# Patient Record
Sex: MALE | Race: AMERICAN INDIAN OR ALASKA NATIVE | NOT HISPANIC OR LATINO | URBAN - METROPOLITAN AREA
[De-identification: names, ages, dates, MRNs, and addresses within clinical notes are randomized per-mention and may not be internally consistent; named-entity substitution may affect disease eponyms.]

---

## 2017-02-11 ENCOUNTER — OUTPATIENT (OUTPATIENT)
Dept: OUTPATIENT SERVICES | Facility: HOSPITAL | Age: 47
LOS: 1 days | Discharge: HOME | End: 2017-02-11

## 2017-06-27 DIAGNOSIS — M25.519 PAIN IN UNSPECIFIED SHOULDER: ICD-10-CM

## 2018-03-10 ENCOUNTER — EMERGENCY (EMERGENCY)
Facility: HOSPITAL | Age: 48
LOS: 0 days | Discharge: HOME | End: 2018-03-10
Admitting: INTERNAL MEDICINE

## 2018-03-10 VITALS
HEART RATE: 99 BPM | DIASTOLIC BLOOD PRESSURE: 84 MMHG | OXYGEN SATURATION: 96 % | RESPIRATION RATE: 18 BRPM | SYSTOLIC BLOOD PRESSURE: 156 MMHG | TEMPERATURE: 97 F

## 2018-03-10 DIAGNOSIS — Z79.83 LONG TERM (CURRENT) USE OF BISPHOSPHONATES: ICD-10-CM

## 2018-03-10 DIAGNOSIS — M54.6 PAIN IN THORACIC SPINE: ICD-10-CM

## 2018-03-10 DIAGNOSIS — R05 COUGH: ICD-10-CM

## 2018-03-10 DIAGNOSIS — Z79.2 LONG TERM (CURRENT) USE OF ANTIBIOTICS: ICD-10-CM

## 2018-03-10 DIAGNOSIS — E11.9 TYPE 2 DIABETES MELLITUS WITHOUT COMPLICATIONS: ICD-10-CM

## 2018-03-10 DIAGNOSIS — J06.9 ACUTE UPPER RESPIRATORY INFECTION, UNSPECIFIED: ICD-10-CM

## 2018-03-10 RX ORDER — AZITHROMYCIN 500 MG/1
1 TABLET, FILM COATED ORAL
Qty: 6 | Refills: 0 | OUTPATIENT
Start: 2018-03-10 | End: 2018-03-14

## 2018-03-10 RX ORDER — IBUPROFEN 200 MG
600 TABLET ORAL ONCE
Qty: 0 | Refills: 0 | Status: COMPLETED | OUTPATIENT
Start: 2018-03-10 | End: 2018-03-10

## 2018-03-10 RX ORDER — DEXAMETHASONE 0.5 MG/5ML
10 ELIXIR ORAL ONCE
Qty: 0 | Refills: 0 | Status: COMPLETED | OUTPATIENT
Start: 2018-03-10 | End: 2018-03-10

## 2018-03-10 RX ADMIN — Medication 600 MILLIGRAM(S): at 23:15

## 2018-03-10 RX ADMIN — Medication 10 MILLIGRAM(S): at 23:15

## 2018-03-10 NOTE — ED ADULT NURSE NOTE - CHPI ED SYMPTOMS NEG
no change in level of consciousness/no chills/no weakness/no fever/no numbness/no blurred vision/no vomiting/no loss of consciousness/no nausea/no syncope

## 2018-03-10 NOTE — ED PROVIDER NOTE - OBJECTIVE STATEMENT
46 yo male hx of DM present c/o coughing x 1 month and started having right sided ear and throat pain since yesterday. pain to throat relieved with advil. coughing not improved with cough medicine. also reports right side red eye and discharge 2 weeks ago and sxs resolved a day later. coughing also causes left sided upper back pain.   denies fever/chill/HA/dizziness/congestion/chest pain/sob/abd pain/n/v/d/urinary sxs.

## 2018-03-12 ENCOUNTER — EMERGENCY (EMERGENCY)
Facility: HOSPITAL | Age: 48
LOS: 0 days | Discharge: HOME | End: 2018-03-13
Attending: EMERGENCY MEDICINE

## 2018-03-12 VITALS
OXYGEN SATURATION: 98 % | SYSTOLIC BLOOD PRESSURE: 160 MMHG | DIASTOLIC BLOOD PRESSURE: 85 MMHG | TEMPERATURE: 97 F | HEART RATE: 73 BPM | RESPIRATION RATE: 19 BRPM

## 2018-03-12 VITALS
RESPIRATION RATE: 18 BRPM | DIASTOLIC BLOOD PRESSURE: 77 MMHG | TEMPERATURE: 99 F | HEART RATE: 71 BPM | OXYGEN SATURATION: 100 % | SYSTOLIC BLOOD PRESSURE: 147 MMHG

## 2018-03-12 DIAGNOSIS — Z79.899 OTHER LONG TERM (CURRENT) DRUG THERAPY: ICD-10-CM

## 2018-03-12 DIAGNOSIS — E11.9 TYPE 2 DIABETES MELLITUS WITHOUT COMPLICATIONS: ICD-10-CM

## 2018-03-12 DIAGNOSIS — R10.9 UNSPECIFIED ABDOMINAL PAIN: ICD-10-CM

## 2018-03-12 DIAGNOSIS — R11.2 NAUSEA WITH VOMITING, UNSPECIFIED: ICD-10-CM

## 2018-03-12 DIAGNOSIS — Z79.2 LONG TERM (CURRENT) USE OF ANTIBIOTICS: ICD-10-CM

## 2018-03-13 LAB
ALBUMIN SERPL ELPH-MCNC: 3.8 G/DL — SIGNIFICANT CHANGE UP (ref 3–5.5)
ALP SERPL-CCNC: 63 U/L — SIGNIFICANT CHANGE UP (ref 30–115)
ALT FLD-CCNC: 18 U/L — SIGNIFICANT CHANGE UP (ref 0–41)
ANION GAP SERPL CALC-SCNC: 6 MMOL/L — LOW (ref 7–14)
APPEARANCE UR: CLEAR — SIGNIFICANT CHANGE UP
AST SERPL-CCNC: 15 U/L — SIGNIFICANT CHANGE UP (ref 0–41)
BASOPHILS # BLD AUTO: 0.06 K/UL — SIGNIFICANT CHANGE UP (ref 0–0.2)
BASOPHILS NFR BLD AUTO: 0.4 % — SIGNIFICANT CHANGE UP (ref 0–1)
BILIRUB SERPL-MCNC: 0.9 MG/DL — SIGNIFICANT CHANGE UP (ref 0.2–1.2)
BILIRUB UR-MCNC: NEGATIVE — SIGNIFICANT CHANGE UP
BUN SERPL-MCNC: 12 MG/DL — SIGNIFICANT CHANGE UP (ref 10–20)
CALCIUM SERPL-MCNC: 9 MG/DL — SIGNIFICANT CHANGE UP (ref 8.5–10.1)
CHLORIDE SERPL-SCNC: 99 MMOL/L — SIGNIFICANT CHANGE UP (ref 98–110)
CO2 SERPL-SCNC: 30 MMOL/L — SIGNIFICANT CHANGE UP (ref 17–32)
COLOR SPEC: YELLOW — SIGNIFICANT CHANGE UP
CREAT SERPL-MCNC: 1 MG/DL — SIGNIFICANT CHANGE UP (ref 0.7–1.5)
DIFF PNL FLD: NEGATIVE — SIGNIFICANT CHANGE UP
EOSINOPHIL # BLD AUTO: 0.04 K/UL — SIGNIFICANT CHANGE UP (ref 0–0.7)
EOSINOPHIL NFR BLD AUTO: 0.3 % — SIGNIFICANT CHANGE UP (ref 0–8)
EPI CELLS # UR: (no result) /HPF
GLUCOSE SERPL-MCNC: 257 MG/DL — HIGH (ref 70–110)
GLUCOSE UR QL: >=1000
HCT VFR BLD CALC: 45.1 % — SIGNIFICANT CHANGE UP (ref 42–52)
HGB BLD-MCNC: 16.8 G/DL — SIGNIFICANT CHANGE UP (ref 14–18)
IMM GRANULOCYTES NFR BLD AUTO: 0.8 % — HIGH (ref 0.1–0.3)
KETONES UR-MCNC: NEGATIVE — SIGNIFICANT CHANGE UP
LEUKOCYTE ESTERASE UR-ACNC: NEGATIVE — SIGNIFICANT CHANGE UP
LYMPHOCYTES # BLD AUTO: 23.1 % — SIGNIFICANT CHANGE UP (ref 20.5–51.1)
LYMPHOCYTES # BLD AUTO: 3.36 K/UL — SIGNIFICANT CHANGE UP (ref 1.2–3.4)
MCHC RBC-ENTMCNC: 29.5 PG — SIGNIFICANT CHANGE UP (ref 27–31)
MCHC RBC-ENTMCNC: 36.6 G/DL — SIGNIFICANT CHANGE UP (ref 32–37)
MCV RBC AUTO: 80.5 FL — SIGNIFICANT CHANGE UP (ref 80–94)
MONOCYTES # BLD AUTO: 0.79 K/UL — HIGH (ref 0.1–0.6)
MONOCYTES NFR BLD AUTO: 5.4 % — SIGNIFICANT CHANGE UP (ref 1.7–9.3)
NEUTROPHILS # BLD AUTO: 10.2 K/UL — HIGH (ref 1.4–6.5)
NEUTROPHILS NFR BLD AUTO: 70 % — SIGNIFICANT CHANGE UP (ref 42.2–75.2)
NITRITE UR-MCNC: NEGATIVE — SIGNIFICANT CHANGE UP
NRBC # BLD: 0 /100 WBCS — SIGNIFICANT CHANGE UP (ref 0–0)
PH UR: 6.5 — SIGNIFICANT CHANGE UP (ref 5–8)
PLAT MORPH BLD: NORMAL — SIGNIFICANT CHANGE UP
PLATELET # BLD AUTO: 273 K/UL — SIGNIFICANT CHANGE UP (ref 130–400)
POTASSIUM SERPL-MCNC: 4.4 MMOL/L — SIGNIFICANT CHANGE UP (ref 3.5–5)
POTASSIUM SERPL-SCNC: 4.4 MMOL/L — SIGNIFICANT CHANGE UP (ref 3.5–5)
PROT SERPL-MCNC: 6 G/DL — SIGNIFICANT CHANGE UP (ref 6–8)
PROT UR-MCNC: 100
RBC # BLD: 5.6 M/UL — SIGNIFICANT CHANGE UP (ref 4.7–6.1)
RBC # FLD: 13.2 % — SIGNIFICANT CHANGE UP (ref 11.5–14.5)
RBC BLD AUTO: NORMAL — SIGNIFICANT CHANGE UP
RBC CASTS # UR COMP ASSIST: SIGNIFICANT CHANGE UP /HPF
SODIUM SERPL-SCNC: 135 MMOL/L — SIGNIFICANT CHANGE UP (ref 135–146)
SP GR SPEC: >=1.03 — SIGNIFICANT CHANGE UP (ref 1.01–1.03)
UROBILINOGEN FLD QL: 0.2 — SIGNIFICANT CHANGE UP (ref 0.2–0.2)
WBC # BLD: 14.5 K/UL — HIGH (ref 4.8–10.8)
WBC # FLD AUTO: 14.5 K/UL — HIGH (ref 4.8–10.8)
WBC UR QL: SIGNIFICANT CHANGE UP /HPF

## 2018-03-13 RX ORDER — ONDANSETRON 8 MG/1
8 TABLET, FILM COATED ORAL ONCE
Qty: 0 | Refills: 0 | Status: COMPLETED | OUTPATIENT
Start: 2018-03-13 | End: 2018-03-13

## 2018-03-13 RX ORDER — SODIUM CHLORIDE 9 MG/ML
1000 INJECTION INTRAMUSCULAR; INTRAVENOUS; SUBCUTANEOUS ONCE
Qty: 0 | Refills: 0 | Status: COMPLETED | OUTPATIENT
Start: 2018-03-13 | End: 2018-03-13

## 2018-03-13 RX ORDER — KETOROLAC TROMETHAMINE 30 MG/ML
30 SYRINGE (ML) INJECTION ONCE
Qty: 0 | Refills: 0 | Status: DISCONTINUED | OUTPATIENT
Start: 2018-03-13 | End: 2018-03-13

## 2018-03-13 RX ADMIN — Medication 30 MILLIGRAM(S): at 02:40

## 2018-03-13 RX ADMIN — SODIUM CHLORIDE 2000 MILLILITER(S): 9 INJECTION INTRAMUSCULAR; INTRAVENOUS; SUBCUTANEOUS at 02:15

## 2018-03-13 RX ADMIN — ONDANSETRON 8 MILLIGRAM(S): 8 TABLET, FILM COATED ORAL at 02:15

## 2018-03-13 RX ADMIN — Medication 30 MILLIGRAM(S): at 02:15

## 2018-03-13 NOTE — ED PROVIDER NOTE - OBJECTIVE STATEMENT
48 yo M with history of DM II, renal colic, recent URI, her for assessment of L sided back/flank pain associated with single episode of vomiting. No fever, chills, hematuria or dysuria.

## 2018-03-13 NOTE — ED PROVIDER NOTE - PHYSICAL EXAMINATION
VITAL SIGNS: I have reviewed nursing notes and confirm.  CONSTITUTIONAL: Well-developed; well-nourished; in no acute distress.  SKIN: Skin exam is warm and dry, no acute rash.  HEAD: Normocephalic; atraumatic.  EYES: PERRL, EOM intact; conjunctiva and sclera clear.  ENT: No nasal discharge; airway clear.  NECK: Supple; non tender.  CARD: Regular rate and rhythm.  RESP: No wheezes, rales or rhonchi.  ABD: Normal bowel sounds; soft; non-distended; non-tender  EXT: Normal ROM. No clubbing, cyanosis or edema.  BACK: no CVA tenderness  NEURO: Alert, oriented. Grossly unremarkable. No focal deficits.  PSYCH: Cooperative, appropriate.

## 2018-03-13 NOTE — ED ADULT NURSE NOTE - OBJECTIVE STATEMENT
patient admitted to Ed with complaints of left flank pain. Patient reports no n/v.  Patient reports no difficulty urination.  Patient denies chest pain and no SOB noted.

## 2018-03-13 NOTE — ED PROVIDER NOTE - NS ED ROS FT
Constitutional: no fever, chills, no recent weight loss, change in appetite or malaise  Cardiac: No chest pain, SOB or edema.  Respiratory: see HPI, recemt cough, congestion  GI: see HPI  : see HPI  MS: see HPI +flank/back pain, no loss of ROM, no weakness  Neuro: No headache or weakness. No LOC.  Except as documented in the HPI, all other systems are negative.

## 2018-03-13 NOTE — ED PROVIDER NOTE - PROGRESS NOTE DETAILS
CT scan, UA and labs unremarkable -- given recent viral URI, pain may be related to viral illness OR could be 2/2 recent coughing. No signs of PNA, -- will dc with close follow up, continued hydration, tylenol/motrin and return precuations

## 2018-03-13 NOTE — ED PROVIDER NOTE - MEDICAL DECISION MAKING DETAILS
Patient with history of renal colic, recent viral URI, here for assessment of L flank pain, nausea -- no dysuria or hematuria. Will check labs, UA, CT for renal colic, give analgesia, fluids and reassess.     Currently looks well, non toxic, afebrile, no distress. Not suggestive of pyelo or pneumonia.

## 2018-09-14 ENCOUNTER — INPATIENT (INPATIENT)
Facility: HOSPITAL | Age: 48
LOS: 1 days | Discharge: HOME | End: 2018-09-16
Attending: INTERNAL MEDICINE | Admitting: INTERNAL MEDICINE

## 2018-09-14 VITALS
HEART RATE: 80 BPM | DIASTOLIC BLOOD PRESSURE: 91 MMHG | RESPIRATION RATE: 18 BRPM | SYSTOLIC BLOOD PRESSURE: 167 MMHG | OXYGEN SATURATION: 100 %

## 2018-09-14 PROBLEM — E11.9 TYPE 2 DIABETES MELLITUS WITHOUT COMPLICATIONS: Chronic | Status: ACTIVE | Noted: 2018-03-10

## 2018-09-14 LAB
ALBUMIN SERPL ELPH-MCNC: 3.9 G/DL — SIGNIFICANT CHANGE UP (ref 3.5–5.2)
ALBUMIN SERPL ELPH-MCNC: 4.5 G/DL — SIGNIFICANT CHANGE UP (ref 3.5–5.2)
ALP SERPL-CCNC: 70 U/L — SIGNIFICANT CHANGE UP (ref 30–115)
ALP SERPL-CCNC: 86 U/L — SIGNIFICANT CHANGE UP (ref 30–115)
ALT FLD-CCNC: 20 U/L — SIGNIFICANT CHANGE UP (ref 0–41)
ALT FLD-CCNC: 24 U/L — SIGNIFICANT CHANGE UP (ref 0–41)
ANION GAP SERPL CALC-SCNC: 16 MMOL/L — HIGH (ref 7–14)
ANION GAP SERPL CALC-SCNC: 23 MMOL/L — HIGH (ref 7–14)
APTT BLD: 24.4 SEC — LOW (ref 27–39.2)
AST SERPL-CCNC: 18 U/L — SIGNIFICANT CHANGE UP (ref 0–41)
AST SERPL-CCNC: 35 U/L — SIGNIFICANT CHANGE UP (ref 0–41)
BILIRUB SERPL-MCNC: 0.6 MG/DL — SIGNIFICANT CHANGE UP (ref 0.2–1.2)
BILIRUB SERPL-MCNC: 0.7 MG/DL — SIGNIFICANT CHANGE UP (ref 0.2–1.2)
BUN SERPL-MCNC: 10 MG/DL — SIGNIFICANT CHANGE UP (ref 10–20)
BUN SERPL-MCNC: 11 MG/DL — SIGNIFICANT CHANGE UP (ref 10–20)
CALCIUM SERPL-MCNC: 8.9 MG/DL — SIGNIFICANT CHANGE UP (ref 8.5–10.1)
CALCIUM SERPL-MCNC: 9.8 MG/DL — SIGNIFICANT CHANGE UP (ref 8.5–10.1)
CHLORIDE SERPL-SCNC: 96 MMOL/L — LOW (ref 98–110)
CHLORIDE SERPL-SCNC: 98 MMOL/L — SIGNIFICANT CHANGE UP (ref 98–110)
CO2 SERPL-SCNC: 19 MMOL/L — SIGNIFICANT CHANGE UP (ref 17–32)
CO2 SERPL-SCNC: 24 MMOL/L — SIGNIFICANT CHANGE UP (ref 17–32)
CREAT SERPL-MCNC: 0.7 MG/DL — SIGNIFICANT CHANGE UP (ref 0.7–1.5)
CREAT SERPL-MCNC: 1 MG/DL — SIGNIFICANT CHANGE UP (ref 0.7–1.5)
GLUCOSE BLDC GLUCOMTR-MCNC: 187 MG/DL — HIGH (ref 70–99)
GLUCOSE BLDC GLUCOMTR-MCNC: 251 MG/DL — HIGH (ref 70–99)
GLUCOSE BLDC GLUCOMTR-MCNC: 281 MG/DL — HIGH (ref 70–99)
GLUCOSE SERPL-MCNC: 168 MG/DL — HIGH (ref 70–99)
GLUCOSE SERPL-MCNC: 226 MG/DL — HIGH (ref 70–99)
HCT VFR BLD CALC: 42.5 % — SIGNIFICANT CHANGE UP (ref 42–52)
HGB BLD-MCNC: 15.4 G/DL — SIGNIFICANT CHANGE UP (ref 14–18)
INR BLD: 0.99 RATIO — SIGNIFICANT CHANGE UP (ref 0.65–1.3)
MCHC RBC-ENTMCNC: 28.9 PG — SIGNIFICANT CHANGE UP (ref 27–31)
MCHC RBC-ENTMCNC: 36.2 G/DL — SIGNIFICANT CHANGE UP (ref 32–37)
MCV RBC AUTO: 79.9 FL — LOW (ref 80–94)
NRBC # BLD: 0 /100 WBCS — SIGNIFICANT CHANGE UP (ref 0–0)
PLATELET # BLD AUTO: 255 K/UL — SIGNIFICANT CHANGE UP (ref 130–400)
POTASSIUM SERPL-MCNC: 4.3 MMOL/L — SIGNIFICANT CHANGE UP (ref 3.5–5)
POTASSIUM SERPL-MCNC: 4.3 MMOL/L — SIGNIFICANT CHANGE UP (ref 3.5–5)
POTASSIUM SERPL-SCNC: 4.3 MMOL/L — SIGNIFICANT CHANGE UP (ref 3.5–5)
POTASSIUM SERPL-SCNC: 4.3 MMOL/L — SIGNIFICANT CHANGE UP (ref 3.5–5)
PROT SERPL-MCNC: 6.2 G/DL — SIGNIFICANT CHANGE UP (ref 6–8)
PROT SERPL-MCNC: 7.6 G/DL — SIGNIFICANT CHANGE UP (ref 6–8)
PROTHROM AB SERPL-ACNC: 10.7 SEC — SIGNIFICANT CHANGE UP (ref 9.95–12.87)
RBC # BLD: 5.32 M/UL — SIGNIFICANT CHANGE UP (ref 4.7–6.1)
RBC # FLD: 13.2 % — SIGNIFICANT CHANGE UP (ref 11.5–14.5)
SODIUM SERPL-SCNC: 138 MMOL/L — SIGNIFICANT CHANGE UP (ref 135–146)
SODIUM SERPL-SCNC: 138 MMOL/L — SIGNIFICANT CHANGE UP (ref 135–146)
TROPONIN T SERPL-MCNC: 0.13 NG/ML — CRITICAL HIGH
TROPONIN T SERPL-MCNC: <0.01 NG/ML — SIGNIFICANT CHANGE UP
WBC # BLD: 12.65 K/UL — HIGH (ref 4.8–10.8)
WBC # FLD AUTO: 12.65 K/UL — HIGH (ref 4.8–10.8)

## 2018-09-14 RX ORDER — LISINOPRIL 2.5 MG/1
5 TABLET ORAL DAILY
Qty: 0 | Refills: 0 | Status: DISCONTINUED | OUTPATIENT
Start: 2018-09-14 | End: 2018-09-16

## 2018-09-14 RX ORDER — INSULIN GLARGINE 100 [IU]/ML
24 INJECTION, SOLUTION SUBCUTANEOUS AT BEDTIME
Qty: 0 | Refills: 0 | Status: DISCONTINUED | OUTPATIENT
Start: 2018-09-14 | End: 2018-09-16

## 2018-09-14 RX ORDER — TICAGRELOR 90 MG/1
1 TABLET ORAL
Qty: 60 | Refills: 0 | OUTPATIENT
Start: 2018-09-14 | End: 2018-10-13

## 2018-09-14 RX ORDER — INSULIN LISPRO 100/ML
VIAL (ML) SUBCUTANEOUS
Qty: 0 | Refills: 0 | Status: DISCONTINUED | OUTPATIENT
Start: 2018-09-14 | End: 2018-09-16

## 2018-09-14 RX ORDER — DEXTROSE 50 % IN WATER 50 %
25 SYRINGE (ML) INTRAVENOUS ONCE
Qty: 0 | Refills: 0 | Status: DISCONTINUED | OUTPATIENT
Start: 2018-09-14 | End: 2018-09-16

## 2018-09-14 RX ORDER — INSULIN LISPRO 100/ML
7 VIAL (ML) SUBCUTANEOUS
Qty: 0 | Refills: 0 | Status: DISCONTINUED | OUTPATIENT
Start: 2018-09-14 | End: 2018-09-16

## 2018-09-14 RX ORDER — ATORVASTATIN CALCIUM 80 MG/1
40 TABLET, FILM COATED ORAL AT BEDTIME
Qty: 0 | Refills: 0 | Status: DISCONTINUED | OUTPATIENT
Start: 2018-09-14 | End: 2018-09-14

## 2018-09-14 RX ORDER — TICAGRELOR 90 MG/1
90 TABLET ORAL
Qty: 0 | Refills: 0 | Status: DISCONTINUED | OUTPATIENT
Start: 2018-09-14 | End: 2018-09-14

## 2018-09-14 RX ORDER — NITROGLYCERIN 6.5 MG
0.4 CAPSULE, EXTENDED RELEASE ORAL ONCE
Qty: 0 | Refills: 0 | Status: COMPLETED | OUTPATIENT
Start: 2018-09-14 | End: 2018-09-14

## 2018-09-14 RX ORDER — PRASUGREL 5 MG/1
10 TABLET, FILM COATED ORAL DAILY
Qty: 0 | Refills: 0 | Status: DISCONTINUED | OUTPATIENT
Start: 2018-09-15 | End: 2018-09-16

## 2018-09-14 RX ORDER — GLUCAGON INJECTION, SOLUTION 0.5 MG/.1ML
1 INJECTION, SOLUTION SUBCUTANEOUS ONCE
Qty: 0 | Refills: 0 | Status: DISCONTINUED | OUTPATIENT
Start: 2018-09-14 | End: 2018-09-16

## 2018-09-14 RX ORDER — METFORMIN HYDROCHLORIDE 850 MG/1
1 TABLET ORAL
Qty: 0 | Refills: 0 | COMMUNITY

## 2018-09-14 RX ORDER — CHLORHEXIDINE GLUCONATE 213 G/1000ML
1 SOLUTION TOPICAL
Qty: 0 | Refills: 0 | Status: DISCONTINUED | OUTPATIENT
Start: 2018-09-14 | End: 2018-09-16

## 2018-09-14 RX ORDER — DEXTROSE 50 % IN WATER 50 %
12.5 SYRINGE (ML) INTRAVENOUS ONCE
Qty: 0 | Refills: 0 | Status: DISCONTINUED | OUTPATIENT
Start: 2018-09-14 | End: 2018-09-16

## 2018-09-14 RX ORDER — ATORVASTATIN CALCIUM 80 MG/1
80 TABLET, FILM COATED ORAL AT BEDTIME
Qty: 0 | Refills: 0 | Status: DISCONTINUED | OUTPATIENT
Start: 2018-09-14 | End: 2018-09-16

## 2018-09-14 RX ORDER — ASPIRIN/CALCIUM CARB/MAGNESIUM 324 MG
325 TABLET ORAL ONCE
Qty: 0 | Refills: 0 | Status: COMPLETED | OUTPATIENT
Start: 2018-09-14 | End: 2018-09-14

## 2018-09-14 RX ORDER — ASPIRIN/CALCIUM CARB/MAGNESIUM 324 MG
81 TABLET ORAL DAILY
Qty: 0 | Refills: 0 | Status: DISCONTINUED | OUTPATIENT
Start: 2018-09-14 | End: 2018-09-16

## 2018-09-14 RX ORDER — DEXTROSE 50 % IN WATER 50 %
15 SYRINGE (ML) INTRAVENOUS ONCE
Qty: 0 | Refills: 0 | Status: DISCONTINUED | OUTPATIENT
Start: 2018-09-14 | End: 2018-09-16

## 2018-09-14 RX ORDER — PRASUGREL 5 MG/1
30 TABLET, FILM COATED ORAL ONCE
Qty: 0 | Refills: 0 | Status: COMPLETED | OUTPATIENT
Start: 2018-09-14 | End: 2018-09-14

## 2018-09-14 RX ORDER — SODIUM CHLORIDE 9 MG/ML
1000 INJECTION, SOLUTION INTRAVENOUS
Qty: 0 | Refills: 0 | Status: DISCONTINUED | OUTPATIENT
Start: 2018-09-14 | End: 2018-09-16

## 2018-09-14 RX ORDER — PRASUGREL 5 MG/1
30 TABLET, FILM COATED ORAL ONCE
Qty: 0 | Refills: 0 | Status: DISCONTINUED | OUTPATIENT
Start: 2018-09-14 | End: 2018-09-14

## 2018-09-14 RX ORDER — ENOXAPARIN SODIUM 100 MG/ML
40 INJECTION SUBCUTANEOUS DAILY
Qty: 0 | Refills: 0 | Status: DISCONTINUED | OUTPATIENT
Start: 2018-09-14 | End: 2018-09-16

## 2018-09-14 RX ORDER — METOPROLOL TARTRATE 50 MG
25 TABLET ORAL
Qty: 0 | Refills: 0 | Status: DISCONTINUED | OUTPATIENT
Start: 2018-09-14 | End: 2018-09-16

## 2018-09-14 RX ORDER — SODIUM CHLORIDE 9 MG/ML
450 INJECTION INTRAMUSCULAR; INTRAVENOUS; SUBCUTANEOUS
Qty: 0 | Refills: 0 | Status: DISCONTINUED | OUTPATIENT
Start: 2018-09-14 | End: 2018-09-16

## 2018-09-14 RX ADMIN — Medication 325 MILLIGRAM(S): at 07:41

## 2018-09-14 RX ADMIN — Medication 0.4 MILLIGRAM(S): at 07:41

## 2018-09-14 RX ADMIN — Medication 3: at 12:41

## 2018-09-14 RX ADMIN — Medication 25 MILLIGRAM(S): at 17:40

## 2018-09-14 RX ADMIN — INSULIN GLARGINE 24 UNIT(S): 100 INJECTION, SOLUTION SUBCUTANEOUS at 21:43

## 2018-09-14 RX ADMIN — Medication 7 UNIT(S): at 17:15

## 2018-09-14 RX ADMIN — Medication 3: at 17:15

## 2018-09-14 RX ADMIN — Medication 7 UNIT(S): at 12:41

## 2018-09-14 RX ADMIN — PRASUGREL 30 MILLIGRAM(S): 5 TABLET, FILM COATED ORAL at 18:42

## 2018-09-14 RX ADMIN — ENOXAPARIN SODIUM 40 MILLIGRAM(S): 100 INJECTION SUBCUTANEOUS at 18:42

## 2018-09-14 RX ADMIN — ATORVASTATIN CALCIUM 80 MILLIGRAM(S): 80 TABLET, FILM COATED ORAL at 21:43

## 2018-09-14 RX ADMIN — Medication 81 MILLIGRAM(S): at 12:33

## 2018-09-14 RX ADMIN — LISINOPRIL 5 MILLIGRAM(S): 2.5 TABLET ORAL at 12:33

## 2018-09-14 NOTE — ED PEDIATRIC NURSE NOTE - NSIMPLEMENTINTERV_GEN_ALL_ED
Implemented All Universal Safety Interventions:  [place] to call system. Call bell, personal items and telephone within reach. Instruct patient to call for assistance. Room bathroom lighting operational. Non-slip footwear when patient is off stretcher. Physically safe environment: no spills, clutter or unnecessary equipment. Stretcher in lowest position, wheels locked, appropriate side rails in place.

## 2018-09-14 NOTE — ED PROVIDER NOTE - NS ED ROS FT
cardiac +chest pain, no edema  respiratory: +sob  unable to obtain full ROS 2/2 acuity and taken to cath lab

## 2018-09-14 NOTE — H&P ADULT - NSHPPHYSICALEXAM_GEN_ALL_CORE
GENERAL: NAD  HEENT: NC/AT; PERRL, EOMI.  NECK: Supple.  CARD: RRR; S1; S2; no MRG.  RESP: CTA BL with no rales or rhonchi.  ABD: Soft, NT/ND; BS+ in all 4 quadrants.  EXT: UE and LE muscle tone intact with 5/5 strength and no edema.  NEURO: Ox3.  SKIN: Intact.

## 2018-09-14 NOTE — ED PROVIDER NOTE - OBJECTIVE STATEMENT
49yo m pmhx diabetes presents CC crushing chest pain since this morning, about 10 minute after arrival pain resolved. pt states he had similar episode yesterday. associated w sob. no cardiologist or prior cardiac events. ST elevations anterior leads, STEMI code called, cardiology at bedside, taken to cath lab

## 2018-09-14 NOTE — H&P ADULT - NSHPLABSRESULTS_GEN_ALL_CORE
09-14    138  |  96<L>  |  10  ----------------------------<  168<H>  4.3   |  19  |  1.0    Ca    9.8      14 Sep 2018 07:29    TPro  7.6  /  Alb  4.5  /  TBili  0.7  /  DBili  x   /  AST  35  /  ALT  24  /  AlkPhos  86  09-14    PT/INR - ( 14 Sep 2018 07:29 )   PT: 10.70 sec;   INR: 0.99 ratio    PTT - ( 14 Sep 2018 07:29 )  PTT:24.4 sec    Troponin <0.01

## 2018-09-14 NOTE — H&P ADULT - ATTENDING COMMENTS
Notified by Hospitalist office of pts admission last night  Pt belongs to our practice  Chart reviewed  Will transfer to our service  will see pt later today  Thank you

## 2018-09-14 NOTE — ED PROVIDER NOTE - CRITICAL CARE PROVIDED
documentation/consult w/ pt's family directly relating to pts condition/interpretation of diagnostic studies/consultation with other physicians/additional history taking/direct patient care (not related to procedure)

## 2018-09-14 NOTE — H&P ADULT - HISTORY OF PRESENT ILLNESS
The patient is a 48 year old male who presented to [facility] ED with cc/o chest pain x2 days. He has a PMHx significant for T2DM (Metformin XR, Pioglitazone, and Insulin). The patient believed the initial chest pain was due to heart burn, which lasted a few hours and spontaneously resolved. However, the pain returned around 1:00 AM on 09/14/2018, and significantly worsened in intensity at around 6:00 AM, prompting the patient to come to the hospital. EKG showed ST elevations in V2-V4. He was subsequently taken to cath; s/p PCI to LAD and brought to CCU for observation and continued care.

## 2018-09-14 NOTE — CONSULT NOTE ADULT - SUBJECTIVE AND OBJECTIVE BOX
Chief complaint:  Chest Pain    HPI:  49 yo M h/o DM p/w substernal chest pain that started last night decided to come to ER this AM was found to have AWMI on EKG pt was taken to cath lab for emergent PCI    ROS:  Constitutional: No fever, chill, sweats  Eye: No recent visual problem  ENMT: No ear pain, nasal congestion, throat pain  Respiratoty: No SOB, cough  Cardiovascular: + chest pain, palpitaion, syncope  Gastrointestinal: No nausea, vomitting, diarhea  Genitourinary: No dysuria, hematuria  Heam/Lymp: No brusing tendency, no swollen glands  Endocrine: Negative for excessive hunger, thirst  Musculoskeletal: No neck pain, back pain, joint pain  Intergumentory: No rash, skin lesions  Neurologic: alert and oriented    PAST MEDICAL & SURGICAL HISTORY  Diabetes mellitus  No significant past surgical history    FAMILY HISTORY:  FAMILY HISTORY:  No pertinent family history in first degree relatives    SOCIAL HISTORY:  []smoker  []Alcohol  []Drug    ALLERGIES:  Allergy Status Unknown    MEDICATIONS:  MEDICATIONS  (STANDING):  aspirin  chewable 81 milliGRAM(s) Oral daily  atorvastatin 40 milliGRAM(s) Oral at bedtime  lisinopril 5 milliGRAM(s) Oral daily  metoprolol tartrate 25 milliGRAM(s) Oral two times a day  sodium chloride 0.9%. 450 milliLiter(s) (75 mL/Hr) IV Continuous <Continuous>  ticagrelor 90 milliGRAM(s) Oral two times a day    MEDICATIONS  (PRN):      HOME MEDICATIONS:  Home Medications:      VITALS:   T(F): 98 (09-14 @ 07:37), Max: 98 (09-14 @ 07:37)  HR: 138 (09-14 @ 07:37) (80 - 138)  BP: 138/78 (09-14 @ 07:37) (138/78 - 167/91)  BP(mean): --  RR: 20 (09-14 @ 07:37) (16 - 20)  SpO2: 95% (09-14 @ 07:37) (95% - 100%)    I&O's Summary      PHYSICAL EXAM:  GEN: Alert and oriented X 3, Well nourished, No acute distress  NECK: Supple, non tender, NO JVD, No carotid bruit,   LUNGS: Clear to auscultation bilaterally, non labored respiration  CARDIOVASCULAR: S1/S2 present, RRR , no murmus or rubs, + PP bilaterally  ABD: Soft, non-tender, non-distended,   EXT: No Lower extreimity edema, no tenderness  NEURO: Non focal  SKIN: Intact    LABS:    09-14    138  |  96<L>  |  10  ----------------------------<  168<H>  4.3   |  19  |  1.0    Ca    9.8      14 Sep 2018 07:29    TPro  7.6  /  Alb  4.5  /  TBili  0.7  /  DBili  x   /  AST  35  /  ALT  24  /  AlkPhos  86  09-14    PT/INR - ( 14 Sep 2018 07:29 )   PT: 10.70 sec;   INR: 0.99 ratio         PTT - ( 14 Sep 2018 07:29 )  PTT:24.4 sec  Troponin T, Serum: <0.01 ng/mL (09-14-18 @ 07:29)    CARDIAC MARKERS ( 14 Sep 2018 07:29 )  x     / <0.01 ng/mL / x     / x     / x          RADIOLOGY:  -CXR:  -TTE:  -CCTA:  -STRESS TEST:  -CATHETERIZATION:    ECG: ST lety in anterolateral leads Chief complaint:  Chest Pain    HPI:  47 yo M h/o DM p/w substernal chest pain that started last night , pressure like, no radiation, associated with sob and palpitations  decided to come to ER this AM was found to have AWMI on EKG pt was taken to cath lab for emergent PCI  Sanjana, good et, non smoker, no fhx of cad      ROS:  Constitutional: No fever, chill, sweats  Eye: No recent visual problem  ENMT: No ear pain, nasal congestion, throat pain  Respiratory No SOB, cough  Cardiovascular: + chest pain, palpitaion, syncope  Gastrointestinal: No nausea, vomitting, diarhea  Genitourinary: No dysuria, hematuria  Heam/Lymp: No brusing tendency, no swollen glands  Endocrine: Negative for excessive hunger, thirst  Musculoskeletal: No neck pain, back pain, joint pain  Intergumentory: No rash, skin lesions  Neurologic: alert and oriented    PAST MEDICAL & SURGICAL HISTORY  Diabetes mellitus  No significant past surgical history    FAMILY HISTORY:  FAMILY HISTORY:  No pertinent family history of cad in first degree relatives    SOCIAL HISTORY:  [x]non smoker  [x] no Alcohol  []Drug    ALLERGIES:  Allergy Status Unknown    MEDICATIONS:  MEDICATIONS  (STANDING):  aspirin  chewable 81 milliGRAM(s) Oral daily  atorvastatin 40 milliGRAM(s) Oral at bedtime  lisinopril 5 milliGRAM(s) Oral daily  metoprolol tartrate 25 milliGRAM(s) Oral two times a day  sodium chloride 0.9%. 450 milliLiter(s) (75 mL/Hr) IV Continuous <Continuous>  ticagrelor 90 milliGRAM(s) Oral two times a day    MEDICATIONS  (PRN):      HOME MEDICATIONS:  Home Medications:      VITALS:   T(F): 98 (09-14 @ 07:37), Max: 98 (09-14 @ 07:37)  HR: 138 (09-14 @ 07:37) (80 - 138)  BP: 138/78 (09-14 @ 07:37) (138/78 - 167/91)  BP(mean): --  RR: 20 (09-14 @ 07:37) (16 - 20)  SpO2: 95% (09-14 @ 07:37) (95% - 100%)    I&O's Summary      PHYSICAL EXAM:  GEN: Alert and oriented X 3, Well nourished, No acute distress  NECK: Supple, non tender, NO JVD, No carotid bruit,   LUNGS: Clear to auscultation bilaterally, non labored respiration  CARDIOVASCULAR: S1/S2 present, RRR , no murmus or rubs, + PP bilaterally  ABD: Soft, non-tender, non-distended,   MS: No Lower extreimity edema, no tenderness  NEURO: Non focal  SKIN: Intact    LABS:    09-14    138  |  96<L>  |  10  ----------------------------<  168<H>  4.3   |  19  |  1.0    Ca    9.8      14 Sep 2018 07:29    TPro  7.6  /  Alb  4.5  /  TBili  0.7  /  DBili  x   /  AST  35  /  ALT  24  /  AlkPhos  86  09-14    PT/INR - ( 14 Sep 2018 07:29 )   PT: 10.70 sec;   INR: 0.99 ratio         PTT - ( 14 Sep 2018 07:29 )  PTT:24.4 sec  Troponin T, Serum: <0.01 ng/mL (09-14-18 @ 07:29)    CARDIAC MARKERS ( 14 Sep 2018 07:29 )  x     / <0.01 ng/mL / x     / x     / x          RADIOLOGY:  -CXR:  -TTE:  -CCTA:  -STRESS TEST:  -CATHETERIZATION:    ECG: ST lety in anterolateral leads

## 2018-09-14 NOTE — ED PROVIDER NOTE - MEDICAL DECISION MAKING DETAILS
47 y/o M pmh DM, p/w intermittent cp since last night, severe this AM + SOB, EKG w/ precordial TYLER.  Pt looks uncomfortable, CTAB; RRR; pulses 2+ x 4 extrem; no leg swelling.  No prior Similar pain, no prior cath.  STEMI code called.  Pt seen by cardiology, admitted for emergent CATH for STEMI.  Pain improved in ED.  Pt remained hemodynamically stable.

## 2018-09-14 NOTE — CONSULT NOTE ADULT - ASSESSMENT
AWMI s/p mLAD PCI with DAWSON  - DAPT  - admit to CCU  - BB/ACEI  - 2d echo  - Hba1c, lipid profile  - lipitor 80 mg QHS AWMI s/p mLAD PCI with DAWSON  - DAPT  - admit to CCU  - BB/ACEI  - 2d echo  - Hba1c, lipid profile  - lipitor 80 mg QHS  - aggressive risk modif

## 2018-09-14 NOTE — ED PROVIDER NOTE - PHYSICAL EXAMINATION
CONSTITUTIONAL: Well-developed; well-nourished; holding chest  SKIN: warm, dry  HEAD: Normocephalic; atraumatic.  EYES: PERRL, EOMI, no conjunctival erythema  ENT: No nasal discharge; airway clear, mucous membranes moist  NECK: Supple; non tender.  CARD: +S1, S2 no murmurs, gallops, or rubs. Regular rate and rhythm. radial 2+  RESP: No wheezes, rales or rhonchi. CTABL  ABD: soft ntnd  EXT: moves all extremities,  No clubbing, cyanosis or edema.   NEURO: Alert, oriented, no focal deficits  PSYCH: Cooperative, appropriate.

## 2018-09-14 NOTE — ED ADULT NURSE NOTE - NSIMPLEMENTINTERV_GEN_ALL_ED
Implemented All Fall with Harm Risk Interventions:  [place] to call system. Call bell, personal items and telephone within reach. Instruct patient to call for assistance. Room bathroom lighting operational. Non-slip footwear when patient is off stretcher. Physically safe environment: no spills, clutter or unnecessary equipment. Stretcher in lowest position, wheels locked, appropriate side rails in place. Provide visual cue, wrist band, yellow gown, etc. Monitor gait and stability. Monitor for mental status changes and reorient to person, place, and time. Review medications for side effects contributing to fall risk. Reinforce activity limits and safety measures with patient and family. Provide visual clues: red socks.

## 2018-09-14 NOTE — H&P ADULT - FAMILY HISTORY
Mother  Still living? Unknown  Family history of type 2 diabetes mellitus, Age at diagnosis: Age Unknown     Sibling  Still living? Unknown  Family history of type 2 diabetes mellitus, Age at diagnosis: Age Unknown

## 2018-09-14 NOTE — H&P ADULT - ASSESSMENT
The patient is a 48 year old male who presented to [facility] ED with cc/o chest pain x2 days. He has a PMHx significant for T2DM (Metformin XR, Pioglitazone, and Insulin). The patient believed the initial chest pain was due to heart burn, which lasted a few hours and spontaneously resolved. However, the pain returned around 1:00 AM on 09/14/2018, and significantly worsened in intensity at around 6:00 AM, prompting the patient to come to the hospital. EKG showed ST elevations in V2-V4. He was subsequently taken to cath; s/p PCI to LAD and brought to CCU for observation and continued care.    1. STEMIA s/p PCI to LAD  - Admitted to CCU  - ASA 81  - Prasugrel 30mg tonight; then 10mg daily  - Atorvastatin 80mg PO QD  - Lisinopril 5mg PO QD  - Metoprolol Tartrate 25mg PO BID    2. Type 2 diabetes mellitus  - Will start Basal/Bolus insulin with SSI  - Will adjust dose as needed for tight control    DIET: DASH/TLC & Carb consisten  DVT Ppx: Lovenox 40mg SubQ  Activity: Increase as tolerated    CHG washes daily    ~FULL CODE~

## 2018-09-14 NOTE — PROGRESS NOTE ADULT - SUBJECTIVE AND OBJECTIVE BOX
Cardiology Follow up    MANN ZAFAR   48yMale  PAST MEDICAL & SURGICAL HISTORY:  Diabetes mellitus  No significant past surgical history    Allergies    Allergy Status Unknown    Intolerances        Patient without complaints.   Denies CP, SOB, palpitations, or dizziness    Vital Signs Last 24 Hrs  T(C): 36.7 (14 Sep 2018 07:37), Max: 36.7 (14 Sep 2018 07:37)  T(F): 98 (14 Sep 2018 07:37), Max: 98 (14 Sep 2018 07:37)  HR: 76 (14 Sep 2018 10:26) (70 - 138)  BP: 135/84 (14 Sep 2018 10:26) (119/80 - 167/91)  BP(mean): 98 (14 Sep 2018 10:26) (93 - 103)  RR: 13 (14 Sep 2018 10:26) (12 - 20)  SpO2: 98% (14 Sep 2018 10:26) (95% - 100%)Allergies    REVIEW OF SYSTEMS:    CONSTITUTIONAL: No weakness, fevers or chills  EYES/ENT: No visual changes;  No vertigo or throat pain   NECK: No pain or stiffness  RESPIRATORY: No cough, wheezing, hemoptysis; No shortness of breath  CARDIOVASCULAR: No chest pain or palpitations  GASTROINTESTINAL: No abdominal or epigastric pain. No nausea, vomiting, or hematemesis; No diarrhea or constipation. No melena or hematochezia.  GENITOURINARY: No dysuria, frequency or hematuria  NEUROLOGICAL: No numbness or weakness  SKIN: No itching, rashes        NAD, appears well  S1S2, no murmurs, no JVD  CTA B/L, no wheeze, no rales  SNT +BS  Ext:    Right Groin:  NO hematoma or bleeding    NO bruit,  dressing C/D/I + pulses  	      Pulses:  +Rad/ +PTs /+DPs/ same as baseline  A&Ox 3    EKG       P                                                                                                                2D ECHO  P    LABS    09-14    138  |  96<L>  |  10  ----------------------------<  168<H>  4.3   |  19  |  1.0    Ca    9.8      14 Sep 2018 07:29    TPro  7.6  /  Alb  4.5  /  TBili  0.7  /  DBili  x   /  AST  35  /  ALT  24  /  AlkPhos  86  09-14    CARDIAC MARKERS ( 14 Sep 2018 07:29 )  x     / <0.01 ng/mL / x     / x     / x          LIVER FUNCTIONS - ( 14 Sep 2018 07:29 )  Alb: 4.5 g/dL / Pro: 7.6 g/dL / ALK PHOS: 86 U/L / ALT: 24 U/L / AST: 35 U/L / GGT: x             A/P:  I discussed the case with Interventional Cardiologist Dr. Cervantes & recommends the following:    STEMI/S/P PCI mLAD  	         Continue DAPT(asa 81mg daily, effient 10 mg daily),  B-Blocker, Statin Therapy                   add ACE/ARB as BP allows                   brilinta not covered, change to effient per DR. Cervantes                   give effient 30 mg po x 1 tonight at 2200, then effient 10mg daily starting tomorrow                     RX for brilinta canceled with pharmacy                   Pt given instructions on importance of taking antiplatelet medication or risk acute stent thrombosis/death                   Post cath instructions, access site care and activity restrictions reviewed with patient                     Discussed with patient to return to hospital if experience chest pain, shortness breath, dizziness and site bleeding                   Aggressive risk factor modification,  diet counseling, smoking cessation discussed with patient                        Follow up with Cardiology Dr. Cervantes in am

## 2018-09-15 ENCOUNTER — TRANSCRIPTION ENCOUNTER (OUTPATIENT)
Age: 48
End: 2018-09-15

## 2018-09-15 LAB
ALBUMIN SERPL ELPH-MCNC: 3.8 G/DL — SIGNIFICANT CHANGE UP (ref 3.5–5.2)
ALP SERPL-CCNC: 65 U/L — SIGNIFICANT CHANGE UP (ref 30–115)
ALT FLD-CCNC: 18 U/L — SIGNIFICANT CHANGE UP (ref 0–41)
ANION GAP SERPL CALC-SCNC: 13 MMOL/L — SIGNIFICANT CHANGE UP (ref 7–14)
AST SERPL-CCNC: 14 U/L — SIGNIFICANT CHANGE UP (ref 0–41)
BASOPHILS # BLD AUTO: 0.05 K/UL — SIGNIFICANT CHANGE UP (ref 0–0.2)
BASOPHILS NFR BLD AUTO: 0.4 % — SIGNIFICANT CHANGE UP (ref 0–1)
BILIRUB SERPL-MCNC: 0.6 MG/DL — SIGNIFICANT CHANGE UP (ref 0.2–1.2)
BUN SERPL-MCNC: 9 MG/DL — LOW (ref 10–20)
CALCIUM SERPL-MCNC: 8.9 MG/DL — SIGNIFICANT CHANGE UP (ref 8.5–10.1)
CHLORIDE SERPL-SCNC: 102 MMOL/L — SIGNIFICANT CHANGE UP (ref 98–110)
CK MB CFR SERPL CALC: 2.9 NG/ML — SIGNIFICANT CHANGE UP (ref 0.6–6.3)
CK SERPL-CCNC: 56 U/L — SIGNIFICANT CHANGE UP (ref 0–225)
CO2 SERPL-SCNC: 23 MMOL/L — SIGNIFICANT CHANGE UP (ref 17–32)
CREAT SERPL-MCNC: 0.7 MG/DL — SIGNIFICANT CHANGE UP (ref 0.7–1.5)
EOSINOPHIL # BLD AUTO: 0.18 K/UL — SIGNIFICANT CHANGE UP (ref 0–0.7)
EOSINOPHIL NFR BLD AUTO: 1.4 % — SIGNIFICANT CHANGE UP (ref 0–8)
GLUCOSE BLDC GLUCOMTR-MCNC: 136 MG/DL — HIGH (ref 70–99)
GLUCOSE BLDC GLUCOMTR-MCNC: 169 MG/DL — HIGH (ref 70–99)
GLUCOSE BLDC GLUCOMTR-MCNC: 198 MG/DL — HIGH (ref 70–99)
GLUCOSE SERPL-MCNC: 190 MG/DL — HIGH (ref 70–99)
HCT VFR BLD CALC: 42.4 % — SIGNIFICANT CHANGE UP (ref 42–52)
HGB BLD-MCNC: 15.3 G/DL — SIGNIFICANT CHANGE UP (ref 14–18)
IMM GRANULOCYTES NFR BLD AUTO: 0.5 % — HIGH (ref 0.1–0.3)
LYMPHOCYTES # BLD AUTO: 29.2 % — SIGNIFICANT CHANGE UP (ref 20.5–51.1)
LYMPHOCYTES # BLD AUTO: 3.74 K/UL — HIGH (ref 1.2–3.4)
MAGNESIUM SERPL-MCNC: 1.8 MG/DL — SIGNIFICANT CHANGE UP (ref 1.8–2.4)
MCHC RBC-ENTMCNC: 28.9 PG — SIGNIFICANT CHANGE UP (ref 27–31)
MCHC RBC-ENTMCNC: 36.1 G/DL — SIGNIFICANT CHANGE UP (ref 32–37)
MCV RBC AUTO: 80 FL — SIGNIFICANT CHANGE UP (ref 80–94)
MONOCYTES # BLD AUTO: 0.9 K/UL — HIGH (ref 0.1–0.6)
MONOCYTES NFR BLD AUTO: 7 % — SIGNIFICANT CHANGE UP (ref 1.7–9.3)
NEUTROPHILS # BLD AUTO: 7.87 K/UL — HIGH (ref 1.4–6.5)
NEUTROPHILS NFR BLD AUTO: 61.5 % — SIGNIFICANT CHANGE UP (ref 42.2–75.2)
NRBC # BLD: 0 /100 WBCS — SIGNIFICANT CHANGE UP (ref 0–0)
PLATELET # BLD AUTO: 256 K/UL — SIGNIFICANT CHANGE UP (ref 130–400)
POTASSIUM SERPL-MCNC: 3.7 MMOL/L — SIGNIFICANT CHANGE UP (ref 3.5–5)
POTASSIUM SERPL-SCNC: 3.7 MMOL/L — SIGNIFICANT CHANGE UP (ref 3.5–5)
PROT SERPL-MCNC: 5.9 G/DL — LOW (ref 6–8)
RBC # BLD: 5.3 M/UL — SIGNIFICANT CHANGE UP (ref 4.7–6.1)
RBC # FLD: 13.3 % — SIGNIFICANT CHANGE UP (ref 11.5–14.5)
SODIUM SERPL-SCNC: 138 MMOL/L — SIGNIFICANT CHANGE UP (ref 135–146)
TROPONIN T SERPL-MCNC: 0.1 NG/ML — CRITICAL HIGH
TROPONIN T SERPL-MCNC: 0.12 NG/ML — CRITICAL HIGH
WBC # BLD: 12.81 K/UL — HIGH (ref 4.8–10.8)
WBC # FLD AUTO: 12.81 K/UL — HIGH (ref 4.8–10.8)

## 2018-09-15 RX ADMIN — Medication 81 MILLIGRAM(S): at 12:06

## 2018-09-15 RX ADMIN — INSULIN GLARGINE 24 UNIT(S): 100 INJECTION, SOLUTION SUBCUTANEOUS at 22:00

## 2018-09-15 RX ADMIN — Medication 25 MILLIGRAM(S): at 06:09

## 2018-09-15 RX ADMIN — Medication 7 UNIT(S): at 16:54

## 2018-09-15 RX ADMIN — Medication 7 UNIT(S): at 08:14

## 2018-09-15 RX ADMIN — ATORVASTATIN CALCIUM 80 MILLIGRAM(S): 80 TABLET, FILM COATED ORAL at 22:00

## 2018-09-15 RX ADMIN — Medication 1: at 08:18

## 2018-09-15 RX ADMIN — Medication 1: at 12:01

## 2018-09-15 RX ADMIN — LISINOPRIL 5 MILLIGRAM(S): 2.5 TABLET ORAL at 06:08

## 2018-09-15 RX ADMIN — PRASUGREL 10 MILLIGRAM(S): 5 TABLET, FILM COATED ORAL at 12:06

## 2018-09-15 RX ADMIN — Medication 7 UNIT(S): at 12:00

## 2018-09-15 RX ADMIN — Medication 25 MILLIGRAM(S): at 18:08

## 2018-09-15 RX ADMIN — ENOXAPARIN SODIUM 40 MILLIGRAM(S): 100 INJECTION SUBCUTANEOUS at 12:06

## 2018-09-15 NOTE — PROGRESS NOTE ADULT - ASSESSMENT
STEMI  s/p PCI  DM II        Counselling done  ASA, Effient, Statins, B blocker ,DM meds  Monitor in CCU  Plans per cardiology  Possible d/c tomorrow, if cleared by cardiology

## 2018-09-15 NOTE — PROGRESS NOTE ADULT - SUBJECTIVE AND OBJECTIVE BOX
Patient is a 48y old  Male who presents with a chief complaint of STEMI (15 Sep 2018 13:19)    HPI:  The patient is a 48 year old male who presented to [facility] ED with cc/o chest pain x2 days. He has a PMHx significant for T2DM (Metformin XR, Pioglitazone, and Insulin). The patient believed the initial chest pain was due to heart burn, which lasted a few hours and spontaneously resolved. However, the pain returned around 1:00 AM on 09/14/2018, and significantly worsened in intensity at around 6:00 AM, prompting the patient to come to the hospital. EKG showed ST elevations in V2-V4. He was subsequently taken to cath; s/p PCI to LAD and brought to CCU for observation and continued care. (14 Sep 2018 15:15)      SUBJ:  Patient seen and examined. No chest pain.      MEDICATIONS  (STANDING):  aspirin  chewable 81 milliGRAM(s) Oral daily  atorvastatin 80 milliGRAM(s) Oral at bedtime  chlorhexidine 4% Liquid 1 Application(s) Topical <User Schedule>  dextrose 5%. 1000 milliLiter(s) (50 mL/Hr) IV Continuous <Continuous>  dextrose 50% Injectable 12.5 Gram(s) IV Push once  dextrose 50% Injectable 25 Gram(s) IV Push once  dextrose 50% Injectable 25 Gram(s) IV Push once  enoxaparin Injectable 40 milliGRAM(s) SubCutaneous daily  insulin glargine Injectable (LANTUS) 24 Unit(s) SubCutaneous at bedtime  insulin lispro (HumaLOG) corrective regimen sliding scale   SubCutaneous three times a day before meals  insulin lispro Injectable (HumaLOG) 7 Unit(s) SubCutaneous before breakfast  insulin lispro Injectable (HumaLOG) 7 Unit(s) SubCutaneous before lunch  insulin lispro Injectable (HumaLOG) 7 Unit(s) SubCutaneous before dinner  lisinopril 5 milliGRAM(s) Oral daily  metoprolol tartrate 25 milliGRAM(s) Oral two times a day  prasugrel 10 milliGRAM(s) Oral daily  sodium chloride 0.9%. 450 milliLiter(s) (75 mL/Hr) IV Continuous <Continuous>    MEDICATIONS  (PRN):  dextrose 40% Gel 15 Gram(s) Oral once PRN Blood Glucose LESS THAN 70 milliGRAM(s)/deciliter  glucagon  Injectable 1 milliGRAM(s) IntraMuscular once PRN Glucose LESS THAN 70 milligrams/deciliter            Vital Signs Last 24 Hrs  T(C): 36.7 (15 Sep 2018 16:31), Max: 37.6 (15 Sep 2018 00:00)  T(F): 98 (15 Sep 2018 16:31), Max: 99.6 (15 Sep 2018 00:00)  HR: 78 (15 Sep 2018 14:00) (68 - 88)  BP: 128/76 (15 Sep 2018 16:31) (111/66 - 151/81)  BP(mean): 93 (15 Sep 2018 16:31) (84 - 110)  RR: 16 (15 Sep 2018 16:31) (13 - 22)  SpO2: 98% (15 Sep 2018 16:31) (97% - 99%)      PHYSICAL EXAM:    GEN: AAO x 3, NAD  HEENT: NC/AT, PERRL  Neck: No JVD, no bruits  CV: Reg, S1-S2, no murmur  Lungs: CTAB  Abd: Soft, non-tender  Ext: No edema      I&O's Summary    14 Sep 2018 07:01  -  15 Sep 2018 07:00  --------------------------------------------------------  IN: 925 mL / OUT: 1 mL / NET: 924 mL    15 Sep 2018 07:01  -  15 Sep 2018 17:36  --------------------------------------------------------  IN: 480 mL / OUT: 0 mL / NET: 480 mL    	        ECG:  < from: 12 Lead ECG (09.15.18 @ 08:06) >  ormal sinus rhythm  Possible Left atrial enlargement  Anteroseptal infarct , age undetermined  T wave abnormality, consider inferior ischemia Anterolateral ischemia Possible  Abnormal ECG    < end of copied text >  TTE:  < from: Transthoracic Echocardiogram (09.14.18 @ 09:20) >  Summary:   1. Left ventricular ejection fraction, by visual estimation, is 40 to   45%.   2. Mildly decreased global left ventricular systolic function.   3. Multiple left ventricular regional wall motion abnormalities exist.   See wall motion findings.   4. LV Ejection Fraction by Wilde's Method with a biplane EF of 66 %.   5. Normal left ventricular internal cavity size.   6. Spectral Doppler shows impaired relaxation pattern of left   ventricular myocardial filling (Grade I diastolic dysfunction).   7. Mild mitral valve regurgitation.   8. Mild tricuspid regurgitation.   9. Pulmonic valve regurgitation.  10. Estimated pulmonary artery systolic pressure is 35.0 mmHg assuming a   right atrial pressure of 5 mmHg, which is consistent with borderline   pulmonary hypertension.    < end of copied text >      LABS:                        15.3   12.81 )-----------( 256      ( 15 Sep 2018 06:07 )             42.4     09-15    138  |  102  |  9<L>  ----------------------------<  190<H>  3.7   |  23  |  0.7    Ca    8.9      15 Sep 2018 06:07  Mg     1.8     09-15    TPro  5.9<L>  /  Alb  3.8  /  TBili  0.6  /  DBili  x   /  AST  14  /  ALT  18  /  AlkPhos  65  09-15    CARDIAC MARKERS ( 15 Sep 2018 06:07 )  x     / 0.10 ng/mL / 56 U/L / x     / 2.9 ng/mL  CARDIAC MARKERS ( 15 Sep 2018 01:00 )  x     / 0.12 ng/mL / x     / x     / x      CARDIAC MARKERS ( 14 Sep 2018 17:16 )  x     / 0.13 ng/mL / x     / x     / x      CARDIAC MARKERS ( 14 Sep 2018 07:29 )  x     / <0.01 ng/mL / x     / x     / x          PT/INR - ( 14 Sep 2018 07:29 )   PT: 10.70 sec;   INR: 0.99 ratio         PTT - ( 14 Sep 2018 07:29 )  PTT:24.4 sec      BNP  RADIOLOGY & ADDITIONAL STUDIES:      IMPRESSION AND PLAN:

## 2018-09-15 NOTE — PROGRESS NOTE ADULT - SUBJECTIVE AND OBJECTIVE BOX
SUBJECTIVE:    Patient is a 48y old Male who presents with a chief complaint of STEMI (14 Sep 2018 15:15)    Currently admitted to medicine with the primary diagnosis of STEMI (ST elevation myocardial infarction)     Today is hospital day 1d. Overnight    PAST MEDICAL & SURGICAL HISTORY  Diabetes mellitus  No significant past surgical history    SOCIAL HISTORY:  Negative for smoking/alcohol/drug use.     ALLERGIES:  No Known Allergies    MEDICATIONS:  STANDING MEDICATIONS  aspirin  chewable 81 milliGRAM(s) Oral daily  atorvastatin 80 milliGRAM(s) Oral at bedtime  chlorhexidine 4% Liquid 1 Application(s) Topical <User Schedule>  dextrose 5%. 1000 milliLiter(s) IV Continuous <Continuous>  dextrose 50% Injectable 12.5 Gram(s) IV Push once  dextrose 50% Injectable 25 Gram(s) IV Push once  dextrose 50% Injectable 25 Gram(s) IV Push once  enoxaparin Injectable 40 milliGRAM(s) SubCutaneous daily  insulin glargine Injectable (LANTUS) 24 Unit(s) SubCutaneous at bedtime  insulin lispro (HumaLOG) corrective regimen sliding scale   SubCutaneous three times a day before meals  insulin lispro Injectable (HumaLOG) 7 Unit(s) SubCutaneous before breakfast  insulin lispro Injectable (HumaLOG) 7 Unit(s) SubCutaneous before lunch  insulin lispro Injectable (HumaLOG) 7 Unit(s) SubCutaneous before dinner  lisinopril 5 milliGRAM(s) Oral daily  metoprolol tartrate 25 milliGRAM(s) Oral two times a day  prasugrel 10 milliGRAM(s) Oral daily  sodium chloride 0.9%. 450 milliLiter(s) IV Continuous <Continuous>    PRN MEDICATIONS  dextrose 40% Gel 15 Gram(s) Oral once PRN  glucagon  Injectable 1 milliGRAM(s) IntraMuscular once PRN    VITALS:   T(F): 99.2  HR: 72  BP: 140/78  RR: 14  SpO2: 99%    LABS:                        15.4   12.65 )-----------( 255      ( 14 Sep 2018 17:16 )             42.5     09-14    138  |  98  |  11  ----------------------------<  226<H>  4.3   |  24  |  0.7    Ca    8.9      14 Sep 2018 17:16    TPro  6.2  /  Alb  3.9  /  TBili  0.6  /  DBili  x   /  AST  18  /  ALT  20  /  AlkPhos  70  09-14    PT/INR - ( 14 Sep 2018 07:29 )   PT: 10.70 sec;   INR: 0.99 ratio         PTT - ( 14 Sep 2018 07:29 )  PTT:24.4 sec      Troponin T, Serum: 0.13 ng/mL <HH> (09-14-18 @ 17:16)  Troponin T, Serum: <0.01 ng/mL (09-14-18 @ 07:29)      CARDIAC MARKERS ( 14 Sep 2018 17:16 )  x     / 0.13 ng/mL / x     / x     / x      CARDIAC MARKERS ( 14 Sep 2018 07:29 )  x     / <0.01 ng/mL / x     / x     / x          RADIOLOGY:    PHYSICAL EXAM:  GENERAL: NAD, speaks in full sentences, no signs of respiratory distress  HEAD:  Atraumatic, Normocephalic  EYES: EOMI, PERRLA, conjunctiva and sclera clear  NECK: Supple, No JVD  CHEST/LUNG: CTAB; No wheeze; No crackles; No accessory muscles used  HEART: Regular rate and rhythm; No murmurs;   ABDOMEN: Soft, Nontender, Nondistended; Bowel sounds present; No guarding  EXTREMITIES:  2+ Peripheral Pulses, No cyanosis or edema  PSYCH: AAOx3  NEUROLOGY: non-focal  SKIN: No rashes or lesions    Intravenous access:   NG tube:   Lorenzo Catheter: SUBJECTIVE:    Patient is a 48y old Male who presents with a chief complaint of STEMI (14 Sep 2018 15:15)    Currently admitted to medicine with the primary diagnosis of STEMI (ST elevation myocardial infarction)     Today is hospital day 1d. Patient report 2/10 CP like he "has gas stuck,"     PAST MEDICAL & SURGICAL HISTORY  Diabetes mellitus  No significant past surgical history    SOCIAL HISTORY:  Negative for smoking/alcohol/drug use.     ALLERGIES:  No Known Allergies    MEDICATIONS:  STANDING MEDICATIONS  aspirin  chewable 81 milliGRAM(s) Oral daily  atorvastatin 80 milliGRAM(s) Oral at bedtime  chlorhexidine 4% Liquid 1 Application(s) Topical <User Schedule>  dextrose 5%. 1000 milliLiter(s) IV Continuous <Continuous>  dextrose 50% Injectable 12.5 Gram(s) IV Push once  dextrose 50% Injectable 25 Gram(s) IV Push once  dextrose 50% Injectable 25 Gram(s) IV Push once  enoxaparin Injectable 40 milliGRAM(s) SubCutaneous daily  insulin glargine Injectable (LANTUS) 24 Unit(s) SubCutaneous at bedtime  insulin lispro (HumaLOG) corrective regimen sliding scale   SubCutaneous three times a day before meals  insulin lispro Injectable (HumaLOG) 7 Unit(s) SubCutaneous before breakfast  insulin lispro Injectable (HumaLOG) 7 Unit(s) SubCutaneous before lunch  insulin lispro Injectable (HumaLOG) 7 Unit(s) SubCutaneous before dinner  lisinopril 5 milliGRAM(s) Oral daily  metoprolol tartrate 25 milliGRAM(s) Oral two times a day  prasugrel 10 milliGRAM(s) Oral daily  sodium chloride 0.9%. 450 milliLiter(s) IV Continuous <Continuous>    PRN MEDICATIONS  dextrose 40% Gel 15 Gram(s) Oral once PRN  glucagon  Injectable 1 milliGRAM(s) IntraMuscular once PRN    VITALS:   T(F): 99.2  HR: 72  BP: 140/78  RR: 14  SpO2: 99%    LABS:                        15.4   12.65 )-----------( 255      ( 14 Sep 2018 17:16 )             42.5     09-14    138  |  98  |  11  ----------------------------<  226<H>  4.3   |  24  |  0.7    Ca    8.9      14 Sep 2018 17:16    TPro  6.2  /  Alb  3.9  /  TBili  0.6  /  DBili  x   /  AST  18  /  ALT  20  /  AlkPhos  70  09-14    PT/INR - ( 14 Sep 2018 07:29 )   PT: 10.70 sec;   INR: 0.99 ratio         PTT - ( 14 Sep 2018 07:29 )  PTT:24.4 sec      Troponin T, Serum: 0.13 ng/mL <HH> (09-14-18 @ 17:16)  Troponin T, Serum: <0.01 ng/mL (09-14-18 @ 07:29)      CARDIAC MARKERS ( 14 Sep 2018 17:16 )  x     / 0.13 ng/mL / x     / x     / x      CARDIAC MARKERS ( 14 Sep 2018 07:29 )  x     / <0.01 ng/mL / x     / x     / x          RADIOLOGY:    PHYSICAL EXAM:  GENERAL: NAD, speaks in full sentences, no signs of respiratory distress  HEAD:  Atraumatic, Normocephalic  EYES: EOMI, PERRLA, conjunctiva and sclera clear  NECK: Supple, No JVD  CHEST/LUNG: CTAB; No wheeze; No crackles; No accessory muscles used  HEART: Regular rate and rhythm; No murmurs;   ABDOMEN: Soft, Nontender, Nondistended; Bowel sounds present; No guarding  EXTREMITIES:  2+ Peripheral Pulses, No cyanosis or edema  PSYCH: AAOx3  NEUROLOGY: non-focal  SKIN: No rashes or lesions    Intravenous access:   NG tube:   Lorenzo Catheter: SUBJECTIVE:    Patient is a 48y old Male who presents with a chief complaint of STEMI (14 Sep 2018 15:15)    Currently admitted to medicine with the primary diagnosis of STEMI (ST elevation myocardial infarction)     Today is hospital day 1d. Patient report 2/10 CP like he "has gas stuck." R groin incision site mildly tender to palpation, not swollen/bleeding. Denies SOB, GI pain, urinary/BM issues.     PAST MEDICAL & SURGICAL HISTORY  Diabetes mellitus  No significant past surgical history    SOCIAL HISTORY:  Negative for smoking/alcohol/drug use.     ALLERGIES:  No Known Allergies    MEDICATIONS:  STANDING MEDICATIONS  aspirin  chewable 81 milliGRAM(s) Oral daily  atorvastatin 80 milliGRAM(s) Oral at bedtime  chlorhexidine 4% Liquid 1 Application(s) Topical <User Schedule>  dextrose 5%. 1000 milliLiter(s) IV Continuous <Continuous>  dextrose 50% Injectable 12.5 Gram(s) IV Push once  dextrose 50% Injectable 25 Gram(s) IV Push once  dextrose 50% Injectable 25 Gram(s) IV Push once  enoxaparin Injectable 40 milliGRAM(s) SubCutaneous daily  insulin glargine Injectable (LANTUS) 24 Unit(s) SubCutaneous at bedtime  insulin lispro (HumaLOG) corrective regimen sliding scale   SubCutaneous three times a day before meals  insulin lispro Injectable (HumaLOG) 7 Unit(s) SubCutaneous before breakfast  insulin lispro Injectable (HumaLOG) 7 Unit(s) SubCutaneous before lunch  insulin lispro Injectable (HumaLOG) 7 Unit(s) SubCutaneous before dinner  lisinopril 5 milliGRAM(s) Oral daily  metoprolol tartrate 25 milliGRAM(s) Oral two times a day  prasugrel 10 milliGRAM(s) Oral daily  sodium chloride 0.9%. 450 milliLiter(s) IV Continuous <Continuous>    PRN MEDICATIONS  dextrose 40% Gel 15 Gram(s) Oral once PRN  glucagon  Injectable 1 milliGRAM(s) IntraMuscular once PRN    VITALS:   T(F): 99.2  HR: 72  BP: 140/78  RR: 14  SpO2: 99%    LABS:                        15.4   12.65 )-----------( 255      ( 14 Sep 2018 17:16 )             42.5     09-14    138  |  98  |  11  ----------------------------<  226<H>  4.3   |  24  |  0.7    Ca    8.9      14 Sep 2018 17:16    TPro  6.2  /  Alb  3.9  /  TBili  0.6  /  DBili  x   /  AST  18  /  ALT  20  /  AlkPhos  70  09-14    PT/INR - ( 14 Sep 2018 07:29 )   PT: 10.70 sec;   INR: 0.99 ratio         PTT - ( 14 Sep 2018 07:29 )  PTT:24.4 sec      Troponin T, Serum: 0.13 ng/mL <HH> (09-14-18 @ 17:16)  Troponin T, Serum: <0.01 ng/mL (09-14-18 @ 07:29)      CARDIAC MARKERS ( 14 Sep 2018 17:16 )  x     / 0.13 ng/mL / x     / x     / x      CARDIAC MARKERS ( 14 Sep 2018 07:29 )  x     / <0.01 ng/mL / x     / x     / x          RADIOLOGY:    PHYSICAL EXAM:  GENERAL: NAD, speaks in full sentences, no signs of respiratory distress  HEAD:  Atraumatic, Normocephalic  EYES: EOMI, PERRLA, conjunctiva and sclera clear  NECK: Supple, No JVD  CHEST/LUNG: CTAB; No wheeze; No crackles; No accessory muscles used  HEART: Regular rate and rhythm; No murmurs;   ABDOMEN: Soft, Nontender, Nondistended; Bowel sounds present; No guarding. R groin incision mildly tender to palpation, not swollen/bleeding  EXTREMITIES:  2+ Peripheral Pulses, No cyanosis or edema  PSYCH: AAOx3  NEUROLOGY: non-focal  SKIN: No rashes or lesions    Intravenous access:   NG tube:   Lorenzo Catheter:

## 2018-09-15 NOTE — DISCHARGE NOTE ADULT - CARE PROVIDERS DIRECT ADDRESSES
[email or web address]

## 2018-09-15 NOTE — DISCHARGE NOTE ADULT - ADDITIONAL INSTRUCTIONS
Please follow up with the followin. Primary Care  2. Cardiology Please follow up with the followin. Primary Care  2. Cardiology (Dr. Cervantes) Please follow up with the followin. Primary Care (Dr. Segura)  2. Cardiology (Dr. Cervantes)

## 2018-09-15 NOTE — DISCHARGE NOTE ADULT - PLAN OF CARE
You have a prior diagnosis of Type 2 Diabetes Mellitus. Please continue to take your medications prescribed prior to coming to the hospital, and follow up with your prescriber to determine if you are receiving adequate coverage to keep your blood sugars low. You should record your blood sugar level at home multiple times daily to help your doctor adjust your medication appropriately.    It is important to have control of you blood sugars as high blood sugar can worsen your coronary artery disease. Please take your medications everyday and adhere to a low sugar and carbohydrate diet to help control your sugar levels. Medical Management You presented to the hospital with a chief complaint of chest pain. In the hospital, the EKG showed what is referred to ST elevations, which is concerning for acute myocardial infarction (also known as a heart attack). Imaging showed involvement of the left anterior descending artery (LAD). A stent was placed, and you remained stable in the CCU.    Please take the prescribed medications as instructed daily. It is important to not miss taking your medications to help prevent clots from forming on the stent placed in your heart. It is also important to take your medications daily to prevent future STEMI events and worsening of your condition.     Please follow up with cardiology this week or as soon as possible after discharge for further assessment and management.     If you experience similar symptoms of chest pain, heaviness, sweating, and/or shortness of breath, please return to the emergency department as soon as possible. Your heart occurred as a result of what is called coronary artery disease. This can occur from plaque building up and clogging your arteries. This can be managed with the medications you are being prescribed with on discharge, as well as a low fat/cholesterol diet. It is also important to have less than 2 grams of sodium and/or salt daily to keep your blood pressure low, as high cholesterol can worsen your coronary artery disease.     Please take your medications every day, and follow up with cardiology and primary care for further assessment and management of your condition.

## 2018-09-15 NOTE — DISCHARGE NOTE ADULT - PATIENT PORTAL LINK FT
You can access the [facility] Patient Portal, offered by [facility], by registering with the following website: [email or web address]

## 2018-09-15 NOTE — DISCHARGE NOTE ADULT - CARE PROVIDER_API CALL
Kodak Cervantes), Cardiovascular Disease; Internal Medicine; Interventional Cardiology; Nuclear Cardiology  [address] [place] [address]  Phone: [phone]  Fax: [phone] Kodak Cervantes), Cardiovascular Disease; Internal Medicine; Interventional Cardiology; Nuclear Cardiology  [address] [place] [address]  Phone: [phone]  Fax: [phone]    Claudio Segura), Internal Medicine  [address] [place] [address]  Phone: [phone]  Fax: [phone]

## 2018-09-15 NOTE — DISCHARGE NOTE ADULT - HOSPITAL COURSE
The patient is a 48 year old male who presented to [facility] ED with cc/o chest pain x2 days. He has a PMHx significant for T2DM (Metformin XR, Pioglitazone, and Insulin). The patient believed the initial chest pain was due to heart burn, which lasted a few hours and spontaneously resolved. However, the pain returned around 1:00 AM on 09/14/2018, and significantly worsened in intensity at around 6:00 AM, prompting the patient to come to the hospital. EKG showed ST elevations in V2-V4. He was subsequently taken to cath; s/p PCI to LAD and brought to CCU for observation and continued care. His course had no complications.

## 2018-09-15 NOTE — DISCHARGE NOTE ADULT - CARE PLAN
Principal Discharge DX:	ST elevation myocardial infarction involving left anterior descending (LAD) coronary artery  Goal:	Medical Management  Assessment and plan of treatment:	You presented to the hospital with a chief complaint of chest pain. In the hospital, the EKG showed what is referred to ST elevations, which is concerning for acute myocardial infarction (also known as a heart attack). Imaging showed involvement of the left anterior descending artery (LAD). A stent was placed, and you remained stable in the CCU.    Please take the prescribed medications as instructed daily. It is important to not miss taking your medications to help prevent clots from forming on the stent placed in your heart. It is also important to take your medications daily to prevent future STEMI events and worsening of your condition.     Please follow up with cardiology this week or as soon as possible after discharge for further assessment and management.     If you experience similar symptoms of chest pain, heaviness, sweating, and/or shortness of breath, please return to the emergency department as soon as possible.  Secondary Diagnosis:	Coronary artery disease  Goal:	Medical Management  Assessment and plan of treatment:	Your heart occurred as a result of what is called coronary artery disease. This can occur from plaque building up and clogging your arteries. This can be managed with the medications you are being prescribed with on discharge, as well as a low fat/cholesterol diet. It is also important to have less than 2 grams of sodium and/or salt daily to keep your blood pressure low, as high cholesterol can worsen your coronary artery disease.     Please take your medications every day, and follow up with cardiology and primary care for further assessment and management of your condition.  Secondary Diagnosis:	Diabetes mellitus  Goal:	Medical Management  Assessment and plan of treatment:	You have a prior diagnosis of Type 2 Diabetes Mellitus. Please continue to take your medications prescribed prior to coming to the hospital, and follow up with your prescriber to determine if you are receiving adequate coverage to keep your blood sugars low. You should record your blood sugar level at home multiple times daily to help your doctor adjust your medication appropriately.    It is important to have control of you blood sugars as high blood sugar can worsen your coronary artery disease. Please take your medications everyday and adhere to a low sugar and carbohydrate diet to help control your sugar levels.

## 2018-09-15 NOTE — DISCHARGE NOTE ADULT - INSTRUCTIONS
Please adhere to the followin. A well-balanced diet with normal daily caloric intake  2. A low sodium (salt) diet (less than 2 grams daily)  3. A low fat and cholesterol diet  4. A low sugar/carbohydrate diet    You may participate in activities as tolerated. Please ease your way back into your activities of daily living as tolerated.     Adherence to this diet and living an active lifestyle will improve your overall health.

## 2018-09-15 NOTE — DISCHARGE NOTE ADULT - OTHER SIGNIFICANT FINDINGS
CT Abdomen and Pelvis No Cont (03.13.18 @ 01:40)     EXAM:  CT ABDOMEN AND PELVIS        PROCEDURE DATE:  03/13/2018      INTERPRETATION:  CLINICAL STATEMENT: Left flank pain      COMPARISON CT: None.    OTHER STUDIES USED FOR CORRELATION: None.     FINDINGS:    LOWER CHEST: Trace bibasilar subsegmental atelectatic change.    HEPATOBILIARY: Unremarkable    SPLEEN: Unremarkable    PANCREAS: Unremarkable    ADRENAL GLANDS: Unremarkable    KIDNEYS:  No hydronephrosis. Punctate 1 mm nonobstructing right renal   calculus.    ABDOMINOPELVIC NODES: No abdominal pelvic lymphadenopathy.    PELVIC ORGANS: Unremarkable..    PERITONEUM/MESENTERY/BOWEL: No evidence of bowel obstruction. No free   intraperitoneal air or ascites. The appendix is normal caliber. Colonic   diverticulosis.    BONES/SOFT TISSUES: No acute osseous abnormality.    IMPRESSION:   No CT evidence of acute intra-abdominal pathology.    ~~~~~  Xray Chest 2 Views PA/Lat (03.10.18 @ 22:38)    IMPRESSION:    No radiographic evidence of acute cardiopulmonary disease.

## 2018-09-15 NOTE — PROGRESS NOTE ADULT - ASSESSMENT
The patient is a 48 year old male who presented to [facility] ED with cc/o chest pain x2 days. He has a PMHx significant for T2DM (Metformin XR, Pioglitazone, and Insulin). The patient believed the initial chest pain was due to heart burn, which lasted a few hours and spontaneously resolved. However, the pain returned around 1:00 AM on 09/14/2018, and significantly worsened in intensity at around 6:00 AM, prompting the patient to come to the hospital. EKG showed ST elevations in V2-V4. He was subsequently taken to cath; s/p PCI to LAD and brought to CCU for observation and continued care.    1. STEMIA s/p PCI to LAD  - Admitted to CCU  - ASA 81  - Prasugrel 30mg yesterday; now 10mg daily  - Atorvastatin 80mg PO QD  - Lisinopril 5mg PO QD  - Metoprolol Tartrate 25mg PO BID    2. Type 2 diabetes mellitus  - c/w Basal/Bolus insulin with SSI  - adjust dose as needed for tight control    DIET: DASH/TLC & Carb consisten  DVT Ppx: Lovenox 40mg SubQ  Activity: Increase as tolerated    CHG washes daily

## 2018-09-15 NOTE — PROGRESS NOTE ADULT - SUBJECTIVE AND OBJECTIVE BOX
Admit Note:    Pt seen, chart reviewed  (family at bedside)  Preceding events noted  Admitted with chest pains, STEMI case, s/p cath and PCI yesterday  DM (+)  No prior cardiac hx  Today feels better  residual R groin discomfort  REVIEW OF SYSTEMS:    Constitutional: No fever, weight loss or fatigue  ENT:  No difficulty hearing, tinnitus, vertigo; No sinus or throat pain  Neck: No pain or stiffness  Respiratory: No cough, wheezing, chills or hemoptysis  Cardiovascular: (-) chest pain, palpitations, shortness of breath, dizziness or leg swelling  Gastrointestinal: No abdominal or epigastric pain. No nausea, vomiting or hematemesis; No diarrhea or constipation. No melena or hematochezia.  Neurological: No headaches, memory loss, loss of strength, numbness or tremors  Musculoskeletal: (+) joint pain or swelling; No muscle, back or extremity pain  Psychiatric: No depression, anxiety, mood swings or difficulty sleeping    MEDICATIONS  (STANDING):  aspirin  chewable 81 milliGRAM(s) Oral daily  atorvastatin 80 milliGRAM(s) Oral at bedtime  chlorhexidine 4% Liquid 1 Application(s) Topical <User Schedule>  dextrose 5%. 1000 milliLiter(s) (50 mL/Hr) IV Continuous <Continuous>  dextrose 50% Injectable 12.5 Gram(s) IV Push once  dextrose 50% Injectable 25 Gram(s) IV Push once  dextrose 50% Injectable 25 Gram(s) IV Push once  enoxaparin Injectable 40 milliGRAM(s) SubCutaneous daily  insulin glargine Injectable (LANTUS) 24 Unit(s) SubCutaneous at bedtime  insulin lispro (HumaLOG) corrective regimen sliding scale   SubCutaneous three times a day before meals  insulin lispro Injectable (HumaLOG) 7 Unit(s) SubCutaneous before breakfast  insulin lispro Injectable (HumaLOG) 7 Unit(s) SubCutaneous before lunch  insulin lispro Injectable (HumaLOG) 7 Unit(s) SubCutaneous before dinner  lisinopril 5 milliGRAM(s) Oral daily  metoprolol tartrate 25 milliGRAM(s) Oral two times a day  prasugrel 10 milliGRAM(s) Oral daily  sodium chloride 0.9%. 450 milliLiter(s) (75 mL/Hr) IV Continuous <Continuous>    MEDICATIONS  (PRN):  dextrose 40% Gel 15 Gram(s) Oral once PRN Blood Glucose LESS THAN 70 milliGRAM(s)/deciliter  glucagon  Injectable 1 milliGRAM(s) IntraMuscular once PRN Glucose LESS THAN 70 milligrams/deciliter      Vital Signs Last 24 Hrs  T(C): 36.7 (15 Sep 2018 08:00), Max: 37.6 (15 Sep 2018 00:00)  T(F): 98.1 (15 Sep 2018 08:00), Max: 99.6 (15 Sep 2018 00:00)  HR: 84 (15 Sep 2018 10:00) (68 - 88)  BP: 112/74 (15 Sep 2018 10:00) (111/77 - 151/81)  BP(mean): 85 (15 Sep 2018 10:00) (85 - 110)  RR: 17 (15 Sep 2018 10:00) (10 - 22)  SpO2: 98% (15 Sep 2018 10:00) (97% - 99%)    PHYSICAL EXAM:    Constitutional: NAD, well-groomed, well-developed  HEENT: PERRLA, EOMI, Normal Hearing, MMM  Neck: No LAD, No JVD  Back: Normal spine flexure, No CVA tenderness  Respiratory: CTAB/L  Cardiovascular: S1 and S2, RRR, no M/G/R  Gastrointestinal: BS+, soft, NT/ND  Extremities: No peripheral edema  Vascular: 2+ peripheral pulses  Neurological: A/O x 3, no focal deficits    LABS:                        15.3   12.81 )-----------( 256      ( 15 Sep 2018 06:07 )             42.4     09-15    138  |  102  |  9<L>  ----------------------------<  190<H>  3.7   |  23  |  0.7    Ca    8.9      15 Sep 2018 06:07  Mg     1.8     09-15    TPro  5.9<L>  /  Alb  3.8  /  TBili  0.6  /  DBili  x   /  AST  14  /  ALT  18  /  AlkPhos  65  09-15    PT/INR - ( 14 Sep 2018 07:29 )   PT: 10.70 sec;   INR: 0.99 ratio         PTT - ( 14 Sep 2018 07:29 )  PTT:24.4 sec          RADIOLOGY & ADDITIONAL STUDIES:  reviewed in PACS

## 2018-09-15 NOTE — PROGRESS NOTE ADULT - ASSESSMENT
S/p STEMI, s/p PCI of mid LAD.  Mild troponin elevation - trending down.  EF mildly reduced - expect to recover, given the presentation timing, ECG and CE levels.  C/w medical therapy. C/w ASA + Effient  C/w high dose statin.  Change bb to metoprolol succinate 50 mg QD.  C/w Lisinopril.  D/M control.   Check HgA1c.  D/c home in AM tomorrow. F/u with Dr. Cervantes in 1-2 weeks.

## 2018-09-16 VITALS
HEART RATE: 70 BPM | RESPIRATION RATE: 20 BRPM | SYSTOLIC BLOOD PRESSURE: 107 MMHG | DIASTOLIC BLOOD PRESSURE: 62 MMHG | OXYGEN SATURATION: 97 %

## 2018-09-16 LAB
ANION GAP SERPL CALC-SCNC: 11 MMOL/L — SIGNIFICANT CHANGE UP (ref 7–14)
BASOPHILS # BLD AUTO: 0.05 K/UL — SIGNIFICANT CHANGE UP (ref 0–0.2)
BASOPHILS NFR BLD AUTO: 0.5 % — SIGNIFICANT CHANGE UP (ref 0–1)
BUN SERPL-MCNC: 12 MG/DL — SIGNIFICANT CHANGE UP (ref 10–20)
CALCIUM SERPL-MCNC: 9.1 MG/DL — SIGNIFICANT CHANGE UP (ref 8.5–10.1)
CHLORIDE SERPL-SCNC: 102 MMOL/L — SIGNIFICANT CHANGE UP (ref 98–110)
CO2 SERPL-SCNC: 26 MMOL/L — SIGNIFICANT CHANGE UP (ref 17–32)
CREAT SERPL-MCNC: 0.8 MG/DL — SIGNIFICANT CHANGE UP (ref 0.7–1.5)
EOSINOPHIL # BLD AUTO: 0.28 K/UL — SIGNIFICANT CHANGE UP (ref 0–0.7)
EOSINOPHIL NFR BLD AUTO: 2.6 % — SIGNIFICANT CHANGE UP (ref 0–8)
GLUCOSE BLDC GLUCOMTR-MCNC: 138 MG/DL — HIGH (ref 70–99)
GLUCOSE BLDC GLUCOMTR-MCNC: 213 MG/DL — HIGH (ref 70–99)
GLUCOSE SERPL-MCNC: 139 MG/DL — HIGH (ref 70–99)
HCT VFR BLD CALC: 42.9 % — SIGNIFICANT CHANGE UP (ref 42–52)
HGB BLD-MCNC: 15.5 G/DL — SIGNIFICANT CHANGE UP (ref 14–18)
IMM GRANULOCYTES NFR BLD AUTO: 0.6 % — HIGH (ref 0.1–0.3)
LYMPHOCYTES # BLD AUTO: 3.75 K/UL — HIGH (ref 1.2–3.4)
LYMPHOCYTES # BLD AUTO: 34.9 % — SIGNIFICANT CHANGE UP (ref 20.5–51.1)
MAGNESIUM SERPL-MCNC: 1.9 MG/DL — SIGNIFICANT CHANGE UP (ref 1.8–2.4)
MCHC RBC-ENTMCNC: 29.1 PG — SIGNIFICANT CHANGE UP (ref 27–31)
MCHC RBC-ENTMCNC: 36.1 G/DL — SIGNIFICANT CHANGE UP (ref 32–37)
MCV RBC AUTO: 80.6 FL — SIGNIFICANT CHANGE UP (ref 80–94)
MONOCYTES # BLD AUTO: 0.9 K/UL — HIGH (ref 0.1–0.6)
MONOCYTES NFR BLD AUTO: 8.4 % — SIGNIFICANT CHANGE UP (ref 1.7–9.3)
NEUTROPHILS # BLD AUTO: 5.7 K/UL — SIGNIFICANT CHANGE UP (ref 1.4–6.5)
NEUTROPHILS NFR BLD AUTO: 53 % — SIGNIFICANT CHANGE UP (ref 42.2–75.2)
NRBC # BLD: 0 /100 WBCS — SIGNIFICANT CHANGE UP (ref 0–0)
PLATELET # BLD AUTO: 250 K/UL — SIGNIFICANT CHANGE UP (ref 130–400)
POTASSIUM SERPL-MCNC: 4.1 MMOL/L — SIGNIFICANT CHANGE UP (ref 3.5–5)
POTASSIUM SERPL-SCNC: 4.1 MMOL/L — SIGNIFICANT CHANGE UP (ref 3.5–5)
RBC # BLD: 5.32 M/UL — SIGNIFICANT CHANGE UP (ref 4.7–6.1)
RBC # FLD: 13.2 % — SIGNIFICANT CHANGE UP (ref 11.5–14.5)
SODIUM SERPL-SCNC: 139 MMOL/L — SIGNIFICANT CHANGE UP (ref 135–146)
WBC # BLD: 10.74 K/UL — SIGNIFICANT CHANGE UP (ref 4.8–10.8)
WBC # FLD AUTO: 10.74 K/UL — SIGNIFICANT CHANGE UP (ref 4.8–10.8)

## 2018-09-16 RX ORDER — LISINOPRIL 2.5 MG/1
1 TABLET ORAL
Qty: 0 | Refills: 0 | COMMUNITY
Start: 2018-09-16

## 2018-09-16 RX ORDER — METOPROLOL TARTRATE 50 MG
1 TABLET ORAL
Qty: 30 | Refills: 0 | OUTPATIENT
Start: 2018-09-16 | End: 2018-10-15

## 2018-09-16 RX ORDER — LISINOPRIL 2.5 MG/1
1 TABLET ORAL
Qty: 30 | Refills: 0 | OUTPATIENT
Start: 2018-09-16 | End: 2018-10-15

## 2018-09-16 RX ORDER — PRASUGREL 5 MG/1
1 TABLET, FILM COATED ORAL
Qty: 30 | Refills: 3 | OUTPATIENT
Start: 2018-09-16 | End: 2019-01-13

## 2018-09-16 RX ORDER — ASPIRIN/CALCIUM CARB/MAGNESIUM 324 MG
1 TABLET ORAL
Qty: 0 | Refills: 0 | COMMUNITY
Start: 2018-09-16

## 2018-09-16 RX ORDER — ATORVASTATIN CALCIUM 80 MG/1
1 TABLET, FILM COATED ORAL
Qty: 0 | Refills: 0 | COMMUNITY
Start: 2018-09-16

## 2018-09-16 RX ORDER — ASPIRIN/CALCIUM CARB/MAGNESIUM 324 MG
1 TABLET ORAL
Qty: 30 | Refills: 3 | OUTPATIENT
Start: 2018-09-16 | End: 2019-01-13

## 2018-09-16 RX ORDER — PRASUGREL 5 MG/1
1 TABLET, FILM COATED ORAL
Qty: 0 | Refills: 0 | COMMUNITY
Start: 2018-09-16

## 2018-09-16 RX ORDER — ATORVASTATIN CALCIUM 80 MG/1
1 TABLET, FILM COATED ORAL
Qty: 30 | Refills: 3 | OUTPATIENT
Start: 2018-09-16 | End: 2019-01-13

## 2018-09-16 RX ADMIN — Medication 81 MILLIGRAM(S): at 11:44

## 2018-09-16 RX ADMIN — Medication 2: at 11:46

## 2018-09-16 RX ADMIN — ENOXAPARIN SODIUM 40 MILLIGRAM(S): 100 INJECTION SUBCUTANEOUS at 11:44

## 2018-09-16 RX ADMIN — Medication 7 UNIT(S): at 11:45

## 2018-09-16 RX ADMIN — Medication 25 MILLIGRAM(S): at 06:44

## 2018-09-16 RX ADMIN — Medication 7 UNIT(S): at 08:21

## 2018-09-16 RX ADMIN — LISINOPRIL 5 MILLIGRAM(S): 2.5 TABLET ORAL at 06:44

## 2018-09-16 RX ADMIN — PRASUGREL 10 MILLIGRAM(S): 5 TABLET, FILM COATED ORAL at 11:44

## 2018-09-16 NOTE — PROGRESS NOTE ADULT - ASSESSMENT
|::::::::::::::::::::::::::::::::::::::::::::::::::::::::::::::::::::::::::::::::::::::::::::::::::::::::::::::::|                        DAILY PROGRESS NOTE  |::::::::::::::::::::::::::::::::::::::::::::::::::::::::::::::::::::::::::::::::::::::::::::::::::::::::::::::::|    Patient Information:  MANN ZAFAR  /  48y  /  Male  /  MRN#: [id number]    Admitting Diagnosis:  1. [facility]    Hospital Day: 2d     Past Medical History:   Diabetes mellitus      |:::::::::::::::::::::::::::| SUBJECTIVE |:::::::::::::::::::::::::::|    OVERNIGHT EVENTS:     TODAY: Patient was seen this morning at bedside. Currently, the patient reports ....    Review of systems is otherwise negative.      |:::::::::::::::::::::::::::| OBJECTIVE |:::::::::::::::::::::::::::|    VITAL SIGNS: Last 24 Hours    PHYSICAL EXAM:  GENERAL: Awake, alert; NAD.    HEENT: Head NC/AT; Conjunctivae moist & pink, Sclera anicteric & non-injected; Oral mucosa moist with no edema, erythema, exudates.    NECK: Supple; no JVD.    CARDIO: Regular rate; Regular rhythm; S1 & S2; No S4; No S3; No M/R/G appreciated.    RESP: Equal expansion; Good entry BL; No wheezes, rales, or rhonchi appreciated.    GI: Soft; NT/ND; BS (+) x4 quadrants; No guarding; No rebound tenderness.    : Deferred.    RECTAL: Deferred.    EXT: UE and LE tone intact; UE and LE strength 5/5; No edema in UE and LE.    NEURO: PERRL; EOMI; CNII-XII grossly intact; Light touch, pressure, pinpoint sensation intact.    SKIN: Good turgor; No ecchymosis, petechiae, ulcers, lacerations appreciated.    LAB RESULTS:      Creatine Kinase, Serum: 56 U/L (09-15-18 @ 06:07)  Troponin T, Serum: 0.10 ng/mL <HH> (09-15-18 @ 06:07)  Troponin T, Serum: 0.12 ng/mL <HH> (09-15-18 @ 01:00)    MICROBIOLOGY:  None    RADIOLOGY:  No new images    ALLERGIES:  No Known Allergies    INPATIENT MEDICATIONS:  aspirin  chewable 81 milliGRAM(s) Oral daily  atorvastatin 80 milliGRAM(s) Oral at bedtime  chlorhexidine 4% Liquid 1 Application(s) Topical <User Schedule>  dextrose 5%. 1000 milliLiter(s) IV Continuous <Continuous>  dextrose 50% Injectable 12.5 Gram(s) IV Push once  dextrose 50% Injectable 25 Gram(s) IV Push once  dextrose 50% Injectable 25 Gram(s) IV Push once  enoxaparin Injectable 40 milliGRAM(s) SubCutaneous daily  insulin glargine Injectable (LANTUS) 24 Unit(s) SubCutaneous at bedtime  insulin lispro (HumaLOG) corrective regimen sliding scale   SubCutaneous three times a day before meals  insulin lispro Injectable (HumaLOG) 7 Unit(s) SubCutaneous before breakfast  insulin lispro Injectable (HumaLOG) 7 Unit(s) SubCutaneous before lunch  insulin lispro Injectable (HumaLOG) 7 Unit(s) SubCutaneous before dinner  lisinopril 5 milliGRAM(s) Oral daily  metoprolol tartrate 25 milliGRAM(s) Oral two times a day  prasugrel 10 milliGRAM(s) Oral daily  sodium chloride 0.9%. 450 milliLiter(s) IV Continuous <Continuous>    PRN MEDICATIONS  dextrose 40% Gel 15 Gram(s) Oral once PRN  glucagon  Injectable 1 milliGRAM(s) IntraMuscular once PRN    _______________________________________________________________________    |:::::::::::::::::::::::::::| ASSESSMENT/PLAN |:::::::::::::::::::::::::::|    The patient is a 48 year old male who presented to [facility] ED with cc/o chest pain x2 days. He has a PMHx significant for T2DM (Metformin XR, Pioglitazone, and Insulin). The patient believed the initial chest pain was due to heart burn, which lasted a few hours and spontaneously resolved. However, the pain returned around 1:00 AM on 09/14/2018, and significantly worsened in intensity at around 6:00 AM, prompting the patient to come to the hospital. EKG showed ST elevations in V2-V4. He was subsequently taken to cath; s/p PCI to LAD and brought to CCU for observation and continued care.    |:::::::::::::::::::::::::::::| ACUTE |:::::::::::::::::::::::::::::|    1. STEMI s/p PCI/stent to LAD  - Course not complicated; and patient doing well  - Anticipating discharge to home today  - Continue with Lisinopril 5mg PO QD  - Continue with ASA 81mg PO QD  - Continue with Atorvastatin 80mg PO QHS  - Continue with Prasugrel 10mg PO QD  - Will receive Metoprolol Succinate XR 50mg today    |:::::::::::::::::::::::::::| CHRONIC |:::::::::::::::::::::::::::|    1. Type 2 Diabetes Mellitus  - Continue with Basal / Bolus insulin as inpatient; will adjust as needed for tight control s/p STEMI  - Continue home regimen; follow up wit prescriber for tight control  - Counseled on importance of medication adherence and monitoring sugar levels multiple times daily  - A1c received by lab; pending result    _______________________________________________________________________  FEN:        > Fluid: PO intake       > Electrolytes: WNL       > Nutrition: DASH/TLC and Carb Consistent    GI PPX: Not indicated    DVT PPX: Lovenox 40mg SubQ daily    ACTIVITY:  () Ad Dorie  /  (X) Increase as Tolerated  /  () OOB w/ assist  /  () Bed Rest    BMI: 96.5 Kg    DISPO:  Patient to be discharged when condition(s) optimized.    (X) Discussion with patient and/or family regarding goals of care.  (X) Discussed Case and Plan with the Medical Attending.    |::::::::::::::::::::::::::::| CODE STATUS |::::::::::::::::::::::::::::|         () FULL     ~     (X) DNR     ~     () DNI  |:::::::::::::::::::::::::::::::::::::::::::::::::::::::::::::::::::::::::::::::::::::::::|    ------------------------------------------------------------------------------------------------------------  Please call Dr. Lee (PGY-1) with any questions/consult recs: Spectra # |::::::::::::::::::::::::::::::::::::::::::::::::::::::::::::::::::::::::::::::::::::::::::::::::::::::::::::::::|                        DAILY PROGRESS NOTE  |::::::::::::::::::::::::::::::::::::::::::::::::::::::::::::::::::::::::::::::::::::::::::::::::::::::::::::::::|    Patient Information:  MANN ZAFAR  /  48y  /  Male  /  MRN#: [id number]    Admitting Diagnosis:  1. [facility]    Hospital Day: 2d     Past Medical History:   Diabetes mellitus      |:::::::::::::::::::::::::::| SUBJECTIVE |:::::::::::::::::::::::::::|    OVERNIGHT EVENTS: None reported.    TODAY: Patient was seen this morning at bedside. Currently, the patient reports     Review of systems is otherwise negative.    |:::::::::::::::::::::::::::| OBJECTIVE |:::::::::::::::::::::::::::|    VITAL SIGNS: Last 24 Hours    PHYSICAL EXAM:  GENERAL: Awake, alert; NAD.    HEENT: Head NC/AT; Conjunctivae moist & pink, Sclera anicteric & non-injected; Oral mucosa moist with no edema, erythema, exudates.    NECK: Supple; no JVD.    CARDIO: Regular rate; Regular rhythm; S1 & S2; No S4; No S3; No M/R/G appreciated.    RESP: Equal expansion; Good entry BL; No wheezes, rales, or rhonchi appreciated.    GI: Soft; NT/ND; BS (+) x4 quadrants; No guarding; No rebound tenderness.    : Deferred.    RECTAL: Deferred.    EXT: UE and LE tone intact; UE and LE strength 5/5; No edema in UE and LE.    NEURO: PERRL; EOMI; CNII-XII grossly intact; Light touch, pressure, pinpoint sensation intact.    SKIN: Good turgor; No ecchymosis, petechiae, ulcers, lacerations appreciated.    LAB RESULTS:      Creatine Kinase, Serum: 56 U/L (09-15-18 @ 06:07)  Troponin T, Serum: 0.10 ng/mL <HH> (09-15-18 @ 06:07)  Troponin T, Serum: 0.12 ng/mL <HH> (09-15-18 @ 01:00)    MICROBIOLOGY:  None    RADIOLOGY:  No new images    ALLERGIES:  No Known Allergies    INPATIENT MEDICATIONS:  aspirin  chewable 81 milliGRAM(s) Oral daily  atorvastatin 80 milliGRAM(s) Oral at bedtime  chlorhexidine 4% Liquid 1 Application(s) Topical <User Schedule>  dextrose 5%. 1000 milliLiter(s) IV Continuous <Continuous>  dextrose 50% Injectable 12.5 Gram(s) IV Push once  dextrose 50% Injectable 25 Gram(s) IV Push once  dextrose 50% Injectable 25 Gram(s) IV Push once  enoxaparin Injectable 40 milliGRAM(s) SubCutaneous daily  insulin glargine Injectable (LANTUS) 24 Unit(s) SubCutaneous at bedtime  insulin lispro (HumaLOG) corrective regimen sliding scale   SubCutaneous three times a day before meals  insulin lispro Injectable (HumaLOG) 7 Unit(s) SubCutaneous before breakfast  insulin lispro Injectable (HumaLOG) 7 Unit(s) SubCutaneous before lunch  insulin lispro Injectable (HumaLOG) 7 Unit(s) SubCutaneous before dinner  lisinopril 5 milliGRAM(s) Oral daily  metoprolol tartrate 25 milliGRAM(s) Oral two times a day  prasugrel 10 milliGRAM(s) Oral daily  sodium chloride 0.9%. 450 milliLiter(s) IV Continuous <Continuous>    PRN MEDICATIONS  dextrose 40% Gel 15 Gram(s) Oral once PRN  glucagon  Injectable 1 milliGRAM(s) IntraMuscular once PRN    _______________________________________________________________________    |:::::::::::::::::::::::::::| ASSESSMENT/PLAN |:::::::::::::::::::::::::::|    The patient is a 48 year old male who presented to [facility] ED with cc/o chest pain x2 days. He has a PMHx significant for T2DM (Metformin XR, Pioglitazone, and Insulin). The patient believed the initial chest pain was due to heart burn, which lasted a few hours and spontaneously resolved. However, the pain returned around 1:00 AM on 09/14/2018, and significantly worsened in intensity at around 6:00 AM, prompting the patient to come to the hospital. EKG showed ST elevations in V2-V4. He was subsequently taken to cath; s/p PCI to LAD and brought to CCU for observation and continued care.    |:::::::::::::::::::::::::::::| ACUTE |:::::::::::::::::::::::::::::|    1. STEMI s/p PCI/stent to LAD; condition stable  - Course not complicated; and patient doing well  - Anticipating discharge to home today  - Continue with Lisinopril 5mg PO QD  - Continue with ASA 81mg PO QD  - Continue with Atorvastatin 80mg PO QHS  - Continue with Prasugrel 10mg PO QD  - Will receive Metoprolol Succinate XR 50mg today    |:::::::::::::::::::::::::::| CHRONIC |:::::::::::::::::::::::::::|    1. Type 2 Diabetes Mellitus; Poor control  - Continue with Basal / Bolus insulin as inpatient; will adjust as needed for tight control s/p STEMI  - Continue home regimen; follow up wit prescriber for tight control  - Counseled on importance of medication adherence and monitoring sugar levels multiple times daily  - A1c received by lab; pending result    _______________________________________________________________________  FEN:        > Fluid: PO intake       > Electrolytes: WNL       > Nutrition: DASH/TLC and Carb Consistent    GI PPX: Not indicated    DVT PPX: Lovenox 40mg SubQ daily    ACTIVITY:  () Ad Dorie  /  (X) Increase as Tolerated  /  () OOB w/ assist  /  () Bed Rest    BMI: 96.5 Kg    DISPO:  Patient to be discharged when condition(s) optimized.    (X) Discussion with patient and/or family regarding goals of care.  (X) Discussed Case and Plan with the Medical Attending.    |::::::::::::::::::::::::::::| CODE STATUS |::::::::::::::::::::::::::::|         () FULL     ~     (X) DNR     ~     () DNI  |:::::::::::::::::::::::::::::::::::::::::::::::::::::::::::::::::::::::::::::::::::::::::|    ------------------------------------------------------------------------------------------------------------  Please call Dr. Lee (PGY-1) with any questions/consult recs: Spectra # |::::::::::::::::::::::::::::::::::::::::::::::::::::::::::::::::::::::::::::::::::::::::::::::::::::::::::::::::|                        DAILY PROGRESS NOTE  |::::::::::::::::::::::::::::::::::::::::::::::::::::::::::::::::::::::::::::::::::::::::::::::::::::::::::::::::|    Patient Information:  MANN ZAFAR  /  48y  /  Male  /  MRN#: [id number]    Admitting Diagnosis:  1. [facility]    Hospital Day: 2d     Past Medical History:   Diabetes mellitus      |:::::::::::::::::::::::::::| SUBJECTIVE |:::::::::::::::::::::::::::|    OVERNIGHT EVENTS: None reported.    TODAY: Patient was seen this morning at bedside. Currently, the patient reports     Review of systems is otherwise negative.    |:::::::::::::::::::::::::::| OBJECTIVE |:::::::::::::::::::::::::::|    VITAL SIGNS: Last 24 Hours    PHYSICAL EXAM:  GENERAL: Awake, alert; NAD.    HEENT: Head NC/AT; Conjunctivae moist & pink, Sclera anicteric & non-injected; Oral mucosa moist with no edema, erythema, exudates.    NECK: Supple; no JVD.    CARDIO: Regular rate; Regular rhythm; S1 & S2; No S4; No S3; No M/R/G appreciated.    RESP: Equal expansion; Good entry BL; No wheezes, rales, or rhonchi appreciated.    GI: Soft; NT/ND; BS (+) x4 quadrants; No guarding; No rebound tenderness.    : Deferred.    RECTAL: Deferred.    EXT: UE and LE tone intact; UE and LE strength 5/5; No edema in UE and LE.    NEURO: PERRL; EOMI; CNII-XII grossly intact; Light touch, pressure, pinpoint sensation intact.    SKIN: Good turgor; No ecchymosis, petechiae, ulcers, lacerations appreciated.    LAB RESULTS:      Creatine Kinase, Serum: 56 U/L (09-15-18 @ 06:07)  Troponin T, Serum: 0.10 ng/mL <HH> (09-15-18 @ 06:07)  Troponin T, Serum: 0.12 ng/mL <HH> (09-15-18 @ 01:00)    MICROBIOLOGY:  None    RADIOLOGY:  No new images    ALLERGIES:  No Known Allergies    INPATIENT MEDICATIONS:  aspirin  chewable 81 milliGRAM(s) Oral daily  atorvastatin 80 milliGRAM(s) Oral at bedtime  chlorhexidine 4% Liquid 1 Application(s) Topical <User Schedule>  dextrose 5%. 1000 milliLiter(s) IV Continuous <Continuous>  dextrose 50% Injectable 12.5 Gram(s) IV Push once  dextrose 50% Injectable 25 Gram(s) IV Push once  dextrose 50% Injectable 25 Gram(s) IV Push once  enoxaparin Injectable 40 milliGRAM(s) SubCutaneous daily  insulin glargine Injectable (LANTUS) 24 Unit(s) SubCutaneous at bedtime  insulin lispro (HumaLOG) corrective regimen sliding scale   SubCutaneous three times a day before meals  insulin lispro Injectable (HumaLOG) 7 Unit(s) SubCutaneous before breakfast  insulin lispro Injectable (HumaLOG) 7 Unit(s) SubCutaneous before lunch  insulin lispro Injectable (HumaLOG) 7 Unit(s) SubCutaneous before dinner  lisinopril 5 milliGRAM(s) Oral daily  metoprolol tartrate 25 milliGRAM(s) Oral two times a day  prasugrel 10 milliGRAM(s) Oral daily  sodium chloride 0.9%. 450 milliLiter(s) IV Continuous <Continuous>    PRN MEDICATIONS  dextrose 40% Gel 15 Gram(s) Oral once PRN  glucagon  Injectable 1 milliGRAM(s) IntraMuscular once PRN    _______________________________________________________________________    |:::::::::::::::::::::::::::| ASSESSMENT/PLAN |:::::::::::::::::::::::::::|    The patient is a 48 year old male who presented to [facility] ED with cc/o chest pain x2 days. He has a PMHx significant for T2DM (Metformin XR, Pioglitazone, and Insulin). The patient believed the initial chest pain was due to heart burn, which lasted a few hours and spontaneously resolved. However, the pain returned around 1:00 AM on 09/14/2018, and significantly worsened in intensity at around 6:00 AM, prompting the patient to come to the hospital. EKG showed ST elevations in V2-V4. He was subsequently taken to cath; s/p PCI to LAD and brought to CCU for observation and continued care.    |:::::::::::::::::::::::::::::| ACUTE |:::::::::::::::::::::::::::::|    1. STEMI s/p PCI/stent to LAD; condition stable  - Course not complicated; and patient doing well  - Anticipating discharge to home today  - Continue with Lisinopril 5mg PO QD  - Continue with ASA 81mg PO QD  - Continue with Atorvastatin 80mg PO QHS  - Continue with Prasugrel 10mg PO QD  - Will receive Metoprolol Succinate XR 50mg today    |:::::::::::::::::::::::::::| CHRONIC |:::::::::::::::::::::::::::|    1. Type 2 Diabetes Mellitus; Poor control  - Continue with Basal / Bolus insulin as inpatient; will adjust as needed for tight control s/p STEMI  - Continue home regimen as outpatient; follow up wit prescriber for tight control  - Counseled on importance of medication adherence and monitoring sugar levels multiple times daily  - A1c received by lab; pending result    _______________________________________________________________________  FEN:        > Fluid: PO intake       > Electrolytes: WNL       > Nutrition: DASH/TLC and Carb Consistent    GI PPX: Not indicated    DVT PPX: Lovenox 40mg SubQ daily    ACTIVITY:  () Ad Dorie  /  (X) Increase as Tolerated  /  () OOB w/ assist  /  () Bed Rest    BMI: 96.5 Kg    DISPO:  Patient to be discharged when condition(s) optimized.    (X) Discussion with patient and/or family regarding goals of care.  (X) Discussed Case and Plan with the Medical Attending.    |::::::::::::::::::::::::::::| CODE STATUS |::::::::::::::::::::::::::::|         () FULL     ~     (X) DNR     ~     () DNI  |:::::::::::::::::::::::::::::::::::::::::::::::::::::::::::::::::::::::::::::::::::::::::|    ------------------------------------------------------------------------------------------------------------  Please call Dr. Lee (PGY-1) with any questions/consult recs: Spectra # |::::::::::::::::::::::::::::::::::::::::::::::::::::::::::::::::::::::::::::::::::::::::::::::::::::::::::::::::|                        DAILY PROGRESS NOTE  |::::::::::::::::::::::::::::::::::::::::::::::::::::::::::::::::::::::::::::::::::::::::::::::::::::::::::::::::|    Patient Information:  MANN ZAFAR  /  48y  /  Male  /  MRN#: [id number]    Admitting Diagnosis:  1. [facility]    Hospital Day: 2d     Past Medical History:   Diabetes mellitus      |:::::::::::::::::::::::::::| SUBJECTIVE |:::::::::::::::::::::::::::|    OVERNIGHT EVENTS: None reported.    TODAY: Patient was seen this morning at bedside. Currently, the patient reports feeling well and has no active complaints.    Review of systems is otherwise negative.    |:::::::::::::::::::::::::::| OBJECTIVE |:::::::::::::::::::::::::::|    VITAL SIGNS: Last 24 Hours    PHYSICAL EXAM:  GENERAL: Awake, alert; NAD.    HEENT: Head NC/AT; Conjunctivae moist & pink, Sclera anicteric & non-injected; Oral mucosa moist with no edema, erythema, exudates.    NECK: Supple.    CARDIO: Regular rate; Regular rhythm; S1 & S2; No S4; No S3; No M/R/G appreciated.    RESP: Equal expansion; decreased sounds, poor effort; no rales or rhonchi.     GI: Soft; NT/ND; BS (+) x4 quadrants; No guarding; No rebound tenderness.    : Deferred.    RECTAL: Deferred.    EXT: UE and LE tone intact; UE and LE strength 5/5; No edema in UE and LE.    NEURO: PERRL; EOMI; CNII-XII grossly intact.    SKIN: Good turgor.    LAB RESULTS:                        15.5   10.74 )-----------( 250      ( 16 Sep 2018 05:28 )             42.9     09-16    139  |  102  |  12  ----------------------------<  139<H>  4.1   |  26  |  0.8    Ca    9.1      16 Sep 2018 05:28  Mg     1.9     09-16    TPro  5.9<L>  /  Alb  3.8  /  TBili  0.6  /  DBili  x   /  AST  14  /  ALT  18  /  AlkPhos  65  09-15    Creatine Kinase, Serum: 56 U/L (09-15-18 @ 06:07)  Troponin T, Serum: 0.10 ng/mL <HH> (09-15-18 @ 06:07)  Troponin T, Serum: 0.12 ng/mL <HH> (09-15-18 @ 01:00)    MICROBIOLOGY:  None    RADIOLOGY:  No new images    ALLERGIES:  No Known Allergies    INPATIENT MEDICATIONS:  aspirin  chewable 81 milliGRAM(s) Oral daily  atorvastatin 80 milliGRAM(s) Oral at bedtime  chlorhexidine 4% Liquid 1 Application(s) Topical <User Schedule>  dextrose 5%. 1000 milliLiter(s) IV Continuous <Continuous>  dextrose 50% Injectable 12.5 Gram(s) IV Push once  dextrose 50% Injectable 25 Gram(s) IV Push once  dextrose 50% Injectable 25 Gram(s) IV Push once  enoxaparin Injectable 40 milliGRAM(s) SubCutaneous daily  insulin glargine Injectable (LANTUS) 24 Unit(s) SubCutaneous at bedtime  insulin lispro (HumaLOG) corrective regimen sliding scale   SubCutaneous three times a day before meals  insulin lispro Injectable (HumaLOG) 7 Unit(s) SubCutaneous before breakfast  insulin lispro Injectable (HumaLOG) 7 Unit(s) SubCutaneous before lunch  insulin lispro Injectable (HumaLOG) 7 Unit(s) SubCutaneous before dinner  lisinopril 5 milliGRAM(s) Oral daily  metoprolol tartrate 25 milliGRAM(s) Oral two times a day  prasugrel 10 milliGRAM(s) Oral daily  sodium chloride 0.9%. 450 milliLiter(s) IV Continuous <Continuous>    PRN MEDICATIONS  dextrose 40% Gel 15 Gram(s) Oral once PRN  glucagon  Injectable 1 milliGRAM(s) IntraMuscular once PRN    _______________________________________________________________________    |:::::::::::::::::::::::::::| ASSESSMENT/PLAN |:::::::::::::::::::::::::::|    The patient is a 48 year old male who presented to [facility] ED with cc/o chest pain x2 days. He has a PMHx significant for T2DM (Metformin XR, Pioglitazone, and Insulin). The patient believed the initial chest pain was due to heart burn, which lasted a few hours and spontaneously resolved. However, the pain returned around 1:00 AM on 09/14/2018, and significantly worsened in intensity at around 6:00 AM, prompting the patient to come to the hospital. EKG showed ST elevations in V2-V4. He was subsequently taken to cath; s/p PCI to LAD and brought to CCU for observation and continued care.    |:::::::::::::::::::::::::::::| ACUTE |:::::::::::::::::::::::::::::|    1. STEMI s/p PCI/stent to LAD; condition stable  - Course not complicated; and patient doing well  - Anticipating discharge to home today  - Continue with Lisinopril 5mg PO QD  - Continue with ASA 81mg PO QD  - Continue with Atorvastatin 80mg PO QHS  - Continue with Prasugrel 10mg PO QD  - Continue Metoprolol Tar; start succinate as outpatient    |:::::::::::::::::::::::::::| CHRONIC |:::::::::::::::::::::::::::|    1. Type 2 Diabetes Mellitus; Poor control  - Continue with Basal / Bolus insulin as inpatient; will adjust as needed for tight control s/p STEMI  - Continue home regimen as outpatient; follow up wit prescriber for tight control  - Counseled on importance of medication adherence and monitoring sugar levels multiple times daily  - A1c received by lab; pending result    _______________________________________________________________________  FEN:        > Fluid: PO intake       > Electrolytes: WNL       > Nutrition: DASH/TLC and Carb Consistent    GI PPX: Not indicated    DVT PPX: Lovenox 40mg SubQ daily    ACTIVITY:  () Ad Dorie  /  (X) Increase as Tolerated  /  () OOB w/ assist  /  () Bed Rest    BMI: 96.5 Kg    DISPO:  Patient to be discharged when condition(s) optimized.    (X) Discussion with patient and/or family regarding goals of care.  (X) Discussed Case and Plan with the Medical Attending.    |::::::::::::::::::::::::::::| CODE STATUS |::::::::::::::::::::::::::::|         () FULL     ~     (X) DNR     ~     () DNI  |:::::::::::::::::::::::::::::::::::::::::::::::::::::::::::::::::::::::::::::::::::::::::|    ------------------------------------------------------------------------------------------------------------  Please call Dr. Lee (PGY-1) with any questions/consult recs: Spectra #

## 2018-09-16 NOTE — PROGRESS NOTE ADULT - ATTENDING COMMENTS
as above
Preceding noted  Asymptomatic now  Cleared by cardiology  overall ok  examination unremarkable  labs ok  Meds reviewed with pt    d/c pt home today, f/u at our office in 10 days  with the cardiologist in 1-2 wks

## 2018-09-17 PROBLEM — Z00.00 ENCOUNTER FOR PREVENTIVE HEALTH EXAMINATION: Status: ACTIVE | Noted: 2018-09-17

## 2018-09-18 LAB — GLUCOSE BLDC GLUCOMTR-MCNC: 158 MG/DL — HIGH (ref 70–99)

## 2018-09-19 DIAGNOSIS — I25.10 ATHEROSCLEROTIC HEART DISEASE OF NATIVE CORONARY ARTERY WITHOUT ANGINA PECTORIS: ICD-10-CM

## 2018-09-19 DIAGNOSIS — I10 ESSENTIAL (PRIMARY) HYPERTENSION: ICD-10-CM

## 2018-09-19 DIAGNOSIS — Z79.4 LONG TERM (CURRENT) USE OF INSULIN: ICD-10-CM

## 2018-09-19 DIAGNOSIS — Z79.82 LONG TERM (CURRENT) USE OF ASPIRIN: ICD-10-CM

## 2018-09-19 DIAGNOSIS — I21.3 ST ELEVATION (STEMI) MYOCARDIAL INFARCTION OF UNSPECIFIED SITE: ICD-10-CM

## 2018-09-19 DIAGNOSIS — I21.09 ST ELEVATION (STEMI) MYOCARDIAL INFARCTION INVOLVING OTHER CORONARY ARTERY OF ANTERIOR WALL: ICD-10-CM

## 2018-09-19 DIAGNOSIS — E11.9 TYPE 2 DIABETES MELLITUS WITHOUT COMPLICATIONS: ICD-10-CM

## 2018-09-26 ENCOUNTER — APPOINTMENT (OUTPATIENT)
Dept: CARDIOLOGY | Facility: CLINIC | Age: 48
End: 2018-09-26

## 2018-09-26 VITALS
SYSTOLIC BLOOD PRESSURE: 108 MMHG | HEIGHT: 65 IN | DIASTOLIC BLOOD PRESSURE: 64 MMHG | HEART RATE: 90 BPM | BODY MASS INDEX: 27.99 KG/M2 | WEIGHT: 168 LBS

## 2018-09-26 DIAGNOSIS — I25.2 OLD MYOCARDIAL INFARCTION: ICD-10-CM

## 2018-09-26 DIAGNOSIS — Z78.9 OTHER SPECIFIED HEALTH STATUS: ICD-10-CM

## 2018-09-26 DIAGNOSIS — Z83.3 FAMILY HISTORY OF DIABETES MELLITUS: ICD-10-CM

## 2018-09-26 DIAGNOSIS — Z80.9 FAMILY HISTORY OF MALIGNANT NEOPLASM, UNSPECIFIED: ICD-10-CM

## 2018-09-26 RX ORDER — ATORVASTATIN CALCIUM 80 MG/1
80 TABLET, FILM COATED ORAL
Qty: 30 | Refills: 5 | Status: DISCONTINUED | COMMUNITY
End: 2018-09-26

## 2018-11-14 ENCOUNTER — APPOINTMENT (OUTPATIENT)
Dept: CARDIOLOGY | Facility: CLINIC | Age: 48
End: 2018-11-14

## 2018-12-10 ENCOUNTER — APPOINTMENT (OUTPATIENT)
Dept: CARDIOLOGY | Facility: CLINIC | Age: 48
End: 2018-12-10

## 2018-12-10 VITALS
HEIGHT: 65 IN | BODY MASS INDEX: 28.16 KG/M2 | SYSTOLIC BLOOD PRESSURE: 110 MMHG | DIASTOLIC BLOOD PRESSURE: 70 MMHG | WEIGHT: 169 LBS | HEART RATE: 77 BPM

## 2018-12-10 NOTE — REVIEW OF SYSTEMS
[Feeling Fatigued] : feeling fatigued [see HPI] : see HPI [Joint Pain] : joint pain [Tingling (Paresthesia)] : tingling [Negative] : Endocrine

## 2018-12-10 NOTE — ASSESSMENT
[FreeTextEntry1] : 47 yo male with pmhx and presentation as above\par cad/ami/pci of lad\par icm, htn, dm\par cont meds\par send crestor to a private rx \par repeat echo/est - reviewed and d/w the pt in detail\par repeat labs\par add CoQ10\par aggressive risk modif\par diet and act as tolerated\par rtc 8-10 weeks

## 2018-12-10 NOTE — HISTORY OF PRESENT ILLNESS
[FreeTextEntry1] : 47 yo male with pmhx as below is here for a f/up visit\par 9/18 admitted to [facility] with ami\par s/p pci of lad with vivien\par hx course sign for mild ICM\par doing well\par no active cvs complains\par ongoing fatigue and myalgia\par crestor was not approved, still taking lipitor\par complaint with meds and diet

## 2018-12-10 NOTE — PHYSICAL EXAM
[General Appearance - Well Developed] : well developed [Normal Appearance] : normal appearance [Well Groomed] : well groomed [General Appearance - Well Nourished] : well nourished [No Deformities] : no deformities [General Appearance - In No Acute Distress] : no acute distress [Normal Oral Mucosa] : normal oral mucosa [No Oral Pallor] : no oral pallor [No Oral Cyanosis] : no oral cyanosis [Normal Jugular Venous A Waves Present] : normal jugular venous A waves present [Normal Jugular Venous V Waves Present] : normal jugular venous V waves present [No Jugular Venous Finley A Waves] : no jugular venous finley A waves [Respiration, Rhythm And Depth] : normal respiratory rhythm and effort [Exaggerated Use Of Accessory Muscles For Inspiration] : no accessory muscle use [Auscultation Breath Sounds / Voice Sounds] : lungs were clear to auscultation bilaterally [Heart Rate And Rhythm] : heart rate and rhythm were normal [Heart Sounds] : normal S1 and S2 [Murmurs] : no murmurs present [Abdomen Soft] : soft [Abdomen Tenderness] : non-tender [Abdomen Mass (___ Cm)] : no abdominal mass palpated [Nail Clubbing] : no clubbing of the fingernails [Cyanosis, Localized] : no localized cyanosis [Petechial Hemorrhages (___cm)] : no petechial hemorrhages [] : no ischemic changes [Skin Color & Pigmentation] : normal skin color and pigmentation

## 2019-02-01 ENCOUNTER — OUTPATIENT (OUTPATIENT)
Dept: OUTPATIENT SERVICES | Facility: HOSPITAL | Age: 49
LOS: 1 days | End: 2019-02-01
Payer: MEDICAID

## 2019-02-03 NOTE — DISCHARGE NOTE ADULT - MEDICATION SUMMARY - MEDICATIONS TO TAKE
normal (ped)... I have personally performed a face to face diagnostic evaluation on this patient. I have reviewed the ACP note and agree with the history, exam and plan of care, except as noted. I will START or STAY ON the medications listed below when I get home from the hospital:    aspirin 81 mg oral tablet, chewable  -- 1 tab(s) by mouth once a day  -- Indication: For Coronary artery disease    lisinopril 5 mg oral tablet  -- 1 tab(s) by mouth once a day  -- Indication: For Coronary artery disease    metFORMIN 1000 mg oral tablet, extended release  -- 1 tab(s) by mouth once a day  -- Indication: For Diabetes mellitus    pioglitazone  -- Indication: For Diabetes mellitus    insulin  -- Indication: For Diabetes mellitus    atorvastatin 80 mg oral tablet  -- 1 tab(s) by mouth once a day (at bedtime)  -- Indication: For Coronary artery disease    prasugrel 10 mg oral tablet  -- 1 tab(s) by mouth once a day  -- Indication: For Coronary artery disease    metoprolol succinate 50 mg oral tablet, extended release  -- 1 tab(s) by mouth once a day   -- It is very important that you take or use this exactly as directed.  Do not skip doses or discontinue unless directed by your doctor.  May cause drowsiness.  Alcohol may intensify this effect.  Use care when operating dangerous machinery.  Some non-prescription drugs may aggravate your condition.  Read all labels carefully.  If a warning appears, check with your doctor before taking.  Swallow whole.  Do not crush.  Take with food or milk.  This drug may impair the ability to drive or operate machinery.  Use care until you become familiar with its effects.    -- Indication: For Coronary artery disease

## 2019-02-04 NOTE — DISCHARGE NOTE ADULT - MEDICATION SUMMARY - MEDICATIONS TO CHANGE
Dr. Phil Wadsworth, did you discuss with Kalin Macias that you wanted her to get a shingles shot? If so I can call her to discuss contacting her PCP about setting up a nurse visit for the shot since we don't offer that in dermatology.  Thanks I will SWITCH the dose or number of times a day I take the medications listed below when I get home from the hospital:  None

## 2019-02-08 ENCOUNTER — APPOINTMENT (OUTPATIENT)
Dept: CARDIOLOGY | Facility: CLINIC | Age: 49
End: 2019-02-08

## 2019-02-21 ENCOUNTER — INPATIENT (INPATIENT)
Facility: HOSPITAL | Age: 49
LOS: 0 days | Discharge: HOME | End: 2019-02-22
Attending: INTERNAL MEDICINE | Admitting: INTERNAL MEDICINE

## 2019-02-21 ENCOUNTER — OUTPATIENT (OUTPATIENT)
Dept: OUTPATIENT SERVICES | Facility: HOSPITAL | Age: 49
LOS: 1 days | Discharge: HOME | End: 2019-02-21

## 2019-02-21 VITALS
SYSTOLIC BLOOD PRESSURE: 116 MMHG | TEMPERATURE: 98 F | RESPIRATION RATE: 18 BRPM | OXYGEN SATURATION: 99 % | DIASTOLIC BLOOD PRESSURE: 64 MMHG | HEART RATE: 72 BPM

## 2019-02-21 DIAGNOSIS — M25.552 PAIN IN LEFT HIP: ICD-10-CM

## 2019-02-21 DIAGNOSIS — M25.551 PAIN IN RIGHT HIP: ICD-10-CM

## 2019-02-21 LAB
ALBUMIN SERPL ELPH-MCNC: 4 G/DL — SIGNIFICANT CHANGE UP (ref 3.5–5.2)
ALP SERPL-CCNC: 74 U/L — SIGNIFICANT CHANGE UP (ref 30–115)
ALT FLD-CCNC: 17 U/L — SIGNIFICANT CHANGE UP (ref 0–41)
ANION GAP SERPL CALC-SCNC: 12 MMOL/L — SIGNIFICANT CHANGE UP (ref 7–14)
AST SERPL-CCNC: 13 U/L — SIGNIFICANT CHANGE UP (ref 0–41)
BILIRUB SERPL-MCNC: 0.5 MG/DL — SIGNIFICANT CHANGE UP (ref 0.2–1.2)
BUN SERPL-MCNC: 11 MG/DL — SIGNIFICANT CHANGE UP (ref 10–20)
CALCIUM SERPL-MCNC: 9.2 MG/DL — SIGNIFICANT CHANGE UP (ref 8.5–10.1)
CHLORIDE SERPL-SCNC: 102 MMOL/L — SIGNIFICANT CHANGE UP (ref 98–110)
CO2 SERPL-SCNC: 26 MMOL/L — SIGNIFICANT CHANGE UP (ref 17–32)
CREAT SERPL-MCNC: 0.9 MG/DL — SIGNIFICANT CHANGE UP (ref 0.7–1.5)
GLUCOSE BLDC GLUCOMTR-MCNC: 129 MG/DL — HIGH (ref 70–99)
GLUCOSE BLDC GLUCOMTR-MCNC: 174 MG/DL — HIGH (ref 70–99)
GLUCOSE BLDC GLUCOMTR-MCNC: 283 MG/DL — HIGH (ref 70–99)
GLUCOSE SERPL-MCNC: 198 MG/DL — HIGH (ref 70–99)
HCT VFR BLD CALC: 42.4 % — SIGNIFICANT CHANGE UP (ref 42–52)
HGB BLD-MCNC: 15 G/DL — SIGNIFICANT CHANGE UP (ref 14–18)
MCHC RBC-ENTMCNC: 29.3 PG — SIGNIFICANT CHANGE UP (ref 27–31)
MCHC RBC-ENTMCNC: 35.4 G/DL — SIGNIFICANT CHANGE UP (ref 32–37)
MCV RBC AUTO: 82.8 FL — SIGNIFICANT CHANGE UP (ref 80–94)
NRBC # BLD: 0 /100 WBCS — SIGNIFICANT CHANGE UP (ref 0–0)
PLATELET # BLD AUTO: 214 K/UL — SIGNIFICANT CHANGE UP (ref 130–400)
POTASSIUM SERPL-MCNC: 5.1 MMOL/L — HIGH (ref 3.5–5)
POTASSIUM SERPL-SCNC: 5.1 MMOL/L — HIGH (ref 3.5–5)
PROT SERPL-MCNC: 5.9 G/DL — LOW (ref 6–8)
RBC # BLD: 5.12 M/UL — SIGNIFICANT CHANGE UP (ref 4.7–6.1)
RBC # FLD: 13.7 % — SIGNIFICANT CHANGE UP (ref 11.5–14.5)
SODIUM SERPL-SCNC: 140 MMOL/L — SIGNIFICANT CHANGE UP (ref 135–146)
TROPONIN T SERPL-MCNC: <0.01 NG/ML — SIGNIFICANT CHANGE UP
WBC # BLD: 8.61 K/UL — SIGNIFICANT CHANGE UP (ref 4.8–10.8)
WBC # FLD AUTO: 8.61 K/UL — SIGNIFICANT CHANGE UP (ref 4.8–10.8)

## 2019-02-21 RX ORDER — PRASUGREL 5 MG/1
10 TABLET, FILM COATED ORAL DAILY
Qty: 0 | Refills: 0 | Status: DISCONTINUED | OUTPATIENT
Start: 2019-02-21 | End: 2019-02-22

## 2019-02-21 RX ORDER — ATORVASTATIN CALCIUM 80 MG/1
80 TABLET, FILM COATED ORAL AT BEDTIME
Qty: 0 | Refills: 0 | Status: DISCONTINUED | OUTPATIENT
Start: 2019-02-21 | End: 2019-02-22

## 2019-02-21 RX ORDER — SODIUM CHLORIDE 9 MG/ML
1000 INJECTION INTRAMUSCULAR; INTRAVENOUS; SUBCUTANEOUS
Qty: 0 | Refills: 0 | Status: DISCONTINUED | OUTPATIENT
Start: 2019-02-21 | End: 2019-02-22

## 2019-02-21 RX ORDER — SODIUM CHLORIDE 9 MG/ML
1000 INJECTION, SOLUTION INTRAVENOUS
Qty: 0 | Refills: 0 | Status: DISCONTINUED | OUTPATIENT
Start: 2019-02-21 | End: 2019-02-22

## 2019-02-21 RX ORDER — PANTOPRAZOLE SODIUM 20 MG/1
40 TABLET, DELAYED RELEASE ORAL
Qty: 0 | Refills: 0 | Status: DISCONTINUED | OUTPATIENT
Start: 2019-02-21 | End: 2019-02-22

## 2019-02-21 RX ORDER — INSULIN GLARGINE 100 [IU]/ML
30 INJECTION, SOLUTION SUBCUTANEOUS
Qty: 0 | Refills: 0 | COMMUNITY

## 2019-02-21 RX ORDER — LISINOPRIL 2.5 MG/1
5 TABLET ORAL DAILY
Qty: 0 | Refills: 0 | Status: DISCONTINUED | OUTPATIENT
Start: 2019-02-21 | End: 2019-02-22

## 2019-02-21 RX ORDER — ENOXAPARIN SODIUM 100 MG/ML
40 INJECTION SUBCUTANEOUS DAILY
Qty: 0 | Refills: 0 | Status: DISCONTINUED | OUTPATIENT
Start: 2019-02-21 | End: 2019-02-22

## 2019-02-21 RX ORDER — INSULIN LISPRO 100/ML
VIAL (ML) SUBCUTANEOUS
Qty: 0 | Refills: 0 | Status: DISCONTINUED | OUTPATIENT
Start: 2019-02-21 | End: 2019-02-22

## 2019-02-21 RX ORDER — INSULIN LISPRO 100/ML
5 VIAL (ML) SUBCUTANEOUS
Qty: 0 | Refills: 0 | Status: DISCONTINUED | OUTPATIENT
Start: 2019-02-21 | End: 2019-02-22

## 2019-02-21 RX ORDER — DEXTROSE 50 % IN WATER 50 %
25 SYRINGE (ML) INTRAVENOUS ONCE
Qty: 0 | Refills: 0 | Status: DISCONTINUED | OUTPATIENT
Start: 2019-02-21 | End: 2019-02-22

## 2019-02-21 RX ORDER — PIOGLITAZONE HYDROCHLORIDE 15 MG/1
30 TABLET ORAL
Qty: 0 | Refills: 0 | COMMUNITY

## 2019-02-21 RX ORDER — CHLORHEXIDINE GLUCONATE 213 G/1000ML
1 SOLUTION TOPICAL
Qty: 0 | Refills: 0 | Status: DISCONTINUED | OUTPATIENT
Start: 2019-02-21 | End: 2019-02-22

## 2019-02-21 RX ORDER — GLUCAGON INJECTION, SOLUTION 0.5 MG/.1ML
1 INJECTION, SOLUTION SUBCUTANEOUS ONCE
Qty: 0 | Refills: 0 | Status: DISCONTINUED | OUTPATIENT
Start: 2019-02-21 | End: 2019-02-22

## 2019-02-21 RX ORDER — DEXTROSE 50 % IN WATER 50 %
12.5 SYRINGE (ML) INTRAVENOUS ONCE
Qty: 0 | Refills: 0 | Status: DISCONTINUED | OUTPATIENT
Start: 2019-02-21 | End: 2019-02-22

## 2019-02-21 RX ORDER — INSULIN GLARGINE 100 [IU]/ML
15 INJECTION, SOLUTION SUBCUTANEOUS AT BEDTIME
Qty: 0 | Refills: 0 | Status: DISCONTINUED | OUTPATIENT
Start: 2019-02-21 | End: 2019-02-21

## 2019-02-21 RX ORDER — INSULIN GLARGINE 100 [IU]/ML
30 INJECTION, SOLUTION SUBCUTANEOUS AT BEDTIME
Qty: 0 | Refills: 0 | Status: DISCONTINUED | OUTPATIENT
Start: 2019-02-21 | End: 2019-02-22

## 2019-02-21 RX ORDER — DEXTROSE 50 % IN WATER 50 %
15 SYRINGE (ML) INTRAVENOUS ONCE
Qty: 0 | Refills: 0 | Status: DISCONTINUED | OUTPATIENT
Start: 2019-02-21 | End: 2019-02-22

## 2019-02-21 RX ORDER — FAMOTIDINE 10 MG/ML
20 INJECTION INTRAVENOUS ONCE
Qty: 0 | Refills: 0 | Status: COMPLETED | OUTPATIENT
Start: 2019-02-21 | End: 2019-02-21

## 2019-02-21 RX ORDER — METOPROLOL TARTRATE 50 MG
50 TABLET ORAL DAILY
Qty: 0 | Refills: 0 | Status: DISCONTINUED | OUTPATIENT
Start: 2019-02-21 | End: 2019-02-22

## 2019-02-21 RX ORDER — PIOGLITAZONE HYDROCHLORIDE 15 MG/1
0 TABLET ORAL
Qty: 0 | Refills: 0 | COMMUNITY

## 2019-02-21 RX ORDER — ASPIRIN/CALCIUM CARB/MAGNESIUM 324 MG
325 TABLET ORAL ONCE
Qty: 0 | Refills: 0 | Status: COMPLETED | OUTPATIENT
Start: 2019-02-21 | End: 2019-02-21

## 2019-02-21 RX ORDER — ASPIRIN/CALCIUM CARB/MAGNESIUM 324 MG
81 TABLET ORAL DAILY
Qty: 0 | Refills: 0 | Status: DISCONTINUED | OUTPATIENT
Start: 2019-02-21 | End: 2019-02-22

## 2019-02-21 RX ADMIN — Medication 325 MILLIGRAM(S): at 13:42

## 2019-02-21 RX ADMIN — ATORVASTATIN CALCIUM 80 MILLIGRAM(S): 80 TABLET, FILM COATED ORAL at 21:36

## 2019-02-21 RX ADMIN — FAMOTIDINE 20 MILLIGRAM(S): 10 INJECTION INTRAVENOUS at 14:56

## 2019-02-21 RX ADMIN — INSULIN GLARGINE 30 UNIT(S): 100 INJECTION, SOLUTION SUBCUTANEOUS at 21:37

## 2019-02-21 NOTE — H&P ADULT - FAMILY HISTORY
Mother  Still living? Unknown  Family history of type 2 diabetes mellitus, Age at diagnosis: Age Unknown     Sibling  Still living? Unknown  Family history of type 2 diabetes mellitus, Age at diagnosis: Age Unknown     Father  Still living? No  Family history of lung cancer, Age at diagnosis: Age Unknown

## 2019-02-21 NOTE — ED PROVIDER NOTE - PROGRESS NOTE DETAILS
spoke to cardiology, aware of pt, will come evaluate.  pending labs, ekg reassuring dr. cabrera accepts admission

## 2019-02-21 NOTE — ED PROVIDER NOTE - CLINICAL SUMMARY MEDICAL DECISION MAKING FREE TEXT BOX
Pt with h/o cad with stent, with recurrent chest pain since shoveling snow yesterday.  ekg and labs reassuring, will admit for chest pain evaluation and management to dr. cabrera

## 2019-02-21 NOTE — H&P ADULT - NSHPPHYSICALEXAM_GEN_ALL_CORE
PHYSICAL EXAM:    T(F): 97.8, Max: 98 (02-21-19 @ 11:31)  HR: 66  BP: 106/55  RR: 20  SpO2: 100%    GENERAL: well-developed  HEAD:  Atraumatic  EYES: conjunctiva and sclera clear  NECK: Supple, No JVD  CHEST/LUNG: Clear to auscultation bilaterally; No wheeze  HEART: Regular rate and rhythm; No murmurs  ABDOMEN: Soft, Nontender, Nondistended; Bowel sounds present  EXTREMITIES:  2+ Peripheral Pulses, No edema  PSYCH: AAOx3  NEUROLOGY: non-focal

## 2019-02-21 NOTE — H&P ADULT - NSHPSOCIALHISTORY_GEN_ALL_CORE
Social History:    Marital Status:  ( X )    (   ) Single    (   )    (  )   Lives with: (  ) alone  (  ) children   ( X ) spouse   (  ) parents  (  ) other    Substance Use (street drugs): ( X ) never used  (  ) other:  Tobacco Usage:  (   ) never smoked   ( X ) former smoker   (   ) current smoker  (     ) pack year  (        ) last cigarette date  Alcohol Usage: Negative

## 2019-02-21 NOTE — CHART NOTE - NSCHARTNOTEFT_GEN_A_CORE
PREOPERATIVE DAY OF PROCEDURE EVALUATION:  I have personally seen and examined the patient.  I agree with the history and physical which I have reviewed and noted any changes below.  (Signed electronically by __________)  02-21-19 @ 16:46

## 2019-02-21 NOTE — ED PROVIDER NOTE - NS ED ROS FT
Review of Systems:  	•	CONSTITUTIONAL - no fever, no diaphoresis, no weight change  	•	SKIN - no rash  	•	HEMATOLOGIC - no bleeding, no bruising  	•	EYES - no eye pain, no blurred vision  	•	ENT - no change in hearing, no pain  	•	RESPIRATORY - no shortness of breath, no cough  	•	CARDIAC - + chest pain, no palpitations  	•	GI - no abd pain, no nausea, no vomiting, no diarrhea, no constipation, no bleeding  	•	 - no dysuria, no hematuria, no flank pain, no urinary retention  	•	MUSCULOSKELETAL - no joint paint, no swelling, no redness  	•	NEUROLOGIC - no weakness, no headache, no paresthesias, no dizziness  All other systems negative, unless specified in HPI

## 2019-02-21 NOTE — H&P ADULT - HISTORY OF PRESENT ILLNESS
47 yo male patient with PMHx of DM II, CAD s/p Anterior wall STEMI in september 2018 complicated my mild LV dysfunction (EF 40-45%) s/p DAWSON stent to mid LAD (with residual 60% stenosis distal LAD and 60% stenosis OM1), presenting because of new onset chest pain over the last 2 days.  Patient reports that he initially had the chest pain when he was shoveling the snow yesterday 49 yo male patient with PMHx of DM II, CAD s/p Anterior wall STEMI in september 2018 complicated my mild LV dysfunction (EF 40-45%) s/p DAWSON stent to mid LAD (with residual 60% stenosis distal LAD and 60% stenosis OM1), presenting because of new onset chest pain over the last 2 days.  Patient reports that after his MI in september, he was doing ok, no chest pain, even on exertion, up until yesterday when when he was shoveling the snow and started having retrosternal chest pain, oppressive, moderate in intensity, radiating to both shoulders, lasted for like 30 minutes, associated with shortness of breath, resolved with rest. Then patient had the pain again this morning when he was climbing stairs and when he was walking to his car.  Patient denies orthopnea, PND, lower extremities edema, palpitations.

## 2019-02-21 NOTE — ED ADULT NURSE REASSESSMENT NOTE - NS ED NURSE REASSESS COMMENT FT1
Pt assessed A/O times 4 Vs stable C.O chest pain B/L on  activity EKG done ,ED attending made aware , comfort provide placed on cardiac  monitor is seen evaluate by ED attending ready to be transport to cath lab ,report is given to RN ,

## 2019-02-21 NOTE — CHART NOTE - NSCHARTNOTEFT_GEN_A_CORE
I have personally seen and examined the patient.  I agree with the history and physical which I have reviewed and noted any changes below.  02-21-19 @ 17:50    PRE-OP DIAGNOSIS: chest pain, unstable angina, hx of CAD s/p PCI to LAD on 9/2018.     PROCEDURE: [facility]    Physician: Dr Cervantes  Assistant: Dr Baez, Dr Naylor    ANESTHESIA TYPE:  [  ]General Anesthesia  [  ] Sedation  [x  ] Local/Regional    CONDITION  [  ] Critical  [  ] Serious  [  ]Fair  [x  ]Good      IV CONTRAST:      200       mL      FINDINGS    Left Heart Catheterization:    LVEDP: normal         LEFT HEART CATHERIZATION                                    Left main  normal     LAD:  ostial/proximal lesion 90% prior to old stent (patent), diffuse distal disease                       Diag: mild disease     Left Circumflex: Prox minor irregularities, Mid/distal moderate disease   OM: small, 95% ostial lesion, moderated disease     Right Cornary Artery: minor irregularities medium size   RPDA patent       Ramus Intermed: moderate diffuse disease     INTERVENTION  PCI to ostial.prox LAD SYNERGY 3 x 12 mm                PLAN OF CARE    [x ] Return to In-patient bed  [x ]  Continue DAPT(asa, effient, B-blocker & Statin therapy) I have personally seen and examined the patient.  I agree with the history and physical which I have reviewed and noted any changes below.  02-21-19 @ 17:50    PRE-OP DIAGNOSIS: chest pain, unstable angina, hx of CAD s/p PCI to LAD on 9/2018.     PROCEDURE: [facility]    Physician: Dr Cervantes  Assistant: Dr Baez, Dr Naylor    ANESTHESIA TYPE:  [  ]General Anesthesia  [  ] Sedation  [x  ] Local/Regional    CONDITION  [  ] Critical  [  ] Serious  [  ]Fair  [x  ]Good      IV CONTRAST:      200       mL      FINDINGS    Left Heart Catheterization:    LVEDP: normal         LEFT HEART CATHERIZATION                                    Left main  normal     LAD:  ostial/proximal lesion 90% prior to old stent (patent), diffuse distal disease                       Diag: mild disease     Left Circumflex: Prox minor irregularities, Mid/distal moderate disease   OM: small, 95% ostial lesion, moderated disease     Right Cornary Artery: minor irregularities medium size   RPDA patent       Ramus Intermed: moderate diffuse disease     SYNTAX I score = 17      INTERVENTION  PCI to ostial.prox LAD SYNERGY 3 x 12 mm                PLAN OF CARE    [x ] Return to In-patient bed  [x ]  Continue DAPT(asa, effient, B-blocker & Statin therapy) I have personally seen and examined the patient.  I agree with the history and physical which I have reviewed and noted any changes below.  02-21-19 @ 17:50    PRE-OP DIAGNOSIS: chest pain, unstable angina, hx of CAD s/p PCI to LAD on 9/2018.     PROCEDURE: [facility]    Physician: Dr Cervantes  Assistant: Dr Baez, Dr Naylor    ANESTHESIA TYPE:  [  ]General Anesthesia  [  ] Sedation  [x  ] Local/Regional    CONDITION  [  ] Critical  [  ] Serious  [  ]Fair  [x  ]Good      IV CONTRAST:      200       mL      FINDINGS    Left Heart Catheterization:    LVEDP: normal         LEFT HEART CATHETERIZATION                                    Left main  normal     LAD:  ostial/proximal lesion 90% prior to old stent (patent), diffuse distal disease                       Diag: mild disease     Left Circumflex: Prox minor irregularities, Mid/distal moderate disease   OM: small, 90% ostial lesion, moderated disease     Right Coronary Artery: minor irregularities medium size   RPDA patent       Ramus Intermed: moderate diffuse disease     SYNTAX I score = 17      INTERVENTION  PCI to ostial/prox LAD SYNERGY 3 x 12 mm                PLAN OF CARE    [x ] Return to In-patient bed  [x ]  Continue DAPT(asa, effient, B-blocker & Statin therapy)

## 2019-02-21 NOTE — ED ADULT NURSE NOTE - NSIMPLEMENTINTERV_GEN_ALL_ED
Implemented All Universal Safety Interventions:  [place] to call system. Call bell, personal items and telephone within reach. Instruct patient to call for assistance. Room bathroom lighting operational. Non-slip footwear when patient is off stretcher. Physically safe environment: no spills, clutter or unnecessary equipment. Stretcher in lowest position, wheels locked, appropriate side rails in place.

## 2019-02-21 NOTE — H&P ADULT - ASSESSMENT
47 yo male patient with PMHx of DM II, CAD s/p Anterior wall STEMI in september 2018 complicated my mild LV dysfunction (EF 40-45%) s/p DAWSON stent to mid LAD (with residual 60% stenosis distal LAD and 60% stenosis OM1), presenting because of new onset chest pain over the last 2 days.    #) New-onset Chest pain on exertion  High likelihood of anginal pain  Initial ECG and cardiac enzymes negative  Recent STEMI anterior wall in september 2018 - Had residual disease in distal LAD 60% and OM1 60%    Seen by cardiology in ED, Patient taken to cath now  R/O in-stent restenosis (typical time-frame after PCI)  R/O plaque rupture (of the OM1 or distal LAD) causing unstable angina  If cath negative - R/O other non cardiac etiologies for chest pain (GI etiologies, etc...)    Continue with dual antiplatelets  Continue with high intensity statins for now (patient says he has been experiencing myalgias, and his cardiologist is trying to get him approved to the new lipid lowering agents (PCSK-9 inhibitors most likely)  Continue with ACE-inhs, beta-blockers  Consider adding anti-ischemic drugs (imdur, ranexa) 47 yo male patient with PMHx of DM II, CAD s/p Anterior wall STEMI in september 2018 complicated my mild LV dysfunction (EF 40-45%) s/p DAWSON stent to mid LAD (with residual 60% stenosis distal LAD and 60% stenosis OM1), presenting because of new onset chest pain over the last 2 days.    #) New-onset Chest pain on exertion  High likelihood of anginal pain  Initial ECG and cardiac enzymes negative  Recent STEMI anterior wall in september 2018 - Had residual disease in distal LAD 60% and OM1 60%    Seen by cardiology in ED, Patient taken to cath now  R/O in-stent restenosis (typical time-frame after PCI)  R/O plaque rupture (of the OM1 or distal LAD) causing unstable angina  If cath negative - R/O other non cardiac etiologies for chest pain (GI etiologies, etc...)    Continue with dual antiplatelets  Continue with high intensity statins for now (patient says he has been experiencing myalgias, and his cardiologist is trying to get him approved to the new lipid lowering agents (PCSK-9 inhibitors most likely)  Continue with ACE-inhs, beta-blockers  Consider adding anti-ischemic drugs (imdur, ranexa)  did not start heparin drip - patient already taken to cath lab  Could check a repeat 2d echo to see if EF has improved since his STEMI (was 40-45%)  Check Hba1c, lipid levels to see if needs more aggressive management of risk factors    #) DMII  continue with basaglar 30u at night, short acting insulin before meals  Check Hba1c to assess for diabetes control    #) Miscellaneous  DVT and GI ppx  Ambulate as tolerated  DASH diet, carb consistent  Full code  Dispo home

## 2019-02-21 NOTE — CONSULT NOTE ADULT - SUBJECTIVE AND OBJECTIVE BOX
CHIEF COMPLAINT:Patient is a 48y old  Male who presents with a chief complaint of chest pain    HISTORY OF PRESENT ILLNESS:   47 yo m with pmh of dm, htn, hld, gerd, cad with stent 5 months ago, f/u with dr. mackenzie, presents with c/o chest pain since yesterday.  pt says started while shoveling snow yesterday with radiation to both shoulders.  pt says subsided after 30 min of rest.  recurred in the middle of the night while sleeping but milder.  pt went to dr mackenzie's office and while climbing stairs, had chest pain again and while walking from car to ED had chest pain again.  pt denies dizziness.  no n/v.  no abd pain, no leg pain. patient admits to being non-compliant with meds and diet as OP    PAST MEDICAL & SURGICAL HISTORY:  Diabetes mellitus  No significant past surgical history    FAMILY HISTORY:  Family history of type 2 diabetes mellitus (Mother, Sibling)    Allergies  	  Home Medications:  insulin:  (14 Sep 2018 15:32)  metFORMIN 1000 mg oral tablet, extended release: 1 tab(s) orally once a day (14 Sep 2018 15:32)  pioglitazone:  (14 Sep 2018 15:32)    MEDICATIONS  (STANDING):  famotidine Injectable 20 milliGRAM(s) IV Push once    SOCIAL HISTORY:    [  ] active smoker  [  ] non smoker  [  ] Etoh  [  ] recreational drugs    REVIEW OF SYSTEMS:  14 point ROS negative except as mentioned above in HPI    PHYSICAL EXAM:  T(C): 36.7 (02-21-19 @ 11:31), Max: 36.7 (02-21-19 @ 11:31)  HR: 72 (02-21-19 @ 11:31) (72 - 72)  BP: 116/64 (02-21-19 @ 11:31) (116/64 - 116/64)  RR: 18 (02-21-19 @ 11:31) (18 - 18)  SpO2: 99% (02-21-19 @ 11:31) (99% - 99%)  Wt(kg): --  I&O's Summary      General Appearance: in NAD	  HEENT: NC, No JVD appreciated  Cardiovascular: Normal S1 S2, No murmurs  Respiratory: cta b/l  Gastrointestinal:  Soft, Non-tender, BS +	  Neurologic: No focal deficits, AAOx3  Extremities: ROM wnl, No clubbing/cyanosis/edema  Skin: No rashes, No ecchymoses, No cyanosis  Vascular: Peripheral pulses palpable 2+ bilaterally    LABS:	 	                        15.0   8.61  )-----------( 214      ( 21 Feb 2019 12:56 )             42.4     02-21    140  |  102  |  11  ----------------------------<  198<H>  5.1<H>   |  26  |  0.9    Ca    9.2      21 Feb 2019 12:56    TPro  5.9<L>  /  Alb  4.0  /  TBili  0.5  /  DBili  x   /  AST  13  /  ALT  17  /  AlkPhos  74  02-21    eGFR if Non African American: 101 mL/min/1.73M2 (02-21-19 @ 12:56)            CARDIAC MARKERS:  02-21-19 @ 12:56  TROPONIN-T  <0.01 ng/mL  CKMB  --  CREATINE KINASE  --      TELEMETRY EVENTS: 	    ECG:  	  RADIOLOGY:  	    PREVIOUS DIAGNOSTIC TESTING:    [ ] Echocardiogram:  [ ] Catheterization:  [ ] Stress Test: CHIEF COMPLAINT:Patient is a 48y old  Male who presents with a chief complaint of chest pain    HISTORY OF PRESENT ILLNESS:   47 yo m with pmh of dm, htn, hld, gerd, cad with stent 5 months ago, f/u with dr. mackenzie, presents with c/o chest pain since yesterday.  pt says started while shoveling snow yesterday with radiation to both shoulders.  pt says subsided after 30 min of rest.  recurred in the middle of the night while sleeping but milder.  pt went to dr mackenzie's office and while climbing stairs, had chest pain again and while walking from car to ED had chest pain again.  pt denies dizziness.  no n/v.  no abd pain, no leg pain. patient admits to being non-compliant with meds and diet as OP    PAST MEDICAL & SURGICAL HISTORY:  Diabetes mellitus  No significant past surgical history    FAMILY HISTORY:  Family history of type 2 diabetes mellitus (Mother, Sibling)    Allergies  	  Home Medications:  insulin:  (14 Sep 2018 15:32)  metFORMIN 1000 mg oral tablet, extended release: 1 tab(s) orally once a day (14 Sep 2018 15:32)  pioglitazone:  (14 Sep 2018 15:32)    MEDICATIONS  (STANDING):  famotidine Injectable 20 milliGRAM(s) IV Push once    SOCIAL HISTORY:    [  ] active smoker  [x ] non smoker  [  ] Etoh  [  ] recreational drugs    REVIEW OF SYSTEMS:  14 point ROS negative except as mentioned above in HPI    PHYSICAL EXAM:  T(C): 36.7 (02-21-19 @ 11:31), Max: 36.7 (02-21-19 @ 11:31)  HR: 72 (02-21-19 @ 11:31) (72 - 72)  BP: 116/64 (02-21-19 @ 11:31) (116/64 - 116/64)  RR: 18 (02-21-19 @ 11:31) (18 - 18)  SpO2: 99% (02-21-19 @ 11:31) (99% - 99%)  Wt(kg): --  I&O's Summary      General Appearance: in NAD	  HEENT: NC, No JVD appreciated  Cardiovascular: Normal S1 S2, No murmurs  Respiratory: cta b/l  Gastrointestinal:  Soft, Non-tender, BS +	  Neurologic: No focal deficits, AAOx3  Extremities: ROM wnl, No clubbing/cyanosis/edema  Skin: No rashes, No ecchymoses, No cyanosis  Vascular: Peripheral pulses palpable 2+ bilaterally    LABS:	 	                        15.0   8.61  )-----------( 214      ( 21 Feb 2019 12:56 )             42.4     02-21    140  |  102  |  11  ----------------------------<  198<H>  5.1<H>   |  26  |  0.9    Ca    9.2      21 Feb 2019 12:56    TPro  5.9<L>  /  Alb  4.0  /  TBili  0.5  /  DBili  x   /  AST  13  /  ALT  17  /  AlkPhos  74  02-21    eGFR if Non African American: 101 mL/min/1.73M2 (02-21-19 @ 12:56)            CARDIAC MARKERS:  02-21-19 @ 12:56  TROPONIN-T  <0.01 ng/mL  CKMB  --  CREATINE KINASE  --    ECG:  	NSR    	    PREVIOUS DIAGNOSTIC TESTING:    [x ] Echocardiogram:  < from: Transthoracic Echocardiogram (09.14.18 @ 09:20) >    Summary:   1. Left ventricular ejection fraction, by visual estimation, is 40 to   45%.   2. Mildly decreased global left ventricular systolic function.   3. Multiple left ventricular regional wall motion abnormalities exist.   See wall motion findings.   4. LV Ejection Fraction by Wilde's Method with a biplane EF of 66 %.   5. Normal left ventricular internal cavity size.   6. Spectral Doppler shows impaired relaxation pattern of left   ventricular myocardial filling (Grade I diastolic dysfunction).   7. Mild mitral valve regurgitation.   8. Mild tricuspid regurgitation.   9. Pulmonic valve regurgitation.  10. Estimated pulmonary artery systolic pressure is 35.0 mmHg assuming a   right atrial pressure of 5 mmHg, which is consistent with borderline   pulmonary hypertension.    < end of copied text >    [x ] Catheterization:  < from: Cardiac Cath Lab - Adult (09.14.18 @ 07:40) >    1ST LESION INTERVENTIONS: A successful stent with balloon angioplasty was    performed on the 95 % lesion in the mid LAD. Following intervention there    was an excellent angiographic appearance with a 0 % residual stenosis.         HEMODYNAMICS: Hemodynamic assessment demonstrates severe systemic    hypertension and moderately to severely elevated LVEDP.         CORONARY CIRCULATION: There was 1-vessel coronary artery disease ( 95 %    LAD). Mid LAD: There was a diffuse 95 % stenosis at a site with no prior    intervention. The lesion was irregularly contoured, hazy, ulcerated, and    associated with a small filling defect consistent with thrombus. There was    REANNA grade 2 flow through the vessel (partial perfusion). This is a likely    culprit for the patient's clinical presentation. An intervention was    performed.    < end of copied text > CHIEF COMPLAINT:Patient is a 48y old  Male who presents with a chief complaint of chest pain    HISTORY OF PRESENT ILLNESS:   47 yo m with pmh of dm, htn, hld, gerd, cad with stent 5 months ago, f/u with dr. mackenzie, presents with c/o chest pain since yesterday.  pt says started while shoveling snow yesterday with radiation to both shoulders.  pt says subsided after 30 min of rest.  recurred in the middle of the night while sleeping but milder.  pt went to dr mackenzie's office and while climbing stairs, had chest pain again and while walking from car to ED had chest pain again.  pt denies dizziness.  no n/v.  no abd pain, no leg pain. patient admits to being non-compliant with meds and diet as OP    PAST MEDICAL & SURGICAL HISTORY:  Diabetes mellitus  No significant past surgical history    FAMILY HISTORY:  Family history of type 2 diabetes mellitus (Mother, Sibling)    Allergies  	  Home Medications:  insulin:  (14 Sep 2018 15:32)  metFORMIN 1000 mg oral tablet, extended release: 1 tab(s) orally once a day (14 Sep 2018 15:32)  pioglitazone:  (14 Sep 2018 15:32)    MEDICATIONS  (STANDING):  famotidine Injectable 20 milliGRAM(s) IV Push once    SOCIAL HISTORY:    [  ] active smoker  [x ] non smoker  [  ] Etoh  [no] recreational drugs    REVIEW OF SYSTEMS:  14 point ROS negative except as mentioned above in HPI    PHYSICAL EXAM:  T(C): 36.7 (02-21-19 @ 11:31), Max: 36.7 (02-21-19 @ 11:31)  HR: 72 (02-21-19 @ 11:31) (72 - 72)  BP: 116/64 (02-21-19 @ 11:31) (116/64 - 116/64)  RR: 18 (02-21-19 @ 11:31) (18 - 18)  SpO2: 99% (02-21-19 @ 11:31) (99% - 99%)  Wt(kg): --  I&O's Summary      General Appearance: in NAD	  HEENT: NC, No JVD appreciated  Cardiovascular: Normal S1 S2, No murmurs  Respiratory: cta b/l  Gastrointestinal:  Soft, Non-tender, BS +	  Neurologic: No focal deficits, AAOx3  Extremities/ms: ROM wnl, No clubbing/cyanosis/edema  Skin: No rashes, No ecchymoses, No cyanosis  Vascular: Peripheral pulses palpable 2+ bilaterally    LABS:	 	                        15.0   8.61  )-----------( 214      ( 21 Feb 2019 12:56 )             42.4     02-21    140  |  102  |  11  ----------------------------<  198<H>  5.1<H>   |  26  |  0.9    Ca    9.2      21 Feb 2019 12:56    TPro  5.9<L>  /  Alb  4.0  /  TBili  0.5  /  DBili  x   /  AST  13  /  ALT  17  /  AlkPhos  74  02-21    eGFR if Non African American: 101 mL/min/1.73M2 (02-21-19 @ 12:56)            CARDIAC MARKERS:  02-21-19 @ 12:56  TROPONIN-T  <0.01 ng/mL  CKMB  --  CREATINE KINASE  --    ECG:  	NSR    	    PREVIOUS DIAGNOSTIC TESTING:    [x ] Echocardiogram:  < from: Transthoracic Echocardiogram (09.14.18 @ 09:20) >    Summary:   1. Left ventricular ejection fraction, by visual estimation, is 40 to   45%.   2. Mildly decreased global left ventricular systolic function.   3. Multiple left ventricular regional wall motion abnormalities exist.   See wall motion findings.   4. LV Ejection Fraction by Wilde's Method with a biplane EF of 66 %.   5. Normal left ventricular internal cavity size.   6. Spectral Doppler shows impaired relaxation pattern of left   ventricular myocardial filling (Grade I diastolic dysfunction).   7. Mild mitral valve regurgitation.   8. Mild tricuspid regurgitation.   9. Pulmonic valve regurgitation.  10. Estimated pulmonary artery systolic pressure is 35.0 mmHg assuming a   right atrial pressure of 5 mmHg, which is consistent with borderline   pulmonary hypertension.    < end of copied text >    [x ] Catheterization:  < from: Cardiac Cath Lab - Adult (09.14.18 @ 07:40) >    1ST LESION INTERVENTIONS: A successful stent with balloon angioplasty was    performed on the 95 % lesion in the mid LAD. Following intervention there    was an excellent angiographic appearance with a 0 % residual stenosis.         HEMODYNAMICS: Hemodynamic assessment demonstrates severe systemic    hypertension and moderately to severely elevated LVEDP.         CORONARY CIRCULATION: There was 1-vessel coronary artery disease ( 95 %    LAD). Mid LAD: There was a diffuse 95 % stenosis at a site with no prior    intervention. The lesion was irregularly contoured, hazy, ulcerated, and    associated with a small filling defect consistent with thrombus. There was    REANNA grade 2 flow through the vessel (partial perfusion). This is a likely    culprit for the patient's clinical presentation. An intervention was    performed.    < end of copied text >

## 2019-02-21 NOTE — ED PROVIDER NOTE - OBJECTIVE STATEMENT
49 yo m with pmh of dm, htn, hld, gerd, cad with stent 5 months ago, f/u with dr. mackenzie, presents with c/o chest pain since yesterday.  pt says started while shoveling snow yesterday with radiation to both shoulders.  pt says subsided after 30 min of rest.  recurred in the middle of the night while sleeping but milder.  pt went to dr mackenzie's office and while climbing stairs, had chest pain again and while walking from car to ED had chest pain again.  pt denies dizziness.  no n/v.  no abd pain, no leg pain.  takes asa and plavix

## 2019-02-21 NOTE — H&P ADULT - NSHPREVIEWOFSYSTEMS_GEN_ALL_CORE
Negative for fever, chills, nausea, vomiting, cough, abdominal pain, diarrhea, constipation, weight gain/loss, urinary symptoms.

## 2019-02-21 NOTE — H&P ADULT - NSHPLABSRESULTS_GEN_ALL_CORE
15.0   8.61  )-----------( 214      ( 21 Feb 2019 12:56 )             42.4     140  |  102  |  11  ----------------------------<  198<H>  5.1<H>   |  26  |  0.9    Ca    9.2      21 Feb 2019 12:56    TPro  5.9<L>  /  Alb  4.0  /  TBili  0.5  /  DBili  x   /  AST  13  /  ALT  17  /  AlkPhos  74  02-21    CARDIAC MARKERS ( 21 Feb 2019 12:56 )  x     / <0.01 ng/mL / x     / x     / x    ECG: Sinus rhythm at 70 bpm, no ischemic changes    CXR: Normal    TTE (9/2018): EF 40-45%, mild MR

## 2019-02-22 ENCOUNTER — TRANSCRIPTION ENCOUNTER (OUTPATIENT)
Age: 49
End: 2019-02-22

## 2019-02-22 VITALS — TEMPERATURE: 98 F | HEART RATE: 74 BPM | DIASTOLIC BLOOD PRESSURE: 56 MMHG | SYSTOLIC BLOOD PRESSURE: 105 MMHG

## 2019-02-22 DIAGNOSIS — Z02.9 ENCOUNTER FOR ADMINISTRATIVE EXAMINATIONS, UNSPECIFIED: ICD-10-CM

## 2019-02-22 DIAGNOSIS — Z71.89 OTHER SPECIFIED COUNSELING: ICD-10-CM

## 2019-02-22 LAB
ANION GAP SERPL CALC-SCNC: 9 MMOL/L — SIGNIFICANT CHANGE UP (ref 7–14)
BASOPHILS # BLD AUTO: 0.06 K/UL — SIGNIFICANT CHANGE UP (ref 0–0.2)
BASOPHILS NFR BLD AUTO: 0.8 % — SIGNIFICANT CHANGE UP (ref 0–1)
BUN SERPL-MCNC: 12 MG/DL — SIGNIFICANT CHANGE UP (ref 10–20)
CALCIUM SERPL-MCNC: 9 MG/DL — SIGNIFICANT CHANGE UP (ref 8.5–10.1)
CHLORIDE SERPL-SCNC: 104 MMOL/L — SIGNIFICANT CHANGE UP (ref 98–110)
CHOLEST SERPL-MCNC: 85 MG/DL — LOW (ref 100–200)
CK MB CFR SERPL CALC: 2.7 NG/ML — SIGNIFICANT CHANGE UP (ref 0.6–6.3)
CK SERPL-CCNC: 65 U/L — SIGNIFICANT CHANGE UP (ref 0–225)
CO2 SERPL-SCNC: 25 MMOL/L — SIGNIFICANT CHANGE UP (ref 17–32)
CREAT SERPL-MCNC: 0.9 MG/DL — SIGNIFICANT CHANGE UP (ref 0.7–1.5)
EOSINOPHIL # BLD AUTO: 0.22 K/UL — SIGNIFICANT CHANGE UP (ref 0–0.7)
EOSINOPHIL NFR BLD AUTO: 3.1 % — SIGNIFICANT CHANGE UP (ref 0–8)
ESTIMATED AVERAGE GLUCOSE: 249 MG/DL — HIGH (ref 68–114)
GLUCOSE BLDC GLUCOMTR-MCNC: 187 MG/DL — HIGH (ref 70–99)
GLUCOSE BLDC GLUCOMTR-MCNC: 252 MG/DL — HIGH (ref 70–99)
GLUCOSE SERPL-MCNC: 218 MG/DL — HIGH (ref 70–99)
HBA1C BLD-MCNC: 10.3 % — HIGH (ref 4–5.6)
HCT VFR BLD CALC: 41.8 % — LOW (ref 42–52)
HDLC SERPL-MCNC: 26 MG/DL — LOW
HGB BLD-MCNC: 14.7 G/DL — SIGNIFICANT CHANGE UP (ref 14–18)
IMM GRANULOCYTES NFR BLD AUTO: 0.4 % — HIGH (ref 0.1–0.3)
LIPID PNL WITH DIRECT LDL SERPL: 51 MG/DL — SIGNIFICANT CHANGE UP (ref 4–129)
LYMPHOCYTES # BLD AUTO: 2.29 K/UL — SIGNIFICANT CHANGE UP (ref 1.2–3.4)
LYMPHOCYTES # BLD AUTO: 32.2 % — SIGNIFICANT CHANGE UP (ref 20.5–51.1)
MCHC RBC-ENTMCNC: 29.2 PG — SIGNIFICANT CHANGE UP (ref 27–31)
MCHC RBC-ENTMCNC: 35.2 G/DL — SIGNIFICANT CHANGE UP (ref 32–37)
MCV RBC AUTO: 82.9 FL — SIGNIFICANT CHANGE UP (ref 80–94)
MONOCYTES # BLD AUTO: 0.61 K/UL — HIGH (ref 0.1–0.6)
MONOCYTES NFR BLD AUTO: 8.6 % — SIGNIFICANT CHANGE UP (ref 1.7–9.3)
NEUTROPHILS # BLD AUTO: 3.9 K/UL — SIGNIFICANT CHANGE UP (ref 1.4–6.5)
NEUTROPHILS NFR BLD AUTO: 54.9 % — SIGNIFICANT CHANGE UP (ref 42.2–75.2)
NRBC # BLD: 0 /100 WBCS — SIGNIFICANT CHANGE UP (ref 0–0)
PLATELET # BLD AUTO: 186 K/UL — SIGNIFICANT CHANGE UP (ref 130–400)
POTASSIUM SERPL-MCNC: 4.1 MMOL/L — SIGNIFICANT CHANGE UP (ref 3.5–5)
POTASSIUM SERPL-SCNC: 4.1 MMOL/L — SIGNIFICANT CHANGE UP (ref 3.5–5)
RBC # BLD: 5.04 M/UL — SIGNIFICANT CHANGE UP (ref 4.7–6.1)
RBC # FLD: 13.6 % — SIGNIFICANT CHANGE UP (ref 11.5–14.5)
SODIUM SERPL-SCNC: 138 MMOL/L — SIGNIFICANT CHANGE UP (ref 135–146)
TOTAL CHOLESTEROL/HDL RATIO MEASUREMENT: 3.3 RATIO — LOW (ref 4–5.5)
TRIGL SERPL-MCNC: 98 MG/DL — SIGNIFICANT CHANGE UP (ref 10–149)
TROPONIN T SERPL-MCNC: 0.02 NG/ML — HIGH
TSH SERPL-MCNC: 2.15 UIU/ML — SIGNIFICANT CHANGE UP (ref 0.27–4.2)
WBC # BLD: 7.11 K/UL — SIGNIFICANT CHANGE UP (ref 4.8–10.8)
WBC # FLD AUTO: 7.11 K/UL — SIGNIFICANT CHANGE UP (ref 4.8–10.8)

## 2019-02-22 RX ORDER — ATORVASTATIN CALCIUM 80 MG/1
1 TABLET, FILM COATED ORAL
Qty: 30 | Refills: 0 | OUTPATIENT
Start: 2019-02-22 | End: 2019-03-23

## 2019-02-22 RX ORDER — PRASUGREL 5 MG/1
1 TABLET, FILM COATED ORAL
Qty: 30 | Refills: 3 | OUTPATIENT
Start: 2019-02-22 | End: 2019-06-21

## 2019-02-22 RX ORDER — EZETIMIBE 10 MG/1
1 TABLET ORAL
Qty: 30 | Refills: 0 | OUTPATIENT
Start: 2019-02-22 | End: 2019-03-23

## 2019-02-22 RX ADMIN — Medication 81 MILLIGRAM(S): at 11:11

## 2019-02-22 RX ADMIN — Medication 50 MILLIGRAM(S): at 06:18

## 2019-02-22 RX ADMIN — Medication 5 UNIT(S): at 11:10

## 2019-02-22 RX ADMIN — Medication 3: at 11:10

## 2019-02-22 RX ADMIN — LISINOPRIL 5 MILLIGRAM(S): 2.5 TABLET ORAL at 06:18

## 2019-02-22 RX ADMIN — ENOXAPARIN SODIUM 40 MILLIGRAM(S): 100 INJECTION SUBCUTANEOUS at 11:11

## 2019-02-22 RX ADMIN — PANTOPRAZOLE SODIUM 40 MILLIGRAM(S): 20 TABLET, DELAYED RELEASE ORAL at 06:18

## 2019-02-22 RX ADMIN — PRASUGREL 10 MILLIGRAM(S): 5 TABLET, FILM COATED ORAL at 11:11

## 2019-02-22 NOTE — DISCHARGE NOTE ADULT - MEDICATION SUMMARY - MEDICATIONS TO CHANGE
I will SWITCH the dose or number of times a day I take the medications listed below when I get home from the hospital:  None I will SWITCH the dose or number of times a day I take the medications listed below when I get home from the hospital:    atorvastatin 80 mg oral tablet  -- 1 tab(s) by mouth once a day (at bedtime)

## 2019-02-22 NOTE — DISCHARGE NOTE ADULT - CARE PROVIDER_API CALL
Claudio Segura)  Internal Medicine  [address] [place] [address]  Phone: [phone]  Fax: [phone]  Follow Up Time:     Kodak Cervantes)  Cardiovascular Disease; Internal Medicine; Interventional Cardiology; Nuclear Cardiology  [address] [place] [address]  Phone: [phone]  Fax: [phone]  Follow Up Time:     Trevor Raines)  Internal Medicine  [address] [place] [address]  Phone: [phone]  Fax: [phone]  Follow Up Time:

## 2019-02-22 NOTE — DISCHARGE NOTE ADULT - PATIENT PORTAL LINK FT
You can access the [facility] Patient Portal, offered by [facility], by registering with the following website: [email or web address]

## 2019-02-22 NOTE — DISCHARGE NOTE ADULT - ADDITIONAL INSTRUCTIONS
Follow up with:  PCP Dr. Segura in 1-2 weeks  Cardiologist Dr. Cervantes in 1 week  Endocrinologist Dr. Raines in 1-2 weeks Follow up with:  PCP Dr. Segura in 1 weeks  Cardiologist Dr. Cervantes in 1 week  Endocrinologist Dr. Raines in 1-2 weeks

## 2019-02-22 NOTE — DISCHARGE NOTE ADULT - PLAN OF CARE
Continue medical management and follow up with Endocrinologist You currently have uncontrolled diabetes. You will need to continue your medication and maintain a low sugar diet. As per your Cardiologist Dr. Cervantes, you will need to hold Metformin for today and tomorrow. Please resume Metformin on 2/24. We also added a new medication Ezetimibe 10mg once daily. Please continue to follow up with your Endocrinologist for further medical care, such as routine ophthalmologist and podiatrist visit. You will also need to do routine foot check to avoid any unsuspected wounds. Resolution of symptoms and continue medical therapy Your chest pain is likely caused by your underlying coronary artery diseases. You underwent cardiac catheterization yesterday and one stent was placed. You will need to continue to take the prescribed medication, especially aspirin and Effient, which are important to keep the stent open. Your disease is stable now and you do not exhibit any symptoms such as chest pain or shortness of breath on exertion. Maintain a low fat and low sugar diet. Please follow up with your primary care physician/Cardiologist routinely to monitor your lipid profile, blood pressure, and blood sugar.

## 2019-02-22 NOTE — CONSULT NOTE ADULT - SUBJECTIVE AND OBJECTIVE BOX
Reason for Endocrinology Consult: Diabetes    HPI: 48y Male    Diabetes Type:  Duration of Diabetes:  Diabetes Complications:  History of DKA?  Eye doctor within past year?  Current Therapy:      Home FSG:  Fasting:  Lunch:  Dinner:  Bedtime:    Hypoglycemia?    Neuroglycopenia within past year?    Outpatient Endocrinologist?    PAST MEDICAL & SURGICAL HISTORY:  Heart failure  Coronary artery disease  Diabetes mellitus  No significant past surgical history    FAMILY HISTORY:  Family history of lung cancer (Father)  Family history of type 2 diabetes mellitus (Mother, Sibling)      SH:  Smoking  Etoh:  Recreational Drugs:    Home Medications:  Basaglar KwikPen 100 units/mL subcutaneous solution: 30 unit(s) subcutaneous once a day (at bedtime) (21 Feb 2019 15:28)  metFORMIN 1000 mg oral tablet, extended release: 1 tab(s) orally once a day (14 Sep 2018 15:32)  pioglitazone: 30 milligram(s) orally once a day (21 Feb 2019 15:29)      Current (Non-Endocrine) Meds:  aspirin  chewable 81 milliGRAM(s) Oral daily  chlorhexidine 4% Liquid 1 Application(s) Topical <User Schedule>  dextrose 5%. 1000 milliLiter(s) IV Continuous <Continuous>  enoxaparin Injectable 40 milliGRAM(s) SubCutaneous daily  lisinopril 5 milliGRAM(s) Oral daily  metoprolol succinate ER 50 milliGRAM(s) Oral daily  pantoprazole    Tablet 40 milliGRAM(s) Oral before breakfast  prasugrel 10 milliGRAM(s) Oral daily  sodium chloride 0.9%. 1000 milliLiter(s) IV Continuous <Continuous>      Current Endocrine Meds:   atorvastatin 80 milliGRAM(s) Oral at bedtime  dextrose 40% Gel 15 Gram(s) Oral once PRN  dextrose 50% Injectable 12.5 Gram(s) IV Push once  dextrose 50% Injectable 25 Gram(s) IV Push once  dextrose 50% Injectable 25 Gram(s) IV Push once  glucagon  Injectable 1 milliGRAM(s) IntraMuscular once PRN  insulin glargine Injectable (LANTUS) 30 Unit(s) SubCutaneous at bedtime  insulin lispro (HumaLOG) corrective regimen sliding scale   SubCutaneous three times a day before meals  insulin lispro Injectable (HumaLOG) 5 Unit(s) SubCutaneous three times a day before meals      Allergies:  No Known Allergies      ROS:  Denies the following except as indicated.    General: weight loss/weight gain, decreased appetite, fatigue, fever  Eyes: blurry vision, double vision  ENT: neck swelling, dysphagia, voice changes   CV: palpitations, SOB, chest pain, cough  GI: nausea, vomiting, diarrhea, constipation, abdominal pain  : nocturia,  polyuria, dysuria  Endo: decreased libido, heat/cold intolerance, jitteriness  MSK: arthralgias, myalgias  Skin: rash, dryness, diaphoresis  Neuro: pedal numbness,pedal paresthesias, pedal pain    Height (cm): 165.1 (02-21 @ 19:15)  Weight (kg): 80.3 (02-21 @ 19:15)  BMI (kg/m2): 29.5 (02-21 @ 19:15)    Vital Signs Last 24 Hrs  T(C): 35.9 (22 Feb 2019 05:40), Max: 36.8 (21 Feb 2019 19:15)  T(F): 96.7 (22 Feb 2019 05:40), Max: 98.2 (21 Feb 2019 19:15)  HR: 81 (22 Feb 2019 05:40) (66 - 84)  BP: 114/62 (22 Feb 2019 05:40) (106/55 - 133/66)  BP(mean): --  RR: 17 (22 Feb 2019 05:40) (17 - 20)  SpO2: 100% (21 Feb 2019 15:03) (99% - 100%)  Constitutional: WN/WD in NAD.   Neck: no thyromegaly or palpable thyroid nodules   Respiratory: lungs CTAB.  Cardiovascular: regular rate and rhythm, normal S1 and S2, no audible murmurs  GI: soft, NT/ND, no masses/HSM appreciated.  Ext: no edema, no ulcers, pedal pulses palpable bilaterally  Neurology: no tremor, monofilament sensation intact in feet  Psychiatric: A&O x 3, normal affect/mood.        LABS:                        15.0   8.61  )-----------( 214      ( 21 Feb 2019 12:56 )             42.4     02-21    140  |  102  |  11  ----------------------------<  198<H>  5.1<H>   |  26  |  0.9    Ca    9.2      21 Feb 2019 12:56    TPro  5.9<L>  /  Alb  4.0  /  TBili  0.5  /  DBili  x   /  AST  13  /  ALT  17  /  AlkPhos  74  02-21                                       RADIOLOGY & ADDITIONAL STUDIES:    A/P:48yMale    1.  DM  For now:  Glargine-    units at bedtime  Lispro-    units three times a day before meals   Will continue to monitor     At discharge, recommend:      Pt can follow up at discharge with:    Above discussed with resident.

## 2019-02-22 NOTE — PROGRESS NOTE ADULT - ASSESSMENT
Unstable angina  CAD  DM II  s/p PCI        Possible D/C today  Will leave disposition upto cardiology team  Pt will f/u at our office next week

## 2019-02-22 NOTE — PROGRESS NOTE ADULT - SUBJECTIVE AND OBJECTIVE BOX
Pt seen, chart reviewed  Preceding events since admission reviewed  Presented with recurrent chest pains x 2days  Recent PCI done in 9/2018 was asymptomatic till 2 days ago, admits to non compliant for 2 wks with the effient  Has been compliant with other meds  Taken to cath lab 2/21 and had another stent placement, previous stent patent.        SOCIAL HISTORY:n/a    REVIEW OF SYSTEMS:    Constitutional: No fever, weight loss or fatigue  ENT:  No difficulty hearing, tinnitus, vertigo; No sinus or throat pain  Neck: No pain or stiffness  Respiratory: No cough, wheezing, chills or hemoptysis  Cardiovascular: No chest pain, palpitations, shortness of breath, dizziness or leg swelling  Gastrointestinal: No abdominal or epigastric pain. No nausea, vomiting or hematemesis; No diarrhea or constipation. No melena or hematochezia.  Neurological: No headaches, memory loss, loss of strength, numbness or tremors  Musculoskeletal: No joint pain or swelling; No muscle, back or extremity pain  Psychiatric: No depression, anxiety, mood swings or difficulty sleeping    MEDICATIONS  (STANDING):  aspirin  chewable 81 milliGRAM(s) Oral daily  atorvastatin 80 milliGRAM(s) Oral at bedtime  chlorhexidine 4% Liquid 1 Application(s) Topical <User Schedule>  dextrose 5%. 1000 milliLiter(s) (50 mL/Hr) IV Continuous <Continuous>  dextrose 50% Injectable 12.5 Gram(s) IV Push once  dextrose 50% Injectable 25 Gram(s) IV Push once  dextrose 50% Injectable 25 Gram(s) IV Push once  enoxaparin Injectable 40 milliGRAM(s) SubCutaneous daily  insulin glargine Injectable (LANTUS) 30 Unit(s) SubCutaneous at bedtime  insulin lispro (HumaLOG) corrective regimen sliding scale   SubCutaneous three times a day before meals  insulin lispro Injectable (HumaLOG) 5 Unit(s) SubCutaneous three times a day before meals  lisinopril 5 milliGRAM(s) Oral daily  metoprolol succinate ER 50 milliGRAM(s) Oral daily  pantoprazole    Tablet 40 milliGRAM(s) Oral before breakfast  prasugrel 10 milliGRAM(s) Oral daily  sodium chloride 0.9%. 1000 milliLiter(s) (75 mL/Hr) IV Continuous <Continuous>    MEDICATIONS  (PRN):  dextrose 40% Gel 15 Gram(s) Oral once PRN Blood Glucose LESS THAN 70 milliGRAM(s)/deciliter  glucagon  Injectable 1 milliGRAM(s) IntraMuscular once PRN Glucose LESS THAN 70 milligrams/deciliter      Vital Signs Last 24 Hrs  T(C): 35.9 (22 Feb 2019 05:40), Max: 36.8 (21 Feb 2019 19:15)  T(F): 96.7 (22 Feb 2019 05:40), Max: 98.2 (21 Feb 2019 19:15)  HR: 81 (22 Feb 2019 05:40) (66 - 84)  BP: 114/62 (22 Feb 2019 05:40) (106/55 - 133/66)  BP(mean): --  RR: 17 (22 Feb 2019 05:40) (17 - 20)  SpO2: 100% (21 Feb 2019 15:03) (99% - 100%)    PHYSICAL EXAM:    Constitutional: NAD, well-groomed, well-developed  HEENT: PERRLA, EOMI, Normal Hearing, MMM  Neck: No LAD, No JVD  Back: Normal spine flexure, No CVA tenderness  Respiratory: CTAB/L  Cardiovascular: S1 and S2, RRR, no M/G/R  Gastrointestinal: BS+, soft, NT/ND  Extremities: No peripheral edema    Neurological: A/O x 3, no focal deficits    LABS:                        15.0   8.61  )-----------( 214      ( 21 Feb 2019 12:56 )             42.4     02-21    140  |  102  |  11  ----------------------------<  198<H>  5.1<H>   |  26  |  0.9    Ca    9.2      21 Feb 2019 12:56    TPro  5.9<L>  /  Alb  4.0  /  TBili  0.5  /  DBili  x   /  AST  13  /  ALT  17  /  AlkPhos  74  02-21              RADIOLOGY & ADDITIONAL STUDIES:  noted in PACS

## 2019-02-22 NOTE — DISCHARGE NOTE ADULT - CARE PROVIDERS DIRECT ADDRESSES
,[email or web address]

## 2019-02-22 NOTE — DISCHARGE NOTE ADULT - HOSPITAL COURSE
49 yo male patient with PMHx of DM II, CAD s/p Anterior wall STEMI in september 2018 complicated my mild LV dysfunction (EF 40-45%) s/p DAWSON stent to mid LAD (with residual 60% stenosis distal LAD and 60% stenosis OM1), presenting because of new onset chest pain over the last 2 days.  Patient reports that after his MI in september, he was doing ok, no chest pain, even on exertion, up until yesterday when when he was shoveling the snow and started having retrosternal chest pain, oppressive, moderate in intensity, radiating to both shoulders, lasted for like 30 minutes, associated with shortness of breath, resolved with rest. Then patient had the pain again this morning when he was climbing stairs and when he was walking to his car.  Patient denies orthopnea, PND, lower extremities edema, palpitations.    Patient underwent cardiac cath on 2/21 and it shows 90% stenotic OM at LCX. One stent was placed and patient tolerated the procedure well. No symptoms after the procedure. Patient has been evaluated by the Cardio team and is cleared to be discharge. Patient is stable to go home and is advised to follow up with his PCP, Cardiologist, and Endocrinologist.

## 2019-02-22 NOTE — DISCHARGE NOTE ADULT - MEDICATION SUMMARY - MEDICATIONS TO TAKE
I will START or STAY ON the medications listed below when I get home from the hospital:    aspirin 81 mg oral tablet, chewable  -- 1 tab(s) by mouth once a day  -- Indication: For CAD    lisinopril 5 mg oral tablet  -- 1 tab(s) by mouth once a day  -- Indication: For CAD    metFORMIN 1000 mg oral tablet, extended release  -- 1 tab(s) by mouth once a day  -- Indication: For Diabetes    Basaglar KwikPen 100 units/mL subcutaneous solution  -- 30 unit(s) subcutaneous once a day (at bedtime)  -- Indication: For Diabetes    pioglitazone  -- 30 milligram(s) by mouth once a day  -- Indication: For Diabetes    atorvastatin 80 mg oral tablet  -- 1 tab(s) by mouth once a day (at bedtime)  -- Indication: For Dyslipidemia    ezetimibe 10 mg oral tablet  -- 1 tab(s) by mouth once a day   -- Check with your doctor before becoming pregnant.  It is very important that you take or use this exactly as directed.  Do not skip doses or discontinue unless directed by your doctor.  Obtain medical advice before taking any non-prescription drugs as some may affect the action of this medication.    -- Indication: For Dyslipidemia    prasugrel 10 mg oral tablet  -- 1 tab(s) by mouth once a day MDD:1  -- Indication: For CAD    metoprolol succinate 50 mg oral tablet, extended release  -- 1 tab(s) by mouth once a day   -- It is very important that you take or use this exactly as directed.  Do not skip doses or discontinue unless directed by your doctor.  May cause drowsiness.  Alcohol may intensify this effect.  Use care when operating dangerous machinery.  Some non-prescription drugs may aggravate your condition.  Read all labels carefully.  If a warning appears, check with your doctor before taking.  Swallow whole.  Do not crush.  Take with food or milk.  This drug may impair the ability to drive or operate machinery.  Use care until you become familiar with its effects.    -- Indication: For CAD I will START or STAY ON the medications listed below when I get home from the hospital:    aspirin 81 mg oral tablet, chewable  -- 1 tab(s) by mouth once a day  -- Indication: For CAD    lisinopril 5 mg oral tablet  -- 1 tab(s) by mouth once a day  -- Indication: For CAD    Basaglar KwikPen 100 units/mL subcutaneous solution  -- 30 unit(s) subcutaneous once a day (at bedtime)  -- Indication: For Diabetes    pioglitazone  -- 30 milligram(s) by mouth once a day  -- Indication: For Diabetes    metFORMIN 1000 mg oral tablet, extended release  -- 1 tab(s) by mouth once a day  -- Indication: For Diabetes    ezetimibe 10 mg oral tablet  -- 1 tab(s) by mouth once a day   -- Check with your doctor before becoming pregnant.  It is very important that you take or use this exactly as directed.  Do not skip doses or discontinue unless directed by your doctor.  Obtain medical advice before taking any non-prescription drugs as some may affect the action of this medication.    -- Indication: For Dyslipidemia    Lipitor 40 mg oral tablet  -- 1 tab(s) by mouth once a day (at bedtime)   -- Avoid grapefruit and grapefruit juice while taking this medication.  Do not take this drug if you are pregnant.  It is very important that you take or use this exactly as directed.  Do not skip doses or discontinue unless directed by your doctor.  Obtain medical advice before taking any non-prescription drugs as some may affect the action of this medication.  Take with food or milk.    -- Indication: For Dyslipidemia    prasugrel 10 mg oral tablet  -- 1 tab(s) by mouth once a day MDD:1  -- Indication: For CAD    metoprolol succinate 50 mg oral tablet, extended release  -- 1 tab(s) by mouth once a day   -- It is very important that you take or use this exactly as directed.  Do not skip doses or discontinue unless directed by your doctor.  May cause drowsiness.  Alcohol may intensify this effect.  Use care when operating dangerous machinery.  Some non-prescription drugs may aggravate your condition.  Read all labels carefully.  If a warning appears, check with your doctor before taking.  Swallow whole.  Do not crush.  Take with food or milk.  This drug may impair the ability to drive or operate machinery.  Use care until you become familiar with its effects.    -- Indication: For CAD

## 2019-02-22 NOTE — DISCHARGE NOTE ADULT - PROVIDER TOKENS
PROVIDER:[TOKEN:[[id number]]],PROVIDER:[TOKEN:[[id number]]],PROVIDER:[TOKEN:[[id number]]]

## 2019-02-22 NOTE — DISCHARGE NOTE ADULT - CARE PLAN
Principal Discharge DX:	Stable angina pectoris  Goal:	Resolution of symptoms and continue medical therapy  Assessment and plan of treatment:	Your chest pain is likely caused by your underlying coronary artery diseases. You underwent cardiac catheterization yesterday and one stent was placed. You will need to continue to take the prescribed medication, especially aspirin and Effient, which are important to keep the stent open. Your disease is stable now and you do not exhibit any symptoms such as chest pain or shortness of breath on exertion. Maintain a low fat and low sugar diet. Please follow up with your primary care physician/Cardiologist routinely to monitor your lipid profile, blood pressure, and blood sugar.  Secondary Diagnosis:	Type 2 diabetes mellitus without complication, with long-term current use of insulin  Goal:	Continue medical management and follow up with Endocrinologist  Assessment and plan of treatment:	You currently have uncontrolled diabetes. You will need to continue your medication and maintain a low sugar diet. As per your Cardiologist Dr. Cervantes, you will need to hold Metformin for today and tomorrow. Please resume Metformin on 2/24. We also added a new medication Ezetimibe 10mg once daily. Please continue to follow up with your Endocrinologist for further medical care, such as routine ophthalmologist and podiatrist visit. You will also need to do routine foot check to avoid any unsuspected wounds.

## 2019-02-22 NOTE — PROGRESS NOTE ADULT - SUBJECTIVE AND OBJECTIVE BOX
Cardiology Follow up    MANN ZAFAR   48yMale  PAST MEDICAL & SURGICAL HISTORY:  Heart failure  Coronary artery disease  Diabetes mellitus  No significant past surgical history    Allergies    No Known Allergies    Intolerances        Patient without complaints. Pt ambulated without issues/symptoms  Denies CP, SOB, palpitations, or dizziness  No events on telemetry overnight    Vital Signs Last 24 Hrs  T(C): 35.9 (22 Feb 2019 05:40), Max: 36.8 (21 Feb 2019 19:15)  T(F): 96.7 (22 Feb 2019 05:40), Max: 98.2 (21 Feb 2019 19:15)  HR: 81 (22 Feb 2019 05:40) (66 - 84)  BP: 114/62 (22 Feb 2019 05:40) (106/55 - 133/66)  BP(mean): --  RR: 17 (22 Feb 2019 05:40) (17 - 20)  SpO2: 100% (21 Feb 2019 15:03) (99% - 100%)Allergies    REVIEW OF SYSTEMS:    CONSTITUTIONAL: No weakness, fevers or chills  EYES/ENT: No visual changes;  No vertigo or throat pain   NECK: No pain or stiffness  RESPIRATORY: No cough, wheezing, hemoptysis; No shortness of breath  CARDIOVASCULAR: No chest pain or palpitations  GASTROINTESTINAL: No abdominal or epigastric pain. No nausea, vomiting, or hematemesis; No diarrhea or constipation. No melena or hematochezia.  GENITOURINARY: No dysuria, frequency or hematuria  NEUROLOGICAL: No numbness or weakness  SKIN: No itching, rashes        NAD, appears well  S1S2, no murmurs, no JVD  CTA B/L, no wheeze, no rales  SNT +BS  Ext:  Right Radial : NO   hematoma or   bleeding, C/D/I , + pulses, mvmt, sensation, warmth, + refill      Pulses:  +Rad/ +PTs /+DPs/ same as baseline  A&Ox 3    EKG    P                                                                                                                 2D ECHO   EXAM:  2-D ECHO (TTE) COMPLETE        PROCEDURE DATE:  02/22/2019      INTERPRETATION:  REPORT:    TRANSTHORACIC ECHOCARDIOGRAM REPORT         Patient Name:   MANN ZAFAR Accession #: [id number]  Medical Rec #:  [id number]     Height:      65.0 in 165.1 cm  YOB: 1970      Weight:      160.0 lb 72.57 kg  Patient Age:    48 years      BSA:         1.80 m²  Patient Gender: M             BP:          114/62 mmHg       Date of Exam:        2/22/2019 8:01:02 AM  Referring Physician: [id number] TREE ALONZO  Sonographer:         Ben Perkins  Fellow:              Cecilia Christian Daneshvar, Farshid, ,  Reading Physician:   Rey Germain M.D.    Procedure:   2D Echo/Doppler/Color Doppler Complete.  Indications: R07.9 - Chest Pain, unspecified  Diagnosis:   R07.9 - Chest Pain, unspecified         Summary:   1. Normal global left ventricular systolic function.   2. Mildly increased LV wall thickness.   3. Trace tricuspid regurgitation.   4. Pulmonic valve regurgitation.    PHYSICIAN INTERPRETATION:  Left Ventricle: The left ventricular internal cavity size is normal. Left   ventricular wall thickness is mildly increased. Global LV systolic   function was normal.  Right Ventricle: Normal right ventricular size and function.  Left Atrium: Normal left atrial size.  Right Atrium: Normal right atrial size.  Pericardium: There is no evidence of pericardial effusion.  Mitral Valve: Mild mitral valve regurgitation is seen. The mitral valve   is normal in structure.  Tricuspid Valve: Trivial tricuspid regurgitation is visualized. The   tricuspid valve is normal.  Aortic Valve: No evidence of aortic valve regurgitation is seen. The   aortic valve is trileaflet. No evidence of aortic stenosis.  Pulmonic Valve: Mild pulmonic valve regurgitation.  Aorta: The aortic root is normal in size and structure.       2D AND M-MODE MEASUREMENTS (normal ranges within parentheses):  Left                 Normal    Aorta/Left           Normal  Ventricle:                     Atrium:  IVSd (2D): 1.39 cm   (0.7-1.1) AoV Cusp      1.77   (1.5-2.6)  LVPWd      1.56 cm   (0.7-1.1) Separation:   cm  (2D):                          Left Atrium   3.55   (1.9-4.0)  LVIDd      3.23 cm   (3.4-5.7) (Mmode):      cm  (2D):                          LA Volume 19.0  LVIDs      2.19 cm             Index         ml/m²  (2D):                          Right  LV FS      32.1 %    (>25%)    Ventricle:  (2D):                          RVd (2D):      3.03 cm  IVSd       1.26 cm   (0.7-1.1)  (Mmode):  LVPWd      1.18 cm   (0.7-1.1)  (Mmode):  LVIDd      3.88 cm   (3.4-5.7)  (Mmode):  LVIDs      2.63 cm  (Mmode):  LV FS      32.4 %    (>25%)  (Mmode):  Relative   0.97      (<0.42)  Wall  Thickness  Rel. Wall  0.61      (<0.42)  Thickness  Mm  LV Mass    90.2 g/m²  Index:  Mmode    SPECTRAL DOPPLER ANALYSIS:  LV DIASTOLIC FUNCTION:  MV Peak E: 0.92 m/s Decel Time: 178 msec  MV Peak A: 0.81 m/s  E/A Ratio: 1.13    Aortic Valve:  AoV VMax:    1.00 m/s AoV Area, Vmax: 2.18 cm² Vmax Indx: 1.21 cm²/m²  AoV Pk Grad: 4.0 mmHg    LVOT Vmax: 0.81 m/s  LVOT VTI:  0.18 m  LVOT Diam: 1.85 cm    Mitral Valve:  MV P1/2 Time: 51.56 msec  MV Area, PHT: 4.27 cm²    Tricuspid Valve and PA/RV Systolic Pressure: TR Max Velocity: 2.36 m/s RA   Pressure:  RVSP/PASP: 15.0 mmHg    Pulmonic Valve:  PV Max Velocity: 0.86 m/s PV Max PG: 3.0 mmHg PV Mean PG:       [id number] Rey Germain M.D., Electronically signed on 2/22/2019 at 8:58:52   AM              *** Final ***                    REY GERMAIN MD  This document has been electronically signed. Feb 22 2019  8:01AM        · Note Type	cath report	      PRE-OP DIAGNOSIS: AMI     PROCEDURE: lhc/sca    Physician: Billy  Assistant: Alfonso    ANESTHESIA TYPE:  [  ]General Anesthesia  [ x ] Sedation  [ x ] Local/Regional    ESTIMATED BLOOD LOSS:   10    mL    CONDITION  [  ] Critical  [  ] Serious  [ x ]Fair  [  ]Good      SPECIMENS REMOVED (IF APPLICABLE):      IV CONTRAST:      125       mL      IMPLANTS (IF APPLICABLE)  stent x1    FINDINGS    Left Heart Catheterization:  LVEF%:  LVEDP: 16  [ ] Normal Coronary Arteries  [ ] Luminal Irregularities  [x ] 1v CAD        INTERVENTION: MLAD 95-0  IMPLANTS: DAWSON          POST-OP DIAGNOSIS    cad      PLAN OF CARE  [ ] D/C Home today  [ ]  D/C in AM  [x ] Return to In-patient bed  [x ] Admit to CCU  [ ] Return for staged procedure:  [ ] CT Surgery consult called  [ x]  Continue DAPT, B-blocker & Statin therapy.      Electronic Signatures:  Kodak Cervantes)  (Signed 14-Sep-2018 10:22)  	Authored: Note Type, Note      Last Updated: 14-Sep-2018 10:22 by Kodak Cervantes)      LABS                        14.7   7.11  )-----------( 186      ( 22 Feb 2019 07:20 )             41.8     02-22    138  |  104  |  12  ----------------------------<  218<H>  4.1   |  25  |  0.9    Ca    9.0      22 Feb 2019 07:20    TPro  5.9<L>  /  Alb  4.0  /  TBili  0.5  /  DBili  x   /  AST  13  /  ALT  17  /  AlkPhos  74  02-21    CARDIAC MARKERS ( 22 Feb 2019 07:20 )  x     / 0.02 ng/mL / 65 U/L / x     / 2.7 ng/mL  CARDIAC MARKERS ( 21 Feb 2019 12:56 )  x     / <0.01 ng/mL / x     / x     / x          LIVER FUNCTIONS - ( 21 Feb 2019 12:56 )  Alb: 4.0 g/dL / Pro: 5.9 g/dL / ALK PHOS: 74 U/L / ALT: 17 U/L / AST: 13 U/L / GGT: x             A/P:  I discussed the case with Interventional Cardiologist Dr. Cervantes & recommends the following:    S/P PCI LAD  	         Continue DAPT,(asa 81mg daily, effient 10mg daily,   B-Blocker, Statin Therapy, ace                    decrease lipitor to 40mg qhs per Dr. Cervantes                   o-, ambulate                    hold metformin x 48 hrs post cath                    Pt given instructions on importance of taking antiplatelet medication or risk acute stent thrombosis/death                   Post cath instructions, access site care and activity restrictions reviewed with patient                     Discussed with patient to return to hospital if experience chest pain, shortness breath, dizziness and site bleeding                   Aggressive risk factor modification,  diet counseling, smoking cessation discussed with patient                       Can discharge patient from cardiac standpoint if ambulating with out symptoms as discussed with Dr. Cervantes                    Follow up with Cardiology Dr. Cervantes  in one week.  Instructed to call and make an appointment Cardiology Follow up    MANN ZAFAR   48yMale  PAST MEDICAL & SURGICAL HISTORY:  Heart failure  Coronary artery disease  Diabetes mellitus  No significant past surgical history    Allergies    No Known Allergies    Intolerances        Patient without complaints. Pt ambulated without issues/symptoms  Denies CP, SOB, palpitations, or dizziness  No events on telemetry overnight    Vital Signs Last 24 Hrs  T(C): 35.9 (22 Feb 2019 05:40), Max: 36.8 (21 Feb 2019 19:15)  T(F): 96.7 (22 Feb 2019 05:40), Max: 98.2 (21 Feb 2019 19:15)  HR: 81 (22 Feb 2019 05:40) (66 - 84)  BP: 114/62 (22 Feb 2019 05:40) (106/55 - 133/66)  BP(mean): --  RR: 17 (22 Feb 2019 05:40) (17 - 20)  SpO2: 100% (21 Feb 2019 15:03) (99% - 100%)Allergies    REVIEW OF SYSTEMS:    CONSTITUTIONAL: No weakness, fevers or chills  EYES/ENT: No visual changes;  No vertigo or throat pain   NECK: No pain or stiffness  RESPIRATORY: No cough, wheezing, hemoptysis; No shortness of breath  CARDIOVASCULAR: No chest pain or palpitations  GASTROINTESTINAL: No abdominal or epigastric pain. No nausea, vomiting, or hematemesis; No diarrhea or constipation. No melena or hematochezia.  GENITOURINARY: No dysuria, frequency or hematuria  NEUROLOGICAL: No numbness or weakness  SKIN: No itching, rashes        NAD, appears well  S1S2, no murmurs, no JVD  CTA B/L, no wheeze, no rales  SNT +BS  Ext:  Right Radial : NO   hematoma or   bleeding, C/D/I , + pulses, mvmt, sensation, warmth, + refill      Pulses:  +Rad/ +PTs /+DPs/ same as baseline  A&Ox 3    EKG    P                                                                                                                 2D ECHO   EXAM:  2-D ECHO (TTE) COMPLETE        PROCEDURE DATE:  02/22/2019      INTERPRETATION:  REPORT:    TRANSTHORACIC ECHOCARDIOGRAM REPORT         Patient Name:   MANN ZAFAR Accession #: [id number]  Medical Rec #:  [id number]     Height:      65.0 in 165.1 cm  YOB: 1970      Weight:      160.0 lb 72.57 kg  Patient Age:    48 years      BSA:         1.80 m²  Patient Gender: M             BP:          114/62 mmHg       Date of Exam:        2/22/2019 8:01:02 AM  Referring Physician: [id number] TREE ALONZO  Sonographer:         Ben Perkins  Fellow:              Cecilia Christian Daneshvar, Farshid, ,  Reading Physician:   Rey Germain M.D.    Procedure:   2D Echo/Doppler/Color Doppler Complete.  Indications: R07.9 - Chest Pain, unspecified  Diagnosis:   R07.9 - Chest Pain, unspecified         Summary:   1. Normal global left ventricular systolic function.   2. Mildly increased LV wall thickness.   3. Trace tricuspid regurgitation.   4. Pulmonic valve regurgitation.    PHYSICIAN INTERPRETATION:  Left Ventricle: The left ventricular internal cavity size is normal. Left   ventricular wall thickness is mildly increased. Global LV systolic   function was normal.  Right Ventricle: Normal right ventricular size and function.  Left Atrium: Normal left atrial size.  Right Atrium: Normal right atrial size.  Pericardium: There is no evidence of pericardial effusion.  Mitral Valve: Mild mitral valve regurgitation is seen. The mitral valve   is normal in structure.  Tricuspid Valve: Trivial tricuspid regurgitation is visualized. The   tricuspid valve is normal.  Aortic Valve: No evidence of aortic valve regurgitation is seen. The   aortic valve is trileaflet. No evidence of aortic stenosis.  Pulmonic Valve: Mild pulmonic valve regurgitation.  Aorta: The aortic root is normal in size and structure.       2D AND M-MODE MEASUREMENTS (normal ranges within parentheses):  Left                 Normal    Aorta/Left           Normal  Ventricle:                     Atrium:  IVSd (2D): 1.39 cm   (0.7-1.1) AoV Cusp      1.77   (1.5-2.6)  LVPWd      1.56 cm   (0.7-1.1) Separation:   cm  (2D):                          Left Atrium   3.55   (1.9-4.0)  LVIDd      3.23 cm   (3.4-5.7) (Mmode):      cm  (2D):                          LA Volume 19.0  LVIDs      2.19 cm             Index         ml/m²  (2D):                          Right  LV FS      32.1 %    (>25%)    Ventricle:  (2D):                          RVd (2D):      3.03 cm  IVSd       1.26 cm   (0.7-1.1)  (Mmode):  LVPWd      1.18 cm   (0.7-1.1)  (Mmode):  LVIDd      3.88 cm   (3.4-5.7)  (Mmode):  LVIDs      2.63 cm  (Mmode):  LV FS      32.4 %    (>25%)  (Mmode):  Relative   0.97      (<0.42)  Wall  Thickness  Rel. Wall  0.61      (<0.42)  Thickness  Mm  LV Mass    90.2 g/m²  Index:  Mmode    SPECTRAL DOPPLER ANALYSIS:  LV DIASTOLIC FUNCTION:  MV Peak E: 0.92 m/s Decel Time: 178 msec  MV Peak A: 0.81 m/s  E/A Ratio: 1.13    Aortic Valve:  AoV VMax:    1.00 m/s AoV Area, Vmax: 2.18 cm² Vmax Indx: 1.21 cm²/m²  AoV Pk Grad: 4.0 mmHg    LVOT Vmax: 0.81 m/s  LVOT VTI:  0.18 m  LVOT Diam: 1.85 cm    Mitral Valve:  MV P1/2 Time: 51.56 msec  MV Area, PHT: 4.27 cm²    Tricuspid Valve and PA/RV Systolic Pressure: TR Max Velocity: 2.36 m/s RA   Pressure:  RVSP/PASP: 15.0 mmHg    Pulmonic Valve:  PV Max Velocity: 0.86 m/s PV Max PG: 3.0 mmHg PV Mean PG:       [id number] Rey Germain M.D., Electronically signed on 2/22/2019 at 8:58:52   AM              *** Final ***                    REY GERMAIN MD  This document has been electronically signed. Feb 22 2019  8:01AM        · Note Type	cath report	      PRE-OP DIAGNOSIS: AMI     PROCEDURE: lhc/sca    Physician: Billy  Assistant: Alfonso    ANESTHESIA TYPE:  [  ]General Anesthesia  [ x ] Sedation  [ x ] Local/Regional    ESTIMATED BLOOD LOSS:   10    mL    CONDITION  [  ] Critical  [  ] Serious  [ x ]Fair  [  ]Good      SPECIMENS REMOVED (IF APPLICABLE):      IV CONTRAST:      125       mL      IMPLANTS (IF APPLICABLE)  stent x1    FINDINGS    Left Heart Catheterization:  LVEF%:  LVEDP: 16  [ ] Normal Coronary Arteries  [ ] Luminal Irregularities  [x ] 1v CAD        INTERVENTION: MLAD 95-0  IMPLANTS: DAWSON          POST-OP DIAGNOSIS    cad      PLAN OF CARE  [ ] D/C Home today  [ ]  D/C in AM  [x ] Return to In-patient bed  [x ] Admit to CCU  [ ] Return for staged procedure:  [ ] CT Surgery consult called  [ x]  Continue DAPT, B-blocker & Statin therapy.      Electronic Signatures:  Kodak Cervantes)  (Signed 14-Sep-2018 10:22)  	Authored: Note Type, Note      Last Updated: 14-Sep-2018 10:22 by Kodak Cervantes)      LABS                        14.7   7.11  )-----------( 186      ( 22 Feb 2019 07:20 )             41.8     02-22    138  |  104  |  12  ----------------------------<  218<H>  4.1   |  25  |  0.9    Ca    9.0      22 Feb 2019 07:20    TPro  5.9<L>  /  Alb  4.0  /  TBili  0.5  /  DBili  x   /  AST  13  /  ALT  17  /  AlkPhos  74  02-21    CARDIAC MARKERS ( 22 Feb 2019 07:20 )  x     / 0.02 ng/mL / 65 U/L / x     / 2.7 ng/mL  CARDIAC MARKERS ( 21 Feb 2019 12:56 )  x     / <0.01 ng/mL / x     / x     / x          LIVER FUNCTIONS - ( 21 Feb 2019 12:56 )  Alb: 4.0 g/dL / Pro: 5.9 g/dL / ALK PHOS: 74 U/L / ALT: 17 U/L / AST: 13 U/L / GGT: x             A/P:  I discussed the case with Interventional Cardiologist Dr. Cervantes & recommends the following:    S/P PCI LAD  	         Continue DAPT,(asa 81mg daily, effient 10mg daily,   B-Blocker, Statin Therapy, ace                    decrease lipitor to 40mg qhs per Dr. Cervantes                   o-, ambulate                    hold metformin x 48 hrs post cath , f/u HGB a1c and endocrine recommendations                    Pt given instructions on importance of taking antiplatelet medication or risk acute stent thrombosis/death                   Post cath instructions, access site care and activity restrictions reviewed with patient                     Discussed with patient to return to hospital if experience chest pain, shortness breath, dizziness and site bleeding                   Aggressive risk factor modification,  diet counseling, smoking cessation discussed with patient                       Can discharge patient from cardiac standpoint if ambulating with out symptoms as discussed with Dr. Cervantes                    Follow up with Cardiology Dr. Cervantes  in one week.  Instructed to call and make an appointment

## 2019-02-22 NOTE — CONSULT NOTE ADULT - ASSESSMENT
patient has severe coronary artery disease, and hypercholesterolemia, as well as uncontrolled type 2 diabetes, patient has been particularly self-neglectful, and totally non-compliant with all endocrine visits, and does not appreciate that medications for type 2 diabetes can actually prevent coronary atrey disease.
# CAD s/p PCI in past  # U.angina  # DM  # HLD  # HTN    - f/u serial troponins q 4-6 h  - f/u TTE to assess LVEF and WM abnormalities  - keep NPO for cardiac angiogram   - f/u fasting lipid panel and HbA1c to assess ASCVD risk  - c/w home meds

## 2019-02-26 DIAGNOSIS — I25.110 ATHEROSCLEROTIC HEART DISEASE OF NATIVE CORONARY ARTERY WITH UNSTABLE ANGINA PECTORIS: ICD-10-CM

## 2019-02-26 DIAGNOSIS — Z95.5 PRESENCE OF CORONARY ANGIOPLASTY IMPLANT AND GRAFT: ICD-10-CM

## 2019-02-26 DIAGNOSIS — I25.2 OLD MYOCARDIAL INFARCTION: ICD-10-CM

## 2019-02-26 DIAGNOSIS — E11.65 TYPE 2 DIABETES MELLITUS WITH HYPERGLYCEMIA: ICD-10-CM

## 2019-02-26 DIAGNOSIS — K21.9 GASTRO-ESOPHAGEAL REFLUX DISEASE WITHOUT ESOPHAGITIS: ICD-10-CM

## 2019-02-26 DIAGNOSIS — Z79.4 LONG TERM (CURRENT) USE OF INSULIN: ICD-10-CM

## 2019-02-26 DIAGNOSIS — E78.5 HYPERLIPIDEMIA, UNSPECIFIED: ICD-10-CM

## 2019-02-26 DIAGNOSIS — Z91.19 PATIENT'S NONCOMPLIANCE WITH OTHER MEDICAL TREATMENT AND REGIMEN: ICD-10-CM

## 2019-02-26 DIAGNOSIS — Z79.82 LONG TERM (CURRENT) USE OF ASPIRIN: ICD-10-CM

## 2019-02-26 PROBLEM — I50.9 HEART FAILURE, UNSPECIFIED: Chronic | Status: ACTIVE | Noted: 2019-02-21

## 2019-02-26 PROBLEM — I25.10 ATHEROSCLEROTIC HEART DISEASE OF NATIVE CORONARY ARTERY WITHOUT ANGINA PECTORIS: Chronic | Status: ACTIVE | Noted: 2019-02-21

## 2019-02-27 ENCOUNTER — APPOINTMENT (OUTPATIENT)
Dept: ENDOCRINOLOGY | Facility: CLINIC | Age: 49
End: 2019-02-27

## 2019-02-28 ENCOUNTER — OUTPATIENT (OUTPATIENT)
Dept: OUTPATIENT SERVICES | Facility: HOSPITAL | Age: 49
LOS: 1 days | Discharge: HOME | End: 2019-02-28

## 2019-02-28 ENCOUNTER — APPOINTMENT (OUTPATIENT)
Dept: ENDOCRINOLOGY | Facility: CLINIC | Age: 49
End: 2019-02-28

## 2019-02-28 VITALS — DIASTOLIC BLOOD PRESSURE: 77 MMHG | SYSTOLIC BLOOD PRESSURE: 121 MMHG | HEART RATE: 82 BPM | HEIGHT: 65 IN

## 2019-02-28 NOTE — HISTORY OF PRESENT ILLNESS
[FreeTextEntry1] : 49 y/o M w/ PHMx of Type 2 diabetes presents after recent hospital discharge for chest pain and stent placement. During hospitalization his HbA1c was 10.3. He has been diabetic for roughly 15 years. Currently he is taking Admalog solostar 4unit before every meal, basaglar 35 units daily, metformin 1000mg daily, Pioglitazone 30mg. He reports that since discharge his home blood glucose has been around 100 typically and he has just started making a dairy.

## 2019-02-28 NOTE — PHYSICAL EXAM
[Alert] : alert [No Acute Distress] : no acute distress [Well Nourished] : well nourished [Well Developed] : well developed [Normal Sclera/Conjunctiva] : normal sclera/conjunctiva [EOMI] : extra ocular movement intact [No Proptosis] : no proptosis [Normal Oropharynx] : the oropharynx was normal [Thyroid Not Enlarged] : the thyroid was not enlarged [No Thyroid Nodules] : there were no palpable thyroid nodules [No Respiratory Distress] : no respiratory distress [No Accessory Muscle Use] : no accessory muscle use [Clear to Auscultation] : lungs were clear to auscultation bilaterally [Normal Rate] : heart rate was normal  [Normal S1, S2] : normal S1 and S2 [Regular Rhythm] : with a regular rhythm [Pedal Pulses Normal] : the pedal pulses are present [No Edema] : there was no peripheral edema [Normal Bowel Sounds] : normal bowel sounds [Not Tender] : non-tender [Soft] : abdomen soft [Not Distended] : not distended [Post Cervical Nodes] : posterior cervical nodes [Anterior Cervical Nodes] : anterior cervical nodes [Axillary Nodes] : axillary nodes [Normal] : normal and non tender [No Spinal Tenderness] : no spinal tenderness [Spine Straight] : spine straight [No Stigmata of Cushings Syndrome] : no stigmata of cushings syndrome [Normal Gait] : normal gait [Normal Strength/Tone] : muscle strength and tone were normal [No Rash] : no rash [Normal Reflexes] : deep tendon reflexes were 2+ and symmetric [No Tremors] : no tremors [Oriented x3] : oriented to person, place, and time [Acanthosis Nigricans] : no acanthosis nigricans [de-identified] : healing skin lesion 5mm left mid shin from trauma - no signs of infection

## 2019-02-28 NOTE — REVIEW OF SYSTEMS
[Shortness Of Breath] : shortness of breath [Negative] : Heme/Lymph [FreeTextEntry6] : endorses SOB on exertion

## 2019-02-28 NOTE — ASSESSMENT
[Carbohydrate Consistent Diet] : carbohydrate consistent diet [Hypoglycemia Management] : hypoglycemia management [Diabetes Foot Care] : diabetes foot care [Long Term Vascular Complications] : long term vascular complications of diabetes [Importance of Diet and Exercise] : importance of diet and exercise to improve glycemic control, achieve weight loss and improve cardiovascular health [FreeTextEntry1] : uncontrolled type 2 diabetes, try trulicity 0.75 mg. weekly, and steglatro 15 mg. weekly, stop admelog.stop famotidine.

## 2019-03-01 ENCOUNTER — APPOINTMENT (OUTPATIENT)
Dept: CARDIOLOGY | Facility: CLINIC | Age: 49
End: 2019-03-01

## 2019-03-01 VITALS
WEIGHT: 172 LBS | HEIGHT: 65 IN | HEART RATE: 76 BPM | SYSTOLIC BLOOD PRESSURE: 100 MMHG | BODY MASS INDEX: 28.66 KG/M2 | DIASTOLIC BLOOD PRESSURE: 62 MMHG

## 2019-03-01 PROCEDURE — G9001: CPT

## 2019-03-01 RX ORDER — LISINOPRIL 5 MG/1
5 TABLET ORAL
Qty: 90 | Refills: 3 | Status: DISCONTINUED | COMMUNITY
Start: 1900-01-01 | End: 2019-03-01

## 2019-03-01 RX ORDER — ATORVASTATIN CALCIUM 40 MG/1
40 TABLET, FILM COATED ORAL
Qty: 30 | Refills: 0 | Status: DISCONTINUED | COMMUNITY
Start: 2019-02-22 | End: 2019-03-01

## 2019-03-01 NOTE — ASSESSMENT
[FreeTextEntry1] : 47 yo male with pmhx and presentation as above\par cad/ami/pci of lad\par icm, htn, dm\par cont meds\par change acei to vasotec\par change lipitor to pravachol\par set up for another cath/pci\par add CoQ10\par aggressive risk modif\par diet and act as tolerated\par rtc post cath

## 2019-03-01 NOTE — HISTORY OF PRESENT ILLNESS
[FreeTextEntry1] : 47 yo male with pmhx as below is here for a f/up visit\par 9/18 admitted to [facility] with ami\par s/p pci of lad with vivien\par another admission to [facility] with ua, s/p cath - 2v cad, s/p pci of lad\par recurrent cp, fatigue, valenzuela\par no other cvs complains\par ongoing myalgia\par complaint with meds and diet

## 2019-03-08 ENCOUNTER — OUTPATIENT (OUTPATIENT)
Dept: OUTPATIENT SERVICES | Facility: HOSPITAL | Age: 49
LOS: 1 days | Discharge: HOME | End: 2019-03-08

## 2019-03-08 ENCOUNTER — TRANSCRIPTION ENCOUNTER (OUTPATIENT)
Age: 49
End: 2019-03-08

## 2019-03-08 ENCOUNTER — APPOINTMENT (OUTPATIENT)
Dept: PODIATRY | Facility: CLINIC | Age: 49
End: 2019-03-08
Payer: MEDICAID

## 2019-03-08 VITALS
WEIGHT: 172 LBS | DIASTOLIC BLOOD PRESSURE: 70 MMHG | SYSTOLIC BLOOD PRESSURE: 107 MMHG | HEART RATE: 82 BPM | HEIGHT: 65 IN | BODY MASS INDEX: 28.66 KG/M2

## 2019-03-08 PROCEDURE — 99203 OFFICE O/P NEW LOW 30 MIN: CPT

## 2019-03-08 NOTE — PHYSICAL EXAM
[General Appearance - Alert] : alert [General Appearance - In No Acute Distress] : in no acute distress [Sclera] : the sclera and conjunctiva were normal [PERRL With Normal Accommodation] : pupils were equal in size, round, and reactive to light [Right Foot Was Examined] : The right foot was examined [Left Foot Was Examined] : The left foot was examined [Normal Appearance] : normal in appearance [Normal in Appearance] : normal in appearance [Normal] : normal [2+] : 2+ in the dorsalis pedis [Vibration Dec.] : diminished vibratory sensation at the level of the toes [Oriented To Time, Place, And Person] : oriented to person, place, and time [FreeTextEntry1] : pedal pulses palpable  [Erythema] : not erythematous [Delayed in the Right Toes] : normal in the toes [Swelling] : not swollen [Delayed in the Left Toes] : normal in the toes [Position Sense Dec.] : normal position sense at the level of the toes [Diminished Throughout Right Foot] : normal tactile sensation with monofilament testing throughout right foot [Diminished Throughout Left Foot] : normal tactile sensation with monofilament testing throughout left foot

## 2019-03-08 NOTE — END OF VISIT
[] : Resident [FreeTextEntry3] : -Diabetic neurovascular foot assessment  performed. Decreased vibratory sensation\par -Discussed with patient diabetic foot hygiene.Patient instructed to regularly check the bottom of the feet\par -Patient given a diabetic foot education sheet\par -Return 6 month\par

## 2019-03-08 NOTE — PROCEDURE
[FreeTextEntry1] : pt seen/evaluated @ bedside w/ attending \par LE focused exam performed\par referral for RAMANA and PVR's due to recent changes in skin texture and pedal hair loss\par RTC 6 months unless findings of vascular testing is abnormal, then pt will be contacted by clinic for more immediate evaluation\par plan discussed w/ pt

## 2019-03-15 DIAGNOSIS — E11.40 TYPE 2 DIABETES MELLITUS WITH DIABETIC NEUROPATHY, UNSPECIFIED: ICD-10-CM

## 2019-03-15 DIAGNOSIS — E11.9 TYPE 2 DIABETES MELLITUS WITHOUT COMPLICATIONS: ICD-10-CM

## 2019-03-29 ENCOUNTER — RX RENEWAL (OUTPATIENT)
Age: 49
End: 2019-03-29

## 2019-04-19 ENCOUNTER — APPOINTMENT (OUTPATIENT)
Dept: CARDIOLOGY | Facility: CLINIC | Age: 49
End: 2019-04-19
Payer: MEDICAID

## 2019-04-19 VITALS
DIASTOLIC BLOOD PRESSURE: 62 MMHG | SYSTOLIC BLOOD PRESSURE: 92 MMHG | HEART RATE: 92 BPM | BODY MASS INDEX: 26.99 KG/M2 | HEIGHT: 65 IN | WEIGHT: 162 LBS

## 2019-04-19 PROCEDURE — 93000 ELECTROCARDIOGRAM COMPLETE: CPT

## 2019-04-19 PROCEDURE — 99214 OFFICE O/P EST MOD 30 MIN: CPT

## 2019-04-19 RX ORDER — INSULIN GLARGINE 100 [IU]/ML
100 INJECTION, SOLUTION SUBCUTANEOUS
Refills: 0 | Status: DISCONTINUED | COMMUNITY
End: 2019-04-19

## 2019-04-19 NOTE — REVIEW OF SYSTEMS
[Feeling Fatigued] : feeling fatigued [Joint Pain] : joint pain [Tingling (Paresthesia)] : tingling [see HPI] : see HPI [Negative] : Endocrine

## 2019-04-19 NOTE — PHYSICAL EXAM
[General Appearance - Well Developed] : well developed [Well Groomed] : well groomed [Normal Appearance] : normal appearance [General Appearance - Well Nourished] : well nourished [No Deformities] : no deformities [Normal Oral Mucosa] : normal oral mucosa [General Appearance - In No Acute Distress] : no acute distress [No Oral Pallor] : no oral pallor [No Oral Cyanosis] : no oral cyanosis [Normal Jugular Venous A Waves Present] : normal jugular venous A waves present [Normal Jugular Venous V Waves Present] : normal jugular venous V waves present [No Jugular Venous Finley A Waves] : no jugular venous finley A waves [Respiration, Rhythm And Depth] : normal respiratory rhythm and effort [Exaggerated Use Of Accessory Muscles For Inspiration] : no accessory muscle use [Auscultation Breath Sounds / Voice Sounds] : lungs were clear to auscultation bilaterally [Heart Rate And Rhythm] : heart rate and rhythm were normal [Murmurs] : no murmurs present [Heart Sounds] : normal S1 and S2 [Abdomen Soft] : soft [Abdomen Tenderness] : non-tender [Abdomen Mass (___ Cm)] : no abdominal mass palpated [Nail Clubbing] : no clubbing of the fingernails [Cyanosis, Localized] : no localized cyanosis [Petechial Hemorrhages (___cm)] : no petechial hemorrhages [] : no ischemic changes [Skin Color & Pigmentation] : normal skin color and pigmentation

## 2019-04-19 NOTE — HISTORY OF PRESENT ILLNESS
[FreeTextEntry1] : 47 yo male with pmhx as below is here for a f/up visit\par 9/18 admitted to [facility] with ami\par s/p pci of lad with vivien\par another admission to [facility] with ua, s/p cath - 2v cad, s/p pci of lad and a staged pci of lcx at [facility] last month\par no cp, fatigue, vlaenzuela\par no other cvs complains\par tolerating pravachol\par et is much better\par complaint with meds and diet

## 2019-04-19 NOTE — ASSESSMENT
[FreeTextEntry1] : 49 yo male with pmhx and presentation as above\par cad/ami/pci of lad\par icm, htn, dm\par cont meds\par [facility] records reviewed, \par aggressive risk modif\par diet and act as tolerated\par rtc 3-4 months, labs 1 week prior

## 2019-05-23 ENCOUNTER — APPOINTMENT (OUTPATIENT)
Dept: ENDOCRINOLOGY | Facility: CLINIC | Age: 49
End: 2019-05-23

## 2019-05-23 ENCOUNTER — OUTPATIENT (OUTPATIENT)
Dept: OUTPATIENT SERVICES | Facility: HOSPITAL | Age: 49
LOS: 1 days | Discharge: HOME | End: 2019-05-23

## 2019-05-23 VITALS
SYSTOLIC BLOOD PRESSURE: 114 MMHG | HEART RATE: 92 BPM | HEIGHT: 65 IN | WEIGHT: 170 LBS | BODY MASS INDEX: 28.32 KG/M2 | DIASTOLIC BLOOD PRESSURE: 80 MMHG

## 2019-05-23 NOTE — HISTORY OF PRESENT ILLNESS
[FreeTextEntry1] : 49 yr M with hx of D.M,CAD s/p PCI *2 in 3/19,HTN ,DLD .\par today is here for follow up after 2/19 .\par FS is better controlled at home ,mostly 100-150 fasting and post prandial .\par today complaint of numbness and tingling of feet and hands.

## 2019-05-23 NOTE — PHYSICAL EXAM
[Alert] : alert [Well Nourished] : well nourished [Supple] : the neck was supple [No Respiratory Distress] : no respiratory distress [No Accessory Muscle Use] : no accessory muscle use [Clear to Auscultation] : lungs were clear to auscultation bilaterally [Normal S1, S2] : normal S1 and S2 [No Edema] : there was no peripheral edema [Not Tender] : non-tender [Soft] : abdomen soft [Not Distended] : not distended [No CVA Tenderness] : no ~M costovertebral angle tenderness [No Motor Deficits] : the motor exam was normal [Oriented x3] : oriented to person, place, and time

## 2019-05-23 NOTE — ASSESSMENT
[Carbohydrate Consistent Diet] : carbohydrate consistent diet [Diabetes Foot Care] : diabetes foot care [Long Term Vascular Complications] : long term vascular complications of diabetes [Importance of Diet and Exercise] : importance of diet and exercise to improve glycemic control, achieve weight loss and improve cardiovascular health [FreeTextEntry1] : 49 yr M with hx of D.M,CAD s/p PCI *2 in 3/19,HTN ,DLD is here for follow up.Toady he has complaint of tingling.\par \par #D.M-2 with neuropathy ;\par last HgA1c 10.3 \par taking trulicity 1.5 daily ,metformin 1000 daily ,steglatro 15 daily and pioglitazone 30 daily \par \par PLAN ;\par started on Gabapentin 100 q8 hr daily for neuropathy . add ezetimibe 10 mg. daily.\par \par

## 2019-06-01 RX ORDER — ERTUGLIFLOZIN AND METFORMIN HYDROCHLORIDE 7.5; 1 MG/1; MG/1
7.5-1 TABLET, FILM COATED ORAL
Qty: 60 | Refills: 5 | Status: DISCONTINUED | COMMUNITY
Start: 2019-05-23 | End: 2019-06-01

## 2019-06-24 NOTE — CHART NOTE - NSCHARTNOTEFT_GEN_A_CORE
PRE-OP DIAGNOSIS: AMI     PROCEDURE: lhc/sca    Physician: Billy  Assistant: Alfonso    ANESTHESIA TYPE:  [  ]General Anesthesia  [ x ] Sedation  [ x ] Local/Regional    ESTIMATED BLOOD LOSS:   10    mL    CONDITION  [  ] Critical  [  ] Serious  [ x ]Fair  [  ]Good      SPECIMENS REMOVED (IF APPLICABLE):      IV CONTRAST:      125       mL      IMPLANTS (IF APPLICABLE)  stent x1    FINDINGS    Left Heart Catheterization:  LVEF%:  LVEDP: 16  [ ] Normal Coronary Arteries  [ ] Luminal Irregularities  [x ] 1v CAD        INTERVENTION: MLAD 95-0  IMPLANTS: DAWSON          POST-OP DIAGNOSIS    cad      PLAN OF CARE  [ ] D/C Home today  [ ]  D/C in AM  [x ] Return to In-patient bed  [x ] Admit to CCU  [ ] Return for staged procedure:  [ ] CT Surgery consult called  [ x]  Continue DAPT, B-blocker & Statin therapy
No cyanosis, clubbing or edema

## 2019-08-05 ENCOUNTER — MEDICATION RENEWAL (OUTPATIENT)
Age: 49
End: 2019-08-05

## 2019-09-13 ENCOUNTER — OUTPATIENT (OUTPATIENT)
Dept: OUTPATIENT SERVICES | Facility: HOSPITAL | Age: 49
LOS: 1 days | Discharge: HOME | End: 2019-09-13

## 2019-09-13 ENCOUNTER — APPOINTMENT (OUTPATIENT)
Dept: PODIATRY | Facility: CLINIC | Age: 49
End: 2019-09-13
Payer: COMMERCIAL

## 2019-09-13 PROCEDURE — 99213 OFFICE O/P EST LOW 20 MIN: CPT

## 2019-09-15 NOTE — PROCEDURE
[FreeTextEntry1] : -Loss of protective sensation: yes  \par -Presence of foot deformity:    no \par -presence of pre-ulcerative lesion:  no\par   -hemorrhage into callus: no\par -atrophy of heel or metatarsal fat pads: no\par \par -Diabetic neurovascular foot assessment  performed. \par -Discussed with patient diabetic foot hygiene.Patient instructed to regularly check the bottom of the feet\par -Patient given a diabetic foot education sheet\par -referral for RAMANA and PVR's due to recent changes in skin texture and pedal hair loss\par -Pt given a sample of [place] qd x 1 week\par -Rx nystatin powder\par -Return 6 month

## 2019-09-15 NOTE — HISTORY OF PRESENT ILLNESS
[FreeTextEntry1] : \par \par 49 year old M  presents for a diabetic foot evaluation. Mr. ZAFAR relates  burning in his feet. Last A1c was 6.6%. On last visit , patient was referred for RAMANA/PVR due clinical findings of loss of pedal hair; pt never followed up. Pt also relates interdigital macerations in his right foot, which are worsened during the summer months. Pt also complaints of nail changes \par

## 2019-09-20 ENCOUNTER — APPOINTMENT (OUTPATIENT)
Dept: CARDIOLOGY | Facility: CLINIC | Age: 49
End: 2019-09-20

## 2019-09-25 ENCOUNTER — APPOINTMENT (OUTPATIENT)
Dept: CARDIOLOGY | Facility: CLINIC | Age: 49
End: 2019-09-25
Payer: COMMERCIAL

## 2019-09-25 VITALS
HEIGHT: 65 IN | SYSTOLIC BLOOD PRESSURE: 104 MMHG | DIASTOLIC BLOOD PRESSURE: 68 MMHG | HEART RATE: 80 BPM | WEIGHT: 162 LBS | BODY MASS INDEX: 26.99 KG/M2

## 2019-09-25 PROCEDURE — 99214 OFFICE O/P EST MOD 30 MIN: CPT

## 2019-09-25 PROCEDURE — 93000 ELECTROCARDIOGRAM COMPLETE: CPT

## 2019-09-25 NOTE — ASSESSMENT
[FreeTextEntry1] : 50 yo male with pmhx and presentation as above\par cad/ami/pci of lad\par icm, htn, dm\par cont meds\par med mx for cad\par labs reviewed, change statin to 5/week and decrease toprol to 25 daily\par aggressive risk modif\par diet and act as tolerated\par rtc 3-4 months

## 2019-09-25 NOTE — PHYSICAL EXAM
[Normal Appearance] : normal appearance [General Appearance - Well Developed] : well developed [Well Groomed] : well groomed [General Appearance - Well Nourished] : well nourished [No Deformities] : no deformities [General Appearance - In No Acute Distress] : no acute distress [No Oral Pallor] : no oral pallor [Normal Oral Mucosa] : normal oral mucosa [No Oral Cyanosis] : no oral cyanosis [Normal Jugular Venous A Waves Present] : normal jugular venous A waves present [Normal Jugular Venous V Waves Present] : normal jugular venous V waves present [No Jugular Venous Finley A Waves] : no jugular venous finley A waves [Respiration, Rhythm And Depth] : normal respiratory rhythm and effort [Exaggerated Use Of Accessory Muscles For Inspiration] : no accessory muscle use [Auscultation Breath Sounds / Voice Sounds] : lungs were clear to auscultation bilaterally [Heart Rate And Rhythm] : heart rate and rhythm were normal [Heart Sounds] : normal S1 and S2 [Murmurs] : no murmurs present [Abdomen Soft] : soft [Abdomen Tenderness] : non-tender [Abdomen Mass (___ Cm)] : no abdominal mass palpated [Nail Clubbing] : no clubbing of the fingernails [Cyanosis, Localized] : no localized cyanosis [Petechial Hemorrhages (___cm)] : no petechial hemorrhages [] : no ischemic changes [Skin Color & Pigmentation] : normal skin color and pigmentation

## 2019-09-25 NOTE — REVIEW OF SYSTEMS
[Feeling Fatigued] : feeling fatigued [Joint Pain] : joint pain [see HPI] : see HPI [Tingling (Paresthesia)] : tingling [Negative] : Endocrine

## 2019-09-25 NOTE — HISTORY OF PRESENT ILLNESS
[FreeTextEntry1] : 50 yo male with pmhx as below is here for a f/up visit\par 9/18 admitted to [facility] with ami\par s/p pci of lad with vivien\par another admission to [facility] with ua, s/p cath - 2v cad, s/p pci of lad and a staged pci of lcx at [facility] last month\par no cp, valenzuela, + fatigue\par no other cvs complains\par et is much better\par complaint with meds and diet

## 2019-10-10 ENCOUNTER — APPOINTMENT (OUTPATIENT)
Dept: VASCULAR SURGERY | Facility: CLINIC | Age: 49
End: 2019-10-10
Payer: COMMERCIAL

## 2019-10-10 VITALS
BODY MASS INDEX: 28.35 KG/M2 | WEIGHT: 160 LBS | HEIGHT: 63 IN | SYSTOLIC BLOOD PRESSURE: 128 MMHG | DIASTOLIC BLOOD PRESSURE: 74 MMHG

## 2019-10-10 DIAGNOSIS — Z87.891 PERSONAL HISTORY OF NICOTINE DEPENDENCE: ICD-10-CM

## 2019-10-10 PROCEDURE — 93925 LOWER EXTREMITY STUDY: CPT

## 2019-10-10 PROCEDURE — 99203 OFFICE O/P NEW LOW 30 MIN: CPT

## 2019-10-10 NOTE — DATA REVIEWED
[FreeTextEntry1] : I performed an arterial duplex which was medically necessary to evaluate for arterial disease.\par There is no evidence of hemodynamically significant disease in the lower extremity from the groin to the knee. The common, origin of the deep femoral,  superficial femoral and popliteal arteries are patent bilaterally.

## 2019-10-10 NOTE — ASSESSMENT
[FreeTextEntry1] : 50 y/o gentleman with h/o CAD s/p coronary angioplasty and stent one year ago sent in by Endocrinologist for evaluation of PVD. He c/o numbness and tingling to bilateral plantar feet that is persistent. Arterial duplex of bilateral lower extremities performed today was normal and he has normal palpable pulses throughout the lower extremities. No vascular etiology to account for lower extremity symptoms.

## 2019-10-10 NOTE — HISTORY OF PRESENT ILLNESS
[FreeTextEntry1] : 48 y/o gentleman with h/o CAD s/p coronary angioplasty and stent one year ago sent in by Endocrinologist for evaluation of PVD. He c/o numbness and tingling to bilateral plantar feet that is persistent.

## 2019-10-10 NOTE — CONSULT LETTER
[Dear  ___] : Dear  [unfilled], [Consult Letter:] : I had the pleasure of evaluating your patient, [unfilled]. [Please see my note below.] : Please see my note below. [FreeTextEntry2] : Dear Dr. Trevor Raines,

## 2019-10-14 ENCOUNTER — EMERGENCY (EMERGENCY)
Facility: HOSPITAL | Age: 49
LOS: 0 days | Discharge: HOME | End: 2019-10-14
Attending: EMERGENCY MEDICINE | Admitting: INTERNAL MEDICINE

## 2019-10-14 ENCOUNTER — INPATIENT (INPATIENT)
Facility: HOSPITAL | Age: 49
LOS: 1 days | Discharge: HOME | End: 2019-10-16
Attending: INTERNAL MEDICINE | Admitting: INTERNAL MEDICINE
Payer: COMMERCIAL

## 2019-10-14 VITALS
SYSTOLIC BLOOD PRESSURE: 127 MMHG | TEMPERATURE: 96 F | HEART RATE: 70 BPM | WEIGHT: 154.98 LBS | RESPIRATION RATE: 18 BRPM | OXYGEN SATURATION: 100 % | DIASTOLIC BLOOD PRESSURE: 73 MMHG

## 2019-10-14 DIAGNOSIS — Z02.9 ENCOUNTER FOR ADMINISTRATIVE EXAMINATIONS, UNSPECIFIED: ICD-10-CM

## 2019-10-14 LAB
ALBUMIN SERPL ELPH-MCNC: 4.7 G/DL — SIGNIFICANT CHANGE UP (ref 3.5–5.2)
ALP SERPL-CCNC: 61 U/L — SIGNIFICANT CHANGE UP (ref 30–115)
ALT FLD-CCNC: 15 U/L — SIGNIFICANT CHANGE UP (ref 0–41)
ANION GAP SERPL CALC-SCNC: 12 MMOL/L — SIGNIFICANT CHANGE UP (ref 7–14)
APTT BLD: 36.8 SEC — SIGNIFICANT CHANGE UP (ref 27–39.2)
AST SERPL-CCNC: 15 U/L — SIGNIFICANT CHANGE UP (ref 0–41)
BILIRUB SERPL-MCNC: 0.5 MG/DL — SIGNIFICANT CHANGE UP (ref 0.2–1.2)
BUN SERPL-MCNC: 12 MG/DL — SIGNIFICANT CHANGE UP (ref 10–20)
CALCIUM SERPL-MCNC: 9.8 MG/DL — SIGNIFICANT CHANGE UP (ref 8.5–10.1)
CHLORIDE SERPL-SCNC: 99 MMOL/L — SIGNIFICANT CHANGE UP (ref 98–110)
CO2 SERPL-SCNC: 27 MMOL/L — SIGNIFICANT CHANGE UP (ref 17–32)
CREAT SERPL-MCNC: 1 MG/DL — SIGNIFICANT CHANGE UP (ref 0.7–1.5)
GLUCOSE SERPL-MCNC: 93 MG/DL — SIGNIFICANT CHANGE UP (ref 70–99)
HCT VFR BLD CALC: 47.1 % — SIGNIFICANT CHANGE UP (ref 42–52)
HGB BLD-MCNC: 16.4 G/DL — SIGNIFICANT CHANGE UP (ref 14–18)
INR BLD: 1.04 RATIO — SIGNIFICANT CHANGE UP (ref 0.65–1.3)
MCHC RBC-ENTMCNC: 29.3 PG — SIGNIFICANT CHANGE UP (ref 27–31)
MCHC RBC-ENTMCNC: 34.8 G/DL — SIGNIFICANT CHANGE UP (ref 32–37)
MCV RBC AUTO: 84.3 FL — SIGNIFICANT CHANGE UP (ref 80–94)
NRBC # BLD: 0 /100 WBCS — SIGNIFICANT CHANGE UP (ref 0–0)
PLATELET # BLD AUTO: 237 K/UL — SIGNIFICANT CHANGE UP (ref 130–400)
POTASSIUM SERPL-MCNC: 4.4 MMOL/L — SIGNIFICANT CHANGE UP (ref 3.5–5)
POTASSIUM SERPL-SCNC: 4.4 MMOL/L — SIGNIFICANT CHANGE UP (ref 3.5–5)
PROT SERPL-MCNC: 7.1 G/DL — SIGNIFICANT CHANGE UP (ref 6–8)
PROTHROM AB SERPL-ACNC: 11.9 SEC — SIGNIFICANT CHANGE UP (ref 9.95–12.87)
RBC # BLD: 5.59 M/UL — SIGNIFICANT CHANGE UP (ref 4.7–6.1)
RBC # FLD: 14.5 % — SIGNIFICANT CHANGE UP (ref 11.5–14.5)
SODIUM SERPL-SCNC: 138 MMOL/L — SIGNIFICANT CHANGE UP (ref 135–146)
TROPONIN T SERPL-MCNC: <0.01 NG/ML — SIGNIFICANT CHANGE UP
WBC # BLD: 9.24 K/UL — SIGNIFICANT CHANGE UP (ref 4.8–10.8)
WBC # FLD AUTO: 9.24 K/UL — SIGNIFICANT CHANGE UP (ref 4.8–10.8)

## 2019-10-14 PROCEDURE — 99285 EMERGENCY DEPT VISIT HI MDM: CPT

## 2019-10-14 PROCEDURE — 93010 ELECTROCARDIOGRAM REPORT: CPT

## 2019-10-14 PROCEDURE — 71046 X-RAY EXAM CHEST 2 VIEWS: CPT | Mod: 26

## 2019-10-14 RX ORDER — ASPIRIN/CALCIUM CARB/MAGNESIUM 324 MG
325 TABLET ORAL ONCE
Refills: 0 | Status: COMPLETED | OUTPATIENT
Start: 2019-10-14 | End: 2019-10-14

## 2019-10-14 RX ORDER — ERTUGLIFLOZIN 15 MG/1
15 TABLET, FILM COATED ORAL
Qty: 30 | Refills: 5 | Status: DISCONTINUED | COMMUNITY
Start: 2019-02-28 | End: 2019-10-14

## 2019-10-14 RX ADMIN — Medication 325 MILLIGRAM(S): at 21:54

## 2019-10-14 NOTE — ED PROVIDER NOTE - OBJECTIVE STATEMENT
50 yo male with PMH CAD s/p stents x 4 ( last 2 in March 2019), DM, HLD presents c/o left sided chest pain, initially sharp and then pressure like radiating to left shoulder. Pain occurred after walking up stairs at work. Denies any SOB, diaphoresis or palpitations. No N/V/D, abdominal pain, fevers, cough or URI sx. Pt states he has been compliant with meds; cardiologist s Dr. Cervantes.

## 2019-10-14 NOTE — ED PROVIDER NOTE - PHYSICAL EXAMINATION
VITAL SIGNS: noted  CONSTITUTIONAL: Well-developed; well-nourished; in no acute distress  HEAD: Normocephalic; atraumatic  EYES: PERRL, EOM intact; conjunctiva and sclera clear  ENT: No nasal discharge; airway clear. MMM  NECK: Supple; non tender. No anterior cervical lymphadenopathy noted  CARD: S1, S2 normal; no murmurs, gallops, or rubs. Regular rate and rhythm  CHEST: no rash, non tender  RESP: CTAB/L, no wheezes, rales or rhonchi  ABD: Normal bowel sounds; soft; non-distended; non-tender; no hepatosplenomegaly. No CVA tenderness.  EXT: Normal ROM. No calf tenderness or edema. Distal pulses intact  NEURO: Alert, oriented. Grossly unremarkable. No focal deficits  SKIN: Skin exam is warm and dry

## 2019-10-14 NOTE — ED PROVIDER NOTE - CLINICAL SUMMARY MEDICAL DECISION MAKING FREE TEXT BOX
Pt seen for chest pain with concern for ACS. EKG nonischemic, CXR without acute pathology. Labs unremarkable including negative initial troponin. Pt with multiple comorbidities including 4 stents, typical sx concerning for ACS. Pt admitted to telemetry for further workup and treatment

## 2019-10-14 NOTE — ED PROVIDER NOTE - NS ED ROS FT
Constitutional: see HPI  Eyes:  No visual changes, eye pain or discharge.  ENMT:  No hearing changes, pain, discharge or infections. No neck pain or stiffness.  Cardiac:  see HPI, + left sided chest pain, no SOB  Respiratory:  No cough or respiratory distress. No hemoptysis. No history of asthma or RAD.  GI:  No nausea, vomiting, diarrhea or abdominal pain.  :  No dysuria, frequency or burning.  MS:  No myalgia, muscle weakness, joint pain or back pain.  Neuro:  No headache or weakness.  No LOC.  Skin:  No skin rash.   Endocrine: No history of thyroid disease; + hx diabetes.

## 2019-10-15 VITALS — DIASTOLIC BLOOD PRESSURE: 72 MMHG | HEART RATE: 91 BPM | SYSTOLIC BLOOD PRESSURE: 109 MMHG

## 2019-10-15 LAB
ANION GAP SERPL CALC-SCNC: 15 MMOL/L — HIGH (ref 7–14)
BASOPHILS # BLD AUTO: 0.06 K/UL — SIGNIFICANT CHANGE UP (ref 0–0.2)
BASOPHILS NFR BLD AUTO: 0.7 % — SIGNIFICANT CHANGE UP (ref 0–1)
BLD GP AB SCN SERPL QL: SIGNIFICANT CHANGE UP
BUN SERPL-MCNC: 12 MG/DL — SIGNIFICANT CHANGE UP (ref 10–20)
CALCIUM SERPL-MCNC: 9.3 MG/DL — SIGNIFICANT CHANGE UP (ref 8.5–10.1)
CHLORIDE SERPL-SCNC: 99 MMOL/L — SIGNIFICANT CHANGE UP (ref 98–110)
CO2 SERPL-SCNC: 27 MMOL/L — SIGNIFICANT CHANGE UP (ref 17–32)
CREAT SERPL-MCNC: 0.8 MG/DL — SIGNIFICANT CHANGE UP (ref 0.7–1.5)
EOSINOPHIL # BLD AUTO: 0.21 K/UL — SIGNIFICANT CHANGE UP (ref 0–0.7)
EOSINOPHIL NFR BLD AUTO: 2.5 % — SIGNIFICANT CHANGE UP (ref 0–8)
GLUCOSE BLDC GLUCOMTR-MCNC: 113 MG/DL — HIGH (ref 70–99)
GLUCOSE BLDC GLUCOMTR-MCNC: 140 MG/DL — HIGH (ref 70–99)
GLUCOSE BLDC GLUCOMTR-MCNC: 206 MG/DL — HIGH (ref 70–99)
GLUCOSE BLDC GLUCOMTR-MCNC: 94 MG/DL — SIGNIFICANT CHANGE UP (ref 70–99)
GLUCOSE SERPL-MCNC: 73 MG/DL — SIGNIFICANT CHANGE UP (ref 70–99)
HCT VFR BLD CALC: 48.4 % — SIGNIFICANT CHANGE UP (ref 42–52)
HGB BLD-MCNC: 16.7 G/DL — SIGNIFICANT CHANGE UP (ref 14–18)
IMM GRANULOCYTES NFR BLD AUTO: 0.4 % — HIGH (ref 0.1–0.3)
LYMPHOCYTES # BLD AUTO: 3 K/UL — SIGNIFICANT CHANGE UP (ref 1.2–3.4)
LYMPHOCYTES # BLD AUTO: 35.2 % — SIGNIFICANT CHANGE UP (ref 20.5–51.1)
MCHC RBC-ENTMCNC: 28.9 PG — SIGNIFICANT CHANGE UP (ref 27–31)
MCHC RBC-ENTMCNC: 34.5 G/DL — SIGNIFICANT CHANGE UP (ref 32–37)
MCV RBC AUTO: 83.7 FL — SIGNIFICANT CHANGE UP (ref 80–94)
MONOCYTES # BLD AUTO: 0.61 K/UL — HIGH (ref 0.1–0.6)
MONOCYTES NFR BLD AUTO: 7.2 % — SIGNIFICANT CHANGE UP (ref 1.7–9.3)
NEUTROPHILS # BLD AUTO: 4.62 K/UL — SIGNIFICANT CHANGE UP (ref 1.4–6.5)
NEUTROPHILS NFR BLD AUTO: 54 % — SIGNIFICANT CHANGE UP (ref 42.2–75.2)
NRBC # BLD: 0 /100 WBCS — SIGNIFICANT CHANGE UP (ref 0–0)
PLATELET # BLD AUTO: 237 K/UL — SIGNIFICANT CHANGE UP (ref 130–400)
POTASSIUM SERPL-MCNC: 4.2 MMOL/L — SIGNIFICANT CHANGE UP (ref 3.5–5)
POTASSIUM SERPL-SCNC: 4.2 MMOL/L — SIGNIFICANT CHANGE UP (ref 3.5–5)
RBC # BLD: 5.78 M/UL — SIGNIFICANT CHANGE UP (ref 4.7–6.1)
RBC # FLD: 14.2 % — SIGNIFICANT CHANGE UP (ref 11.5–14.5)
SODIUM SERPL-SCNC: 141 MMOL/L — SIGNIFICANT CHANGE UP (ref 135–146)
TROPONIN T SERPL-MCNC: <0.01 NG/ML — SIGNIFICANT CHANGE UP
WBC # BLD: 8.53 K/UL — SIGNIFICANT CHANGE UP (ref 4.8–10.8)
WBC # FLD AUTO: 8.53 K/UL — SIGNIFICANT CHANGE UP (ref 4.8–10.8)

## 2019-10-15 RX ORDER — ASPIRIN/CALCIUM CARB/MAGNESIUM 324 MG
81 TABLET ORAL DAILY
Refills: 0 | Status: DISCONTINUED | OUTPATIENT
Start: 2019-10-15 | End: 2019-10-14

## 2019-10-15 RX ORDER — ATORVASTATIN CALCIUM 80 MG/1
10 TABLET, FILM COATED ORAL AT BEDTIME
Refills: 0 | Status: DISCONTINUED | OUTPATIENT
Start: 2019-10-15 | End: 2019-10-14

## 2019-10-15 RX ORDER — ASPIRIN/CALCIUM CARB/MAGNESIUM 324 MG
81 TABLET ORAL DAILY
Refills: 0 | Status: DISCONTINUED | OUTPATIENT
Start: 2019-10-15 | End: 2019-10-16

## 2019-10-15 RX ORDER — METOPROLOL TARTRATE 50 MG
50 TABLET ORAL
Refills: 0 | Status: DISCONTINUED | OUTPATIENT
Start: 2019-10-16 | End: 2019-10-16

## 2019-10-15 RX ORDER — METOPROLOL TARTRATE 50 MG
50 TABLET ORAL ONCE
Refills: 0 | Status: COMPLETED | OUTPATIENT
Start: 2019-10-15 | End: 2019-10-15

## 2019-10-15 RX ORDER — ATORVASTATIN CALCIUM 80 MG/1
20 TABLET, FILM COATED ORAL AT BEDTIME
Refills: 0 | Status: DISCONTINUED | OUTPATIENT
Start: 2019-10-15 | End: 2019-10-16

## 2019-10-15 RX ORDER — CHLORHEXIDINE GLUCONATE 213 G/1000ML
1 SOLUTION TOPICAL
Refills: 0 | Status: DISCONTINUED | OUTPATIENT
Start: 2019-10-15 | End: 2019-10-14

## 2019-10-15 RX ORDER — METOPROLOL TARTRATE 50 MG
50 TABLET ORAL AT BEDTIME
Refills: 0 | Status: DISCONTINUED | OUTPATIENT
Start: 2019-10-15 | End: 2019-10-14

## 2019-10-15 RX ORDER — FAMOTIDINE 10 MG/ML
20 INJECTION INTRAVENOUS AT BEDTIME
Refills: 0 | Status: DISCONTINUED | OUTPATIENT
Start: 2019-10-15 | End: 2019-10-14

## 2019-10-15 RX ORDER — FAMOTIDINE 10 MG/ML
20 INJECTION INTRAVENOUS DAILY
Refills: 0 | Status: DISCONTINUED | OUTPATIENT
Start: 2019-10-15 | End: 2019-10-16

## 2019-10-15 RX ORDER — PRASUGREL 5 MG/1
10 TABLET, FILM COATED ORAL DAILY
Refills: 0 | Status: DISCONTINUED | OUTPATIENT
Start: 2019-10-15 | End: 2019-10-14

## 2019-10-15 RX ORDER — PRASUGREL 5 MG/1
10 TABLET, FILM COATED ORAL DAILY
Refills: 0 | Status: DISCONTINUED | OUTPATIENT
Start: 2019-10-15 | End: 2019-10-16

## 2019-10-15 RX ORDER — METOPROLOL TARTRATE 50 MG
50 TABLET ORAL DAILY
Refills: 0 | Status: DISCONTINUED | OUTPATIENT
Start: 2019-10-15 | End: 2019-10-15

## 2019-10-15 RX ADMIN — FAMOTIDINE 20 MILLIGRAM(S): 10 INJECTION INTRAVENOUS at 11:39

## 2019-10-15 RX ADMIN — Medication 50 MILLIGRAM(S): at 11:38

## 2019-10-15 RX ADMIN — PRASUGREL 10 MILLIGRAM(S): 5 TABLET, FILM COATED ORAL at 11:39

## 2019-10-15 RX ADMIN — Medication 81 MILLIGRAM(S): at 11:39

## 2019-10-15 RX ADMIN — Medication 2.5 MILLIGRAM(S): at 06:36

## 2019-10-15 RX ADMIN — ATORVASTATIN CALCIUM 20 MILLIGRAM(S): 80 TABLET, FILM COATED ORAL at 21:22

## 2019-10-15 RX ADMIN — Medication 50 MILLIGRAM(S): at 21:22

## 2019-10-15 RX ADMIN — Medication 2.5 MILLIGRAM(S): at 11:39

## 2019-10-15 NOTE — CONSULT NOTE ADULT - ASSESSMENT
49 yr old male with PMH of CAD s/p PCI (4 stents, last one in march 2019), DM, DLD p/w chest pain.     Chest pain-1 episode; atypical  h/o CAD s/p PCI     Plan:  -telemetry  -MI ruled out  -cont DAPT, statin, BB, ACEi  -obtain CCTA   -d/w Dr. Cervantes

## 2019-10-15 NOTE — H&P ADULT - NSHPPHYSICALEXAM_GEN_ALL_CORE
:  VITAL SIGNS: Last 24 Hours  T(C): 36.7 (15 Oct 2019 01:11), Max: 36.7 (15 Oct 2019 01:11)  T(F): 98 (15 Oct 2019 01:11), Max: 98 (15 Oct 2019 01:11)  HR: 91 (15 Oct 2019 01:11) (70 - 91)  BP: 126/91 (15 Oct 2019 01:11) (126/91 - 127/73)  RR: 18 (15 Oct 2019 01:11) (18 - 18)  SpO2: 100% (15 Oct 2019 01:11) (100% - 100%)    PHYSICAL EXAM:  GENERAL:   Awake, alert; NAD.  HEENT:  Head NC/AT; Conjunctivae pink, Sclera anicteric; Oral mucosa moist.  CARDIO:   Increased-NL rate; Regular rhythm; S1 & S2.  RESP:   No rales or rhonchi appreciated.  GI:   Soft; NT/ND; BS; Obese abdomen, No guarding; No rebound tenderness.  EXT:   No edema in LE.

## 2019-10-15 NOTE — PROGRESS NOTE ADULT - ASSESSMENT
Chest pains  r/o ACS  DM II      Monitor in Tele  cardiac w/u per cardiology   ASA/Effient/Statin/B blockers

## 2019-10-15 NOTE — H&P ADULT - HISTORY OF PRESENT ILLNESS
This is a 49 year old male with a significant PMHx of CAD/PCI x4 stents and T2DM who presented with acute onset of chest pain. The pain began after the patient walked up a flight of stairs. He described the pain as sharp, radiating to the shoulder, and associated with nausea and dyspnea. He denied palpitations, diaphoresis, and back pain. He reports compliance with his daily medications. ROS was otherwise negative.

## 2019-10-15 NOTE — PROGRESS NOTE ADULT - SUBJECTIVE AND OBJECTIVE BOX
Pt chart reviewed  Pt belongs  to our practice   Will see pt later   Cardiology consult Pt chart reviewed  Pt belongs  to our practice   Will see pt later   Cardiology consult    Pt seen in ED (spouse at bedside)  comfortable  Nausea (+)  No CP   Seen by cardiology earlier.      SOCIAL HISTORY:  N/A    REVIEW OF SYSTEMS:    Constitutional: No fever, weight loss or fatigue  ENT:  No difficulty hearing, tinnitus, vertigo; No sinus or throat pain  Neck: No pain or stiffness  Respiratory: No cough, wheezing, chills or hemoptysis  Cardiovascular: No chest pain, palpitations, shortness of breath, dizziness or leg swelling  Gastrointestinal: No abdominal or epigastric pain. No nausea, vomiting or hematemesis; No diarrhea or constipation. No melena or hematochezia.  Neurological: No headaches, memory loss, loss of strength, numbness or tremors  Musculoskeletal: No joint pain or swelling; No muscle, back or extremity pain  Psychiatric: No depression, anxiety, mood swings or difficulty sleeping    MEDICATIONS  (STANDING):  aspirin enteric coated 81 milliGRAM(s) Oral daily  atorvastatin 20 milliGRAM(s) Oral at bedtime  enalapril 2.5 milliGRAM(s) Oral daily  famotidine    Tablet 20 milliGRAM(s) Oral daily  metoprolol succinate ER 50 milliGRAM(s) Oral daily  prasugrel 10 milliGRAM(s) Oral daily    MEDICATIONS  (PRN):      Vital Signs Last 24 Hrs  T(C): 36.8 (15 Oct 2019 07:32), Max: 36.8 (15 Oct 2019 07:32)  T(F): 98.2 (15 Oct 2019 07:32), Max: 98.2 (15 Oct 2019 07:32)  HR: 77 (15 Oct 2019 07:32) (70 - 91)  BP: 123/82 (15 Oct 2019 07:32) (123/82 - 149/82)  BP(mean): --  RR: 18 (15 Oct 2019 07:32) (18 - 18)  SpO2: 97% (15 Oct 2019 07:32) (97% - 100%)    PHYSICAL EXAM:    Constitutional: NAD, well-groomed, well-developed  HEENT: PERRLA, EOMI, Normal Hearing, MMM  Neck: No LAD, No JVD  Back: Normal spine flexure, No CVA tenderness  Respiratory: CTAB/L  Cardiovascular: S1 and S2, RRR, no M/G/R  Gastrointestinal: BS+, soft, NT/ND  Extremities: No peripheral edema  Vascular: 2+ peripheral pulses  Neurological: A/O x 3, no focal deficits    LABS:                        16.7   8.53  )-----------( 237      ( 15 Oct 2019 06:10 )             48.4     10-15    141  |  99  |  12  ----------------------------<  73  4.2   |  27  |  0.8    Ca    9.3      15 Oct 2019 06:10    TPro  7.1  /  Alb  4.7  /  TBili  0.5  /  DBili  x   /  AST  15  /  ALT  15  /  AlkPhos  61  10-14    PT/INR - ( 14 Oct 2019 21:45 )   PT: 11.90 sec;   INR: 1.04 ratio         PTT - ( 14 Oct 2019 21:45 )  PTT:36.8 sec    Drug Screen Urine:  Alcohol Level   N/A        RADIOLOGY & ADDITIONAL STUDIES:  N/A

## 2019-10-15 NOTE — CONSULT NOTE ADULT - SUBJECTIVE AND OBJECTIVE BOX
Patient is a 49y old  Male who presents with a chief complaint of Rule out ACS (15 Oct 2019 08:46)    HPI:  Pt's chart was discontinued in [place] in error. Pt is not discharged and currently in ED.    49 yr old male with PMH of CAD s/p PCI ( 4 stents, last one in march 2019), DM, DLD p/w chest pain. pt had sharp chest pain at rest yesterday, radiating to shoulder, associated with dyspnea. pain was unrelated to exertion, lasted 30 mins and resolved on its own. pt also had some nausea last night. denies any palpitations/ LOC/ fever/ cough/ abd pain. pt is compliant with medications. Pt is currently asymptomatic.     ROS:  All other systems reviewed and are negative    PAST MEDICAL & SURGICAL HISTORY  Hyperlipidemia  CAD (coronary artery disease): x4 stents (2018/2019)      FAMILY HISTORY:  FAMILY HISTORY:  NC     SOCIAL HISTORY:  [-]smoker  [-]Alcohol  [-]Drug    ALLERGIES:  No Known Allergies      MEDICATIONS:  MEDICATIONS  (STANDING):    MEDICATIONS  (PRN):      HOME MEDICATIONS:  Home Medications:  aspirin 81 mg oral tablet: 1 tab(s) orally once a day (15 Oct 2019 02:32)  enalapril 2.5 mg oral tablet: 1 tab(s) orally once a day (15 Oct 2019 02:31)  ezetimibe 10 mg oral tablet: 1 tab(s) orally once a day (15 Oct 2019 02:31)  famotidine 20 mg oral tablet: 1 tab(s) orally once a day (at bedtime) (15 Oct 2019 02:34)  metFORMIN 1000 mg oral tablet: 1 tab(s) orally once a day (at bedtime) (15 Oct 2019 02:34)  metoprolol succinate 50 mg oral tablet, extended release: 1 tab(s) orally once a day (15 Oct 2019 02:34)  pioglitazone 30 mg oral tablet: 1 tab(s) orally once a day (15 Oct 2019 02:31)  prasugrel 10 mg oral tablet: 1 tab(s) orally once a day (15 Oct 2019 02:31)  pravastatin 40 mg oral tablet: 1 tab(s) orally once a day (15 Oct 2019 02:33)  Steglatro 15 mg oral tablet: 1 tab(s) orally once a day (in the morning) (15 Oct 2019 02:32)  Trulicity Pen 0.75 mg/0.5 mL subcutaneous solution: 1 dose(s) subcutaneous once a week (15 Oct 2019 02:33)      VITALS:   T(F): 98.2 (10-15 @ 07:32), Max: 98.2 (10-15 @ 07:32)  HR: 77 (10-15 @ 07:32) (70 - 91)  BP: 123/82 (10-15 @ 07:32) (123/82 - 149/82)  BP(mean): --  RR: 18 (10-15 @ 07:32) (18 - 18)  SpO2: 97% (10-15 @ 07:32) (97% - 100%)    I&O's Summary      PHYSICAL EXAM:  GEN: Alert and oriented X 3, No acute distress  HEENT: no pallor  NECK: Supple, no JVD  LUNGS: Clear to auscultation bilaterally  CARDIOVASCULAR: S1/S2 regular, no murmurs or rubs  ABD: Soft, BS+  EXT: No Lower extremity edema/cyanosis  NEURO: AAOx3    LABS:                        16.7   8.53  )-----------( 237      ( 15 Oct 2019 06:10 )             48.4     10-15    141  |  99  |  12  ----------------------------<  73  4.2   |  27  |  0.8    Ca    9.3      15 Oct 2019 06:10    TPro  7.1  /  Alb  4.7  /  TBili  0.5  /  DBili  x   /  AST  15  /  ALT  15  /  AlkPhos  61  10-14    PT/INR - ( 14 Oct 2019 21:45 )   PT: 11.90 sec;   INR: 1.04 ratio         PTT - ( 14 Oct 2019 21:45 )  PTT:36.8 sec  Troponin T, Serum: <0.01 ng/mL (10-15-19 @ 06:10)  Troponin T, Serum: <0.01 ng/mL (10-14-19 @ 21:45)    CARDIAC MARKERS ( 15 Oct 2019 06:10 )  x     / <0.01 ng/mL / x     / x     / x      CARDIAC MARKERS ( 14 Oct 2019 21:45 )  x     / <0.01 ng/mL / x     / x     / x            Troponin trend:        Hemoglobin A1C   Thyroid      RADIOLOGY:        ECG:  < from: 12 Lead ECG (10.14.19 @ 20:59) >  Diagnosis Line Normal sinus rhythm  Normal ECG    < end of copied text > Patient is a 49y old  Male who presents with a chief complaint of Rule out ACS (15 Oct 2019 08:46)    HPI:  Pt's chart was discontinued in [place] in error. Pt is not discharged and currently in ED.    49 yr old male with PMH of CAD s/p PCI ( 4 stents, last one in march 2019), DM, DLD p/w chest pain. pt had sharp chest pain at rest yesterday, radiating to shoulder, associated with dyspnea. pain was unrelated to exertion, lasted 30 mins and resolved on its own. pt also had some nausea last night. denies any palpitations/ LOC/ fever/ cough/ abd pain. pt is compliant with medications. Pt is currently asymptomatic.     ROS:  All other systems reviewed and are negative    PAST MEDICAL & SURGICAL HISTORY  Hyperlipidemia  CAD (coronary artery disease): x4 stents (2018/2019)      FAMILY HISTORY:  FAMILY HISTORY:  NC     SOCIAL HISTORY:  [-]smoker  [-]Alcohol  [-]Drug    ALLERGIES:  No Known Allergies      MEDICATIONS:  MEDICATIONS  (STANDING):    MEDICATIONS  (PRN):      HOME MEDICATIONS:  Home Medications:  aspirin 81 mg oral tablet: 1 tab(s) orally once a day (15 Oct 2019 02:32)  enalapril 2.5 mg oral tablet: 1 tab(s) orally once a day (15 Oct 2019 02:31)  ezetimibe 10 mg oral tablet: 1 tab(s) orally once a day (15 Oct 2019 02:31)  famotidine 20 mg oral tablet: 1 tab(s) orally once a day (at bedtime) (15 Oct 2019 02:34)  metFORMIN 1000 mg oral tablet: 1 tab(s) orally once a day (at bedtime) (15 Oct 2019 02:34)  metoprolol succinate 50 mg oral tablet, extended release: 1 tab(s) orally once a day (15 Oct 2019 02:34)  pioglitazone 30 mg oral tablet: 1 tab(s) orally once a day (15 Oct 2019 02:31)  prasugrel 10 mg oral tablet: 1 tab(s) orally once a day (15 Oct 2019 02:31)  pravastatin 40 mg oral tablet: 1 tab(s) orally once a day (15 Oct 2019 02:33)  Steglatro 15 mg oral tablet: 1 tab(s) orally once a day (in the morning) (15 Oct 2019 02:32)  Trulicity Pen 0.75 mg/0.5 mL subcutaneous solution: 1 dose(s) subcutaneous once a week (15 Oct 2019 02:33)      VITALS:   T(F): 98.2 (10-15 @ 07:32), Max: 98.2 (10-15 @ 07:32)  HR: 77 (10-15 @ 07:32) (70 - 91)  BP: 123/82 (10-15 @ 07:32) (123/82 - 149/82)  BP(mean): --  RR: 18 (10-15 @ 07:32) (18 - 18)  SpO2: 97% (10-15 @ 07:32) (97% - 100%)    I&O's Summary      PHYSICAL EXAM:  GEN: Alert and oriented X 3, No acute distress  HEENT: no pallor  NECK: Supple, no JVD  LUNGS: Clear to auscultation bilaterally  CARDIOVASCULAR: S1/S2 regular, no murmurs or rubs  ABD: Soft, BS+  EXT/MS: No Lower extremity edema/cyanosis  NEURO: AAOx3    LABS:                        16.7   8.53  )-----------( 237      ( 15 Oct 2019 06:10 )             48.4     10-15    141  |  99  |  12  ----------------------------<  73  4.2   |  27  |  0.8    Ca    9.3      15 Oct 2019 06:10    TPro  7.1  /  Alb  4.7  /  TBili  0.5  /  DBili  x   /  AST  15  /  ALT  15  /  AlkPhos  61  10-14    PT/INR - ( 14 Oct 2019 21:45 )   PT: 11.90 sec;   INR: 1.04 ratio         PTT - ( 14 Oct 2019 21:45 )  PTT:36.8 sec  Troponin T, Serum: <0.01 ng/mL (10-15-19 @ 06:10)  Troponin T, Serum: <0.01 ng/mL (10-14-19 @ 21:45)    CARDIAC MARKERS ( 15 Oct 2019 06:10 )  x     / <0.01 ng/mL / x     / x     / x      CARDIAC MARKERS ( 14 Oct 2019 21:45 )  x     / <0.01 ng/mL / x     / x     / x            Troponin trend:        Hemoglobin A1C   Thyroid      RADIOLOGY:        ECG:  < from: 12 Lead ECG (10.14.19 @ 20:59) >  Diagnosis Line Normal sinus rhythm  Normal ECG    < end of copied text >

## 2019-10-15 NOTE — H&P ADULT - NSHPSOCIALHISTORY_GEN_ALL_CORE
Lives at home with family  Works as   Smoke 5 ciggarettes per day x10 years; quit 20 years ago  Denied EtOH and Illicit drug use

## 2019-10-15 NOTE — ED ADULT NURSE REASSESSMENT NOTE - NS ED NURSE REASSESS COMMENT FT1
Pt received from previous RN. Pt AOx4. Ambulates independently. Tele monitoring in place. Pt has no complaints of CP or SOB at this time. All medications given as per MD orders. Pt has no parameters for admitting notes, vitals or A&I. Safety and comfort measures in place. Call bell in reach. Will continue to monitor.

## 2019-10-15 NOTE — H&P ADULT - NSHPLABSRESULTS_GEN_ALL_CORE
:  LAB RESULTS:                        16.4   9.24  )-----------( 237      ( 14 Oct 2019 21:45 )             47.1     138  |  99  |  12  ----------------------------<  93  4.4   |  27  |  1.0    Ca    9.8      14 Oct 2019 21:45    TPro  7.1  /  Alb  4.7  /  TBili  0.5  /  DBili  x   /  AST  15  /  ALT  15  /  AlkPhos  61  10-14    PT/INR - ( 14 Oct 2019 21:45 )   PT: 11.90 sec;   INR: 1.04 ratio    PTT - ( 14 Oct 2019 21:45 )  PTT:36.8 sec    Troponin T, Serum: <0.01 ng/mL (10-14-19 @ 21:45)    CARDIAC MARKERS ( 14 Oct 2019 21:45 )  x     / <0.01 ng/mL / x     / x     / x        MICROBIOLOGY: NTR    RADIOLOGY: NTR    ALLERGIES:  No Known Allergies    HOME MEDICATIONS:  aspirin 81 mg oral tablet: 1 tab(s) orally once a day (15 Oct 2019 02:32)  enalapril 2.5 mg oral tablet: 1 tab(s) orally once a day (15 Oct 2019 02:31)  ezetimibe 10 mg oral tablet: 1 tab(s) orally once a day (15 Oct 2019 02:31)  famotidine 20 mg oral tablet: 1 tab(s) orally once a day (at bedtime) (15 Oct 2019 02:34)  metFORMIN 1000 mg oral tablet: 1 tab(s) orally once a day (at bedtime) (15 Oct 2019 02:34)  metoprolol succinate 50 mg oral tablet, extended release: 1 tab(s) orally once a day (15 Oct 2019 02:34)  pioglitazone 30 mg oral tablet: 1 tab(s) orally once a day (15 Oct 2019 02:31)  prasugrel 10 mg oral tablet: 1 tab(s) orally once a day (15 Oct 2019 02:31)  pravastatin 40 mg oral tablet: 1 tab(s) orally once a day (15 Oct 2019 02:33)  Steglatro 15 mg oral tablet: 1 tab(s) orally once a day (in the morning) (15 Oct 2019 02:32)  Trulicity Pen 0.75 mg/0.5 mL subcutaneous solution: 1 dose(s) subcutaneous once a week (15 Oct 2019 02:33)    ===========================================================

## 2019-10-15 NOTE — H&P ADULT - ASSESSMENT
This is a 49 year old male with a significant PMHx of CAD/PCI x4 stents and T2DM who presented with acute onset of chest pain. T    Rule out ACS; Typical chest pain; Hx of CAD/PCI x4 stents  - CXR unremarkable  - EKG without ST or T-wave changes consistent with ischemia; Serial EKGs  - Troponin negative x1; Trending  - Continue ASA, Prasugrel, BB, ACE-I, and Statin  - Morphine and O2 supplementation PRN  - Follow up Cardiology recommendations (Dr. Cervantes)    Hx of T2DM: Holding home medications; Start insulin if sugar >180 consistently  Hx of HLD: Resume statin  Hx of GERD: Famotidine QHS    Electrolyte Imbalances: WNL      GI ppx:                                   [X] Not indicated    DVT ppx:  [X] Lovenox 40mg SubQ    Fluids:   [X] PO     Activity:  [X] Bed Rest    BMI:  Weight (kg): 70.3 (10-14)    DISPO:  Patient to be discharged when condition(s) optimized.  [X] Home    CODE STATUS  [X] FUL

## 2019-10-16 ENCOUNTER — TRANSCRIPTION ENCOUNTER (OUTPATIENT)
Age: 49
End: 2019-10-16

## 2019-10-16 LAB
ANION GAP SERPL CALC-SCNC: 13 MMOL/L — SIGNIFICANT CHANGE UP (ref 7–14)
BASOPHILS # BLD AUTO: 0.03 K/UL — SIGNIFICANT CHANGE UP (ref 0–0.2)
BASOPHILS NFR BLD AUTO: 0.3 % — SIGNIFICANT CHANGE UP (ref 0–1)
BUN SERPL-MCNC: 16 MG/DL — SIGNIFICANT CHANGE UP (ref 10–20)
CALCIUM SERPL-MCNC: 9.2 MG/DL — SIGNIFICANT CHANGE UP (ref 8.5–10.1)
CHLORIDE SERPL-SCNC: 103 MMOL/L — SIGNIFICANT CHANGE UP (ref 98–110)
CO2 SERPL-SCNC: 24 MMOL/L — SIGNIFICANT CHANGE UP (ref 17–32)
CREAT SERPL-MCNC: 1 MG/DL — SIGNIFICANT CHANGE UP (ref 0.7–1.5)
EOSINOPHIL # BLD AUTO: 0.22 K/UL — SIGNIFICANT CHANGE UP (ref 0–0.7)
EOSINOPHIL NFR BLD AUTO: 2.5 % — SIGNIFICANT CHANGE UP (ref 0–8)
GLUCOSE BLDC GLUCOMTR-MCNC: 126 MG/DL — HIGH (ref 70–99)
GLUCOSE SERPL-MCNC: 113 MG/DL — HIGH (ref 70–99)
HCT VFR BLD CALC: 49.4 % — SIGNIFICANT CHANGE UP (ref 42–52)
HGB BLD-MCNC: 17.2 G/DL — SIGNIFICANT CHANGE UP (ref 14–18)
IMM GRANULOCYTES NFR BLD AUTO: 0.2 % — SIGNIFICANT CHANGE UP (ref 0.1–0.3)
LYMPHOCYTES # BLD AUTO: 3.1 K/UL — SIGNIFICANT CHANGE UP (ref 1.2–3.4)
LYMPHOCYTES # BLD AUTO: 35.4 % — SIGNIFICANT CHANGE UP (ref 20.5–51.1)
MCHC RBC-ENTMCNC: 29.5 PG — SIGNIFICANT CHANGE UP (ref 27–31)
MCHC RBC-ENTMCNC: 34.8 G/DL — SIGNIFICANT CHANGE UP (ref 32–37)
MCV RBC AUTO: 84.7 FL — SIGNIFICANT CHANGE UP (ref 80–94)
MONOCYTES # BLD AUTO: 0.66 K/UL — HIGH (ref 0.1–0.6)
MONOCYTES NFR BLD AUTO: 7.5 % — SIGNIFICANT CHANGE UP (ref 1.7–9.3)
NEUTROPHILS # BLD AUTO: 4.72 K/UL — SIGNIFICANT CHANGE UP (ref 1.4–6.5)
NEUTROPHILS NFR BLD AUTO: 54.1 % — SIGNIFICANT CHANGE UP (ref 42.2–75.2)
NRBC # BLD: 0 /100 WBCS — SIGNIFICANT CHANGE UP (ref 0–0)
PLATELET # BLD AUTO: 237 K/UL — SIGNIFICANT CHANGE UP (ref 130–400)
POTASSIUM SERPL-MCNC: 4.2 MMOL/L — SIGNIFICANT CHANGE UP (ref 3.5–5)
POTASSIUM SERPL-SCNC: 4.2 MMOL/L — SIGNIFICANT CHANGE UP (ref 3.5–5)
RBC # BLD: 5.83 M/UL — SIGNIFICANT CHANGE UP (ref 4.7–6.1)
RBC # FLD: 14.6 % — HIGH (ref 11.5–14.5)
SODIUM SERPL-SCNC: 140 MMOL/L — SIGNIFICANT CHANGE UP (ref 135–146)
WBC # BLD: 8.75 K/UL — SIGNIFICANT CHANGE UP (ref 4.8–10.8)
WBC # FLD AUTO: 8.75 K/UL — SIGNIFICANT CHANGE UP (ref 4.8–10.8)

## 2019-10-16 PROCEDURE — 75574 CT ANGIO HRT W/3D IMAGE: CPT | Mod: 26

## 2019-10-16 PROCEDURE — 99222 1ST HOSP IP/OBS MODERATE 55: CPT

## 2019-10-16 RX ORDER — ATORVASTATIN CALCIUM 80 MG/1
1 TABLET, FILM COATED ORAL
Qty: 0 | Refills: 0 | DISCHARGE
Start: 2019-10-16

## 2019-10-16 RX ADMIN — FAMOTIDINE 20 MILLIGRAM(S): 10 INJECTION INTRAVENOUS at 12:46

## 2019-10-16 RX ADMIN — PRASUGREL 10 MILLIGRAM(S): 5 TABLET, FILM COATED ORAL at 12:46

## 2019-10-16 RX ADMIN — Medication 50 MILLIGRAM(S): at 07:36

## 2019-10-16 RX ADMIN — Medication 81 MILLIGRAM(S): at 12:46

## 2019-10-16 NOTE — DISCHARGE NOTE PROVIDER - HOSPITAL COURSE
49 yr old male with PMH of CAD s/p PCI ( 4 stents, last one in march 2019), DM, DLD p/w chest pain. pt had sharp chest pain at rest yesterday, radiating to shoulder, associated with dyspnea. pain was unrelated to exertion, lasted 30 mins and resolved on its own. pt also had some nausea last night. denies any palpitations/ LOC/ fever/ cough/ abd pain. pt is compliant with medications. Pt is currently asymptomatic.         CCTA:         1.  Patent coronary stents in proximal and mid left anterior descending     artery.    2.  Patent stent in proximal acute marginal branch of left circumflex     coronary artery.    3.  Limited evaluation of proximal ramus intermedius, obscured by large     calcified plaque.     4.  Rest of the coronary arteries are patent without any evidence of     flow-limiting stenosis.        Patient to go home on medical management to follow up with Dr Cervantes in 2 weeks. 49 yr old male with PMH of CAD s/p PCI ( 4 stents, last one in march 2019), DM, DLD p/w chest pain. pt had sharp chest pain at rest yesterday, radiating to shoulder, associated with dyspnea. pain was unrelated to exertion, lasted 30 mins and resolved on its own. pt also had some nausea last night. denies any palpitations/ LOC/ fever/ cough/ abd pain. pt is compliant with medications. Pt is currently asymptomatic.         CCTA:         1.  Patent coronary stents in proximal and mid left anterior descending     artery.    2.  Patent stent in proximal acute marginal branch of left circumflex     coronary artery.    3.  Limited evaluation of proximal ramus intermedius, obscured by large     calcified plaque.     4.  Rest of the coronary arteries are patent without any evidence of     flow-limiting stenosis.        Patient to go home on medical management to follow up with Dr Cervantes in 2 weeks.                        Med Attending:    Agree with above plan    will f/u in office in a week

## 2019-10-16 NOTE — DISCHARGE NOTE NURSING/CASE MANAGEMENT/SOCIAL WORK - PATIENT PORTAL LINK FT
You can access the FollowMyHealth Patient Portal offered by [facility] by registering at the following website: [email or web address]. By joining [facility]’s FollowMyHealth portal, you will also be able to view your health information using other applications (apps) compatible with our system.

## 2019-10-16 NOTE — DISCHARGE NOTE PROVIDER - NSDCCPCAREPLAN_GEN_ALL_CORE_FT
PRINCIPAL DISCHARGE DIAGNOSIS  Diagnosis: Chest pain  Assessment and Plan of Treatment: CAT scan of coronories didnot show any ischemic disease  Your chest pain is most probably not related to your heart condition  Please Follow up ith Dr Cervantes in 2 weeks and continue your heart medications.

## 2019-10-16 NOTE — DISCHARGE NOTE PROVIDER - CARE PROVIDER_API CALL
Kodak Cervantes)  Cardiovascular Disease; Internal Medicine; Interventional Cardiology; Nuclear Cardiology  [address] [place] [address]  Phone: [phone]  Fax: [phone]  Follow Up Time:

## 2019-10-18 DIAGNOSIS — Z79.84 LONG TERM (CURRENT) USE OF ORAL HYPOGLYCEMIC DRUGS: ICD-10-CM

## 2019-10-18 DIAGNOSIS — I25.10 ATHEROSCLEROTIC HEART DISEASE OF NATIVE CORONARY ARTERY WITHOUT ANGINA PECTORIS: ICD-10-CM

## 2019-10-18 DIAGNOSIS — E78.5 HYPERLIPIDEMIA, UNSPECIFIED: ICD-10-CM

## 2019-10-18 DIAGNOSIS — R07.9 CHEST PAIN, UNSPECIFIED: ICD-10-CM

## 2019-10-18 DIAGNOSIS — Z87.891 PERSONAL HISTORY OF NICOTINE DEPENDENCE: ICD-10-CM

## 2019-10-18 DIAGNOSIS — K21.9 GASTRO-ESOPHAGEAL REFLUX DISEASE WITHOUT ESOPHAGITIS: ICD-10-CM

## 2019-10-18 DIAGNOSIS — Z95.5 PRESENCE OF CORONARY ANGIOPLASTY IMPLANT AND GRAFT: ICD-10-CM

## 2019-10-18 DIAGNOSIS — E11.9 TYPE 2 DIABETES MELLITUS WITHOUT COMPLICATIONS: ICD-10-CM

## 2019-10-21 RX ORDER — METFORMIN HYDROCHLORIDE 850 MG/1
1 TABLET ORAL
Qty: 0 | Refills: 0 | DISCHARGE

## 2019-10-21 RX ORDER — PRASUGREL 5 MG/1
1 TABLET, FILM COATED ORAL
Qty: 0 | Refills: 0 | DISCHARGE

## 2019-10-21 RX ORDER — PIOGLITAZONE HYDROCHLORIDE 15 MG/1
1 TABLET ORAL
Qty: 0 | Refills: 0 | DISCHARGE

## 2019-10-21 RX ORDER — DULAGLUTIDE 4.5 MG/.5ML
1 INJECTION, SOLUTION SUBCUTANEOUS
Qty: 0 | Refills: 0 | DISCHARGE

## 2019-10-21 RX ORDER — FAMOTIDINE 10 MG/ML
1 INJECTION INTRAVENOUS
Qty: 0 | Refills: 0 | DISCHARGE

## 2019-10-21 RX ORDER — ERTUGLIFLOZIN 5 MG/1
1 TABLET, FILM COATED ORAL
Qty: 0 | Refills: 0 | DISCHARGE

## 2019-10-21 RX ORDER — EZETIMIBE 10 MG/1
1 TABLET ORAL
Qty: 0 | Refills: 0 | DISCHARGE

## 2019-10-21 RX ORDER — METOPROLOL TARTRATE 50 MG
1 TABLET ORAL
Qty: 0 | Refills: 0 | DISCHARGE

## 2019-10-21 RX ORDER — ASPIRIN/CALCIUM CARB/MAGNESIUM 324 MG
1 TABLET ORAL
Qty: 0 | Refills: 0 | DISCHARGE

## 2019-11-21 ENCOUNTER — APPOINTMENT (OUTPATIENT)
Dept: ENDOCRINOLOGY | Facility: CLINIC | Age: 49
End: 2019-11-21

## 2019-11-21 ENCOUNTER — OUTPATIENT (OUTPATIENT)
Dept: OUTPATIENT SERVICES | Facility: HOSPITAL | Age: 49
LOS: 1 days | Discharge: HOME | End: 2019-11-21

## 2019-11-21 VITALS
SYSTOLIC BLOOD PRESSURE: 111 MMHG | HEART RATE: 80 BPM | DIASTOLIC BLOOD PRESSURE: 74 MMHG | BODY MASS INDEX: 28.35 KG/M2 | HEIGHT: 63 IN | WEIGHT: 160 LBS

## 2019-11-21 PROBLEM — I25.10 ATHEROSCLEROTIC HEART DISEASE OF NATIVE CORONARY ARTERY WITHOUT ANGINA PECTORIS: Chronic | Status: ACTIVE | Noted: 2019-10-15

## 2019-11-21 PROBLEM — E78.5 HYPERLIPIDEMIA, UNSPECIFIED: Chronic | Status: ACTIVE | Noted: 2019-10-15

## 2019-11-23 NOTE — ASSESSMENT
[FreeTextEntry1] : no major changes. [Long Term Vascular Complications] : long term vascular complications of diabetes [Carbohydrate Consistent Diet] : carbohydrate consistent diet [Diabetes Foot Care] : diabetes foot care [Importance of Diet and Exercise] : importance of diet and exercise to improve glycemic control, achieve weight loss and improve cardiovascular health

## 2019-11-23 NOTE — PHYSICAL EXAM
[No Acute Distress] : no acute distress [Alert] : alert [Well Nourished] : well nourished [Well Developed] : well developed [Normal Sclera/Conjunctiva] : normal sclera/conjunctiva [EOMI] : extra ocular movement intact [No Proptosis] : no proptosis [Normal Oropharynx] : the oropharynx was normal [No Respiratory Distress] : no respiratory distress [Thyroid Not Enlarged] : the thyroid was not enlarged [No Thyroid Nodules] : there were no palpable thyroid nodules [No Accessory Muscle Use] : no accessory muscle use [Clear to Auscultation] : lungs were clear to auscultation bilaterally [Normal Rate] : heart rate was normal  [Regular Rhythm] : with a regular rhythm [Normal S1, S2] : normal S1 and S2 [Normal Bowel Sounds] : normal bowel sounds [No Edema] : there was no peripheral edema [Pedal Pulses Normal] : the pedal pulses are present [Soft] : abdomen soft [Not Tender] : non-tender [Anterior Cervical Nodes] : anterior cervical nodes [Post Cervical Nodes] : posterior cervical nodes [Not Distended] : not distended [Normal] : normal and non tender [Axillary Nodes] : axillary nodes [No Spinal Tenderness] : no spinal tenderness [Spine Straight] : spine straight [No Stigmata of Cushings Syndrome] : no stigmata of cushings syndrome [Normal Strength/Tone] : muscle strength and tone were normal [No Rash] : no rash [Normal Gait] : normal gait [Acanthosis Nigricans] : no acanthosis nigricans [Oriented x3] : oriented to person, place, and time [No Tremors] : no tremors [Normal Reflexes] : deep tendon reflexes were 2+ and symmetric

## 2019-11-23 NOTE — DATA REVIEWED
[No studies available for review at this time.] : No studies available for review at this time. [FreeTextEntry1] : normal albs

## 2019-11-27 ENCOUNTER — RX RENEWAL (OUTPATIENT)
Age: 49
End: 2019-11-27

## 2020-01-10 ENCOUNTER — APPOINTMENT (OUTPATIENT)
Dept: CARDIOLOGY | Facility: CLINIC | Age: 50
End: 2020-01-10
Payer: COMMERCIAL

## 2020-01-10 VITALS
BODY MASS INDEX: 28.88 KG/M2 | DIASTOLIC BLOOD PRESSURE: 70 MMHG | HEIGHT: 63 IN | SYSTOLIC BLOOD PRESSURE: 116 MMHG | HEART RATE: 64 BPM | WEIGHT: 163 LBS

## 2020-01-10 PROCEDURE — 99214 OFFICE O/P EST MOD 30 MIN: CPT

## 2020-01-10 PROCEDURE — 93000 ELECTROCARDIOGRAM COMPLETE: CPT

## 2020-01-10 NOTE — REVIEW OF SYSTEMS
[see HPI] : see HPI [Feeling Fatigued] : feeling fatigued [Joint Pain] : joint pain [Tingling (Paresthesia)] : tingling [Negative] : Endocrine

## 2020-01-10 NOTE — HISTORY OF PRESENT ILLNESS
[FreeTextEntry1] : 48 yo male with pmhx as below is here for a f/up visit\par 9/18 admitted to [facility] with ami\par s/p pci of lad with vivien\par another admission to [facility] with ua, s/p cath - 2v cad, s/p pci of lad and a staged pci of lcx at [facility] last month\par no cp, valenzuela, fatigue\par no major cvs complains\par et is stable\par complaint with meds and diet

## 2020-01-10 NOTE — ASSESSMENT
[FreeTextEntry1] : 50 yo male with pmhx and presentation as above\par cad/ami/pci of lad\par icm, htn, dm\par cont meds\par med mx for cad\par labs reviewed, all at goal\par f/up with endo for dm\par aggressive risk modif\par diet and act as tolerated\par rtc 3-4 months

## 2020-01-10 NOTE — PHYSICAL EXAM
[General Appearance - Well Developed] : well developed [Normal Appearance] : normal appearance [Well Groomed] : well groomed [General Appearance - Well Nourished] : well nourished [No Deformities] : no deformities [General Appearance - In No Acute Distress] : no acute distress [Normal Oral Mucosa] : normal oral mucosa [No Oral Cyanosis] : no oral cyanosis [No Oral Pallor] : no oral pallor [Normal Jugular Venous A Waves Present] : normal jugular venous A waves present [Normal Jugular Venous V Waves Present] : normal jugular venous V waves present [No Jugular Venous Finley A Waves] : no jugular venous finley A waves [Heart Sounds] : normal S1 and S2 [Heart Rate And Rhythm] : heart rate and rhythm were normal [Murmurs] : no murmurs present [Respiration, Rhythm And Depth] : normal respiratory rhythm and effort [Exaggerated Use Of Accessory Muscles For Inspiration] : no accessory muscle use [Abdomen Tenderness] : non-tender [Abdomen Soft] : soft [Auscultation Breath Sounds / Voice Sounds] : lungs were clear to auscultation bilaterally [Abdomen Mass (___ Cm)] : no abdominal mass palpated [Nail Clubbing] : no clubbing of the fingernails [Cyanosis, Localized] : no localized cyanosis [] : no ischemic changes [Petechial Hemorrhages (___cm)] : no petechial hemorrhages [Skin Color & Pigmentation] : normal skin color and pigmentation

## 2020-02-02 ENCOUNTER — RX RENEWAL (OUTPATIENT)
Age: 50
End: 2020-02-02

## 2020-03-06 ENCOUNTER — OUTPATIENT (OUTPATIENT)
Dept: OUTPATIENT SERVICES | Facility: HOSPITAL | Age: 50
LOS: 1 days | Discharge: HOME | End: 2020-03-06

## 2020-03-06 ENCOUNTER — APPOINTMENT (OUTPATIENT)
Dept: PODIATRY | Facility: CLINIC | Age: 50
End: 2020-03-06
Payer: COMMERCIAL

## 2020-03-06 PROCEDURE — 99213 OFFICE O/P EST LOW 20 MIN: CPT

## 2020-03-06 NOTE — PROCEDURE
[FreeTextEntry1] : -Loss of protective sensation: no\par -Presence of foot deformity:    no \par -presence of pre-ulcerative lesion:  no\par   -hemorrhage into callus: no\par -atrophy of heel or metatarsal fat pads: no\par \par -Diabetic neurovascular foot assessment  performed. \par -Discussed with patient diabetic foot hygiene.Patient instructed to regularly check the bottom of the feet\par -Patient given a diabetic foot education sheet\par -Rx ammonium lactate \par -Return 12  month

## 2020-03-06 NOTE — PHYSICAL EXAM
[General Appearance - Alert] : alert [General Appearance - In No Acute Distress] : in no acute distress [Sclera] : the sclera and conjunctiva were normal [PERRL With Normal Accommodation] : pupils were equal in size, round, and reactive to light [Vibration Dec.] : diminished vibratory sensation at the level of the toes [Oriented To Time, Place, And Person] : oriented to person, place, and time [Diminished Throughout Right Foot] : normal tactile sensation with monofilament testing throughout right foot [Diminished Throughout Left Foot] : normal tactile sensation with monofilament testing throughout left foot [FreeTextEntry1] : dry plantar skin b/l

## 2020-03-06 NOTE — HISTORY OF PRESENT ILLNESS
[FreeTextEntry1] : \par \par 49 year old M  returns for a diabetic foot evaluation. Mr. ZAFAR relates  burning in his feet. Last A1c was 6.3%. Pt seen by vascular surgery. Arterial duplex was performed-->no vascular etiology \par

## 2020-03-30 ENCOUNTER — RX RENEWAL (OUTPATIENT)
Age: 50
End: 2020-03-30

## 2020-04-07 ENCOUNTER — RX RENEWAL (OUTPATIENT)
Age: 50
End: 2020-04-07

## 2020-05-14 ENCOUNTER — APPOINTMENT (OUTPATIENT)
Dept: ENDOCRINOLOGY | Facility: CLINIC | Age: 50
End: 2020-05-14

## 2020-05-22 ENCOUNTER — APPOINTMENT (OUTPATIENT)
Dept: CARDIOLOGY | Facility: CLINIC | Age: 50
End: 2020-05-22
Payer: COMMERCIAL

## 2020-05-22 ENCOUNTER — APPOINTMENT (OUTPATIENT)
Dept: CARDIOLOGY | Facility: CLINIC | Age: 50
End: 2020-05-22

## 2020-05-22 PROCEDURE — 99213 OFFICE O/P EST LOW 20 MIN: CPT | Mod: 95

## 2020-05-22 NOTE — HISTORY OF PRESENT ILLNESS
[FreeTextEntry1] : 51 yo male with pmhx as below is here for a f/up visit\par 9/18 admitted to [facility] with ami\par s/p pci of lad with vivien\par another admission to [facility] 2/19 with ua, s/p cath - 2v cad, s/p pci of lad and a staged pci of lcx at [facility] 5/19\par no cp, valenzuela, fatigue\par no major cvs complains\par et is stable\par complaint with meds and diet

## 2020-05-22 NOTE — PHYSICAL EXAM
[General Appearance - Well Developed] : well developed [Normal Appearance] : normal appearance [Well Groomed] : well groomed [General Appearance - Well Nourished] : well nourished [No Deformities] : no deformities [General Appearance - In No Acute Distress] : no acute distress [Normal Jugular Venous A Waves Present] : normal jugular venous A waves present [Normal Jugular Venous V Waves Present] : normal jugular venous V waves present [No Jugular Venous Finley A Waves] : no jugular venous finley A waves [Skin Color & Pigmentation] : normal skin color and pigmentation [Oriented To Time, Place, And Person] : oriented to person, place, and time

## 2020-05-22 NOTE — ASSESSMENT
[FreeTextEntry1] : 49 yo male with pmhx and presentation as above\par cad/ami/pci of lad\par icm, htn, dm\par cont meds\par med mx for cad\par repeat labs\par f/up with endo for dm\par aggressive risk modif\par diet and act as tolerated\par rtc 2-3 months, stress test next visit\par \par tele health visit\par pt verbally consented to the visit

## 2020-05-24 ENCOUNTER — RX RENEWAL (OUTPATIENT)
Age: 50
End: 2020-05-24

## 2020-06-29 ENCOUNTER — RX RENEWAL (OUTPATIENT)
Age: 50
End: 2020-06-29

## 2020-09-25 ENCOUNTER — APPOINTMENT (OUTPATIENT)
Dept: CARDIOLOGY | Facility: CLINIC | Age: 50
End: 2020-09-25

## 2020-11-14 ENCOUNTER — RX RENEWAL (OUTPATIENT)
Age: 50
End: 2020-11-14

## 2020-11-24 ENCOUNTER — APPOINTMENT (OUTPATIENT)
Dept: CARDIOLOGY | Facility: CLINIC | Age: 50
End: 2020-11-24
Payer: COMMERCIAL

## 2020-11-24 VITALS
DIASTOLIC BLOOD PRESSURE: 65 MMHG | SYSTOLIC BLOOD PRESSURE: 100 MMHG | RESPIRATION RATE: 16 BRPM | WEIGHT: 162 LBS | HEART RATE: 70 BPM | BODY MASS INDEX: 28.7 KG/M2 | OXYGEN SATURATION: 97 % | HEIGHT: 63 IN | TEMPERATURE: 96 F

## 2020-11-24 PROCEDURE — 99214 OFFICE O/P EST MOD 30 MIN: CPT

## 2020-11-24 PROCEDURE — 93000 ELECTROCARDIOGRAM COMPLETE: CPT

## 2020-11-24 RX ORDER — PRASUGREL 10 MG/1
10 TABLET, FILM COATED ORAL DAILY
Qty: 30 | Refills: 3 | Status: DISCONTINUED | COMMUNITY
Start: 2020-02-02 | End: 2020-11-24

## 2020-11-24 NOTE — HISTORY OF PRESENT ILLNESS
[FreeTextEntry1] : 51 yo male with pmhx as below is here for a f/up visit\par 9/18 admitted to [facility] with ami\par s/p pci of lad with vivien\par another admission to [facility] 2/19 with ua, s/p cath - 2v cad, s/p pci of lad and a staged pci of lcx at [facility] 5/19\par no cp, valenzuela, + fatigue\par no major cvs complains\par et is stable\par complaint with meds and diet

## 2020-11-24 NOTE — PHYSICAL EXAM
[General Appearance - Well Developed] : well developed [Normal Appearance] : normal appearance [Well Groomed] : well groomed [General Appearance - Well Nourished] : well nourished [No Deformities] : no deformities [General Appearance - In No Acute Distress] : no acute distress [Normal Oral Mucosa] : normal oral mucosa [No Oral Pallor] : no oral pallor [No Oral Cyanosis] : no oral cyanosis [Normal Jugular Venous A Waves Present] : normal jugular venous A waves present [Normal Jugular Venous V Waves Present] : normal jugular venous V waves present [No Jugular Venous Finley A Waves] : no jugular venous finley A waves [Respiration, Rhythm And Depth] : normal respiratory rhythm and effort [Exaggerated Use Of Accessory Muscles For Inspiration] : no accessory muscle use [Auscultation Breath Sounds / Voice Sounds] : lungs were clear to auscultation bilaterally [Heart Rate And Rhythm] : heart rate and rhythm were normal [Heart Sounds] : normal S1 and S2 [Murmurs] : no murmurs present [Abdomen Soft] : soft [Abdomen Tenderness] : non-tender [Abdomen Mass (___ Cm)] : no abdominal mass palpated [Nail Clubbing] : no clubbing of the fingernails [Cyanosis, Localized] : no localized cyanosis [Petechial Hemorrhages (___cm)] : no petechial hemorrhages [] : no ischemic changes [Skin Color & Pigmentation] : normal skin color and pigmentation [Oriented To Time, Place, And Person] : oriented to person, place, and time

## 2020-11-24 NOTE — ASSESSMENT
[FreeTextEntry1] : 51 yo male with pmhx and presentation as above\par cad/ami/pci of lad\par icm, htn, dm\par may stop effient and zetia\par change bb to 25 qhs as bp is on the lower side\par cont rest of meds\par med mx for cad\par repeat labs reviewed, lipids and a1c ok, h/h noted, ? causes\par f/up with endo for dm\par aggressive risk modif\par diet and act as tolerated\par rtc 3-4 months, stress test next visit
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
## 2021-02-15 ENCOUNTER — RX RENEWAL (OUTPATIENT)
Age: 51
End: 2021-02-15

## 2021-03-11 ENCOUNTER — RX RENEWAL (OUTPATIENT)
Age: 51
End: 2021-03-11

## 2021-03-12 ENCOUNTER — APPOINTMENT (OUTPATIENT)
Dept: PODIATRY | Facility: CLINIC | Age: 51
End: 2021-03-12

## 2021-03-29 ENCOUNTER — RX RENEWAL (OUTPATIENT)
Age: 51
End: 2021-03-29

## 2021-03-31 ENCOUNTER — OUTPATIENT (OUTPATIENT)
Dept: OUTPATIENT SERVICES | Facility: HOSPITAL | Age: 51
LOS: 1 days | Discharge: HOME | End: 2021-03-31

## 2021-03-31 ENCOUNTER — APPOINTMENT (OUTPATIENT)
Dept: ENDOCRINOLOGY | Facility: CLINIC | Age: 51
End: 2021-03-31

## 2021-03-31 VITALS — DIASTOLIC BLOOD PRESSURE: 70 MMHG | SYSTOLIC BLOOD PRESSURE: 110 MMHG

## 2021-03-31 NOTE — PHYSICAL EXAM
[Alert] : alert [Well Nourished] : well nourished [No Acute Distress] : no acute distress [Well Developed] : well developed [Normal Sclera/Conjunctiva] : normal sclera/conjunctiva [EOMI] : extra ocular movement intact [No Proptosis] : no proptosis [Normal Oropharynx] : the oropharynx was normal [Thyroid Not Enlarged] : the thyroid was not enlarged [No Thyroid Nodules] : no palpable thyroid nodules [No Respiratory Distress] : no respiratory distress [No Accessory Muscle Use] : no accessory muscle use [Clear to Auscultation] : lungs were clear to auscultation bilaterally [Normal S1, S2] : normal S1 and S2 [Normal Rate] : heart rate was normal [Regular Rhythm] : with a regular rhythm [No Edema] : no peripheral edema [Normal Bowel Sounds] : normal bowel sounds [Pedal Pulses Normal] : the pedal pulses are present [Not Tender] : non-tender [Not Distended] : not distended [Soft] : abdomen soft [Normal Posterior Cervical Nodes] : no posterior cervical lymphadenopathy [Normal Anterior Cervical Nodes] : no anterior cervical lymphadenopathy [No Spinal Tenderness] : no spinal tenderness [Spine Straight] : spine straight [No Stigmata of Cushings Syndrome] : no stigmata of Cushings Syndrome [Normal Gait] : normal gait [Normal Strength/Tone] : muscle strength and tone were normal [No Rash] : no rash [Acanthosis Nigricans] : no acanthosis nigricans [Normal Reflexes] : deep tendon reflexes were 2+ and symmetric [No Tremors] : no tremors [Oriented x3] : oriented to person, place, and time

## 2021-03-31 NOTE — ASSESSMENT
[FreeTextEntry1] : continue current meds, patient has stopped some medications including ezetimibe, no repeat lipid profile done. [Diabetes Foot Care] : diabetes foot care [Long Term Vascular Complications] : long term vascular complications of diabetes [Carbohydrate Consistent Diet] : carbohydrate consistent diet [Importance of Diet and Exercise] : importance of diet and exercise to improve glycemic control, achieve weight loss and improve cardiovascular health [Retinopathy Screening] : Patient was referred to ophthalmology for retinopathy screening

## 2021-03-31 NOTE — HISTORY OF PRESENT ILLNESS
[FreeTextEntry1] : patient has generalized aches and pains all over body, and bilateral shoulder tendinitis, and is seeing orthopedic and other specialists for it.

## 2021-04-05 DIAGNOSIS — E11.40 TYPE 2 DIABETES MELLITUS WITH DIABETIC NEUROPATHY, UNSPECIFIED: ICD-10-CM

## 2021-04-05 DIAGNOSIS — E11.59 TYPE 2 DIABETES MELLITUS WITH OTHER CIRCULATORY COMPLICATIONS: ICD-10-CM

## 2021-04-05 DIAGNOSIS — E11.9 TYPE 2 DIABETES MELLITUS WITHOUT COMPLICATIONS: ICD-10-CM

## 2021-04-06 ENCOUNTER — APPOINTMENT (OUTPATIENT)
Dept: PODIATRY | Facility: CLINIC | Age: 51
End: 2021-04-06
Payer: COMMERCIAL

## 2021-04-06 ENCOUNTER — OUTPATIENT (OUTPATIENT)
Dept: OUTPATIENT SERVICES | Facility: HOSPITAL | Age: 51
LOS: 1 days | Discharge: HOME | End: 2021-04-06

## 2021-04-06 PROCEDURE — 99213 OFFICE O/P EST LOW 20 MIN: CPT

## 2021-04-06 NOTE — PROCEDURE
[FreeTextEntry1] : -Loss of protective sensation: no\par -Presence of foot deformity:    no \par -presence of pre-ulcerative lesion:  no\par   -hemorrhage into callus: no\par -atrophy of heel or metatarsal fat pads: no\par \par -Diabetic neurovascular foot assessment  performed. \par -Discussed with patient diabetic foot hygiene.Patient instructed to regularly check the bottom of the feet\par -Patient given a diabetic foot education sheet\par -refill ammonium lactate \par -Return 12  month

## 2021-04-06 NOTE — END OF VISIT
[] : Resident [FreeTextEntry3] : diabetic neurovascular exam performed\par renewed ammonium lactate

## 2021-04-06 NOTE — HISTORY OF PRESENT ILLNESS
[FreeTextEntry1] : \par \par 50 year old M  returns for a diabetic foot evaluation. Mr. ZAFAR relates  burning in his feet. Last A1c was 7.2%.11/2020\par

## 2021-06-19 NOTE — HISTORY OF PRESENT ILLNESS
[FreeTextEntry1] : 48M arrives to clin for initial Dm foot evaluation. Pt was referred to podiatry clinic by Endo () due to pts elevated HBA1C of 10.4%. Pt states he has numbness/tingling sensation in b/l LE and dermatological changes to b/l LE such as hair loss to legs and wounds that have difficulty healing. No other pedal complaints at this time. Denies n/v/f/c/sob no

## 2021-06-23 LAB — ERYTHROCYTE [SEDIMENTATION RATE] IN BLOOD BY WESTERGREN METHOD: 1 MM/HR

## 2021-06-24 LAB
ALBUMIN SERPL ELPH-MCNC: 4.4 G/DL
ALP BLD-CCNC: 69 U/L
ALT SERPL-CCNC: 13 U/L
ANION GAP SERPL CALC-SCNC: 17 MMOL/L
AST SERPL-CCNC: 17 U/L
BASOPHILS # BLD AUTO: 0.06 K/UL
BASOPHILS NFR BLD AUTO: 0.7 %
BILIRUB SERPL-MCNC: 0.8 MG/DL
BUN SERPL-MCNC: 12 MG/DL
CALCIUM SERPL-MCNC: 9.6 MG/DL
CHLORIDE SERPL-SCNC: 101 MMOL/L
CHOLEST SERPL-MCNC: 166 MG/DL
CK SERPL-CCNC: 49 U/L
CO2 SERPL-SCNC: 22 MMOL/L
CREAT SERPL-MCNC: 1 MG/DL
EOSINOPHIL # BLD AUTO: 0.19 K/UL
EOSINOPHIL NFR BLD AUTO: 2.1 %
ESTIMATED AVERAGE GLUCOSE: 180 MG/DL
GLUCOSE SERPL-MCNC: 148 MG/DL
HBA1C MFR BLD HPLC: 7.9 %
HCT VFR BLD CALC: 50.7 %
HDLC SERPL-MCNC: 34 MG/DL
HGB BLD-MCNC: 17.4 G/DL
IMM GRANULOCYTES NFR BLD AUTO: 0.3 %
LDLC SERPL CALC-MCNC: 105 MG/DL
LYMPHOCYTES # BLD AUTO: 2.78 K/UL
LYMPHOCYTES NFR BLD AUTO: 30.2 %
MAN DIFF?: NORMAL
MCHC RBC-ENTMCNC: 29.6 PG
MCHC RBC-ENTMCNC: 34.3 G/DL
MCV RBC AUTO: 86.4 FL
MONOCYTES # BLD AUTO: 0.59 K/UL
MONOCYTES NFR BLD AUTO: 6.4 %
NEUTROPHILS # BLD AUTO: 5.56 K/UL
NEUTROPHILS NFR BLD AUTO: 60.3 %
NONHDLC SERPL-MCNC: 132 MG/DL
PLATELET # BLD AUTO: 222 K/UL
POTASSIUM SERPL-SCNC: 4.7 MMOL/L
PROT SERPL-MCNC: 7 G/DL
RBC # BLD: 5.87 M/UL
RBC # FLD: 14.7 %
SODIUM SERPL-SCNC: 140 MMOL/L
TRIGL SERPL-MCNC: 225 MG/DL
WBC # FLD AUTO: 9.21 K/UL

## 2021-06-30 ENCOUNTER — APPOINTMENT (OUTPATIENT)
Dept: ENDOCRINOLOGY | Facility: CLINIC | Age: 51
End: 2021-06-30

## 2021-06-30 ENCOUNTER — OUTPATIENT (OUTPATIENT)
Dept: OUTPATIENT SERVICES | Facility: HOSPITAL | Age: 51
LOS: 1 days | Discharge: HOME | End: 2021-06-30

## 2021-06-30 ENCOUNTER — NON-APPOINTMENT (OUTPATIENT)
Age: 51
End: 2021-06-30

## 2021-06-30 NOTE — END OF VISIT
[] : Resident [FreeTextEntry3] : Assessment written by me  [Time Spent: ___ minutes] : I have spent [unfilled] minutes of time on the encounter.

## 2021-06-30 NOTE — ASSESSMENT
[Diabetes Foot Care] : diabetes foot care [Long Term Vascular Complications] : long term vascular complications of diabetes [Carbohydrate Consistent Diet] : carbohydrate consistent diet [Importance of Diet and Exercise] : importance of diet and exercise to improve glycemic control, achieve weight loss and improve cardiovascular health [Retinopathy Screening] : Patient was referred to ophthalmology for retinopathy screening [FreeTextEntry1] : MANN ZAFAR is a 50 year old male being seen for a follow-up visit for DM Type 2 and hyperlipidemia. \par \par \par #type 2 DM \par HBA1c 7.9% on Synjardy and pioglitazone \par - discussed adding trulicity but patient prefer not to as when he was on it he lost his appetite \par - believes metformin and jardiance separate was more helpful but also prefer to stay on less pills \par - advised to continue same regimen for now , if HBA1c higher will re discuss  using Glp1 agonist\par \par DL : poorly controlled on pravastatin and ezetimibe ? compliance \par recommended to switch to crestor but patient would like to discuss it with cardiologist first  [Self Monitoring of Blood Glucose] : self monitoring of blood glucose

## 2021-06-30 NOTE — REVIEW OF SYSTEMS
[Visual Field Defect] : visual field defect [Blurred Vision] : blurred vision [Joint Pain] : joint pain [As Noted in HPI] : as noted in HPI [Fatigue] : no fatigue [Decreased Appetite] : appetite not decreased [Recent Weight Gain (___ Lbs)] : no recent weight gain [Recent Weight Loss (___ Lbs)] : no recent weight loss [Shortness Of Breath] : no shortness of breath [Wheezing] : no wheezing [SOB on Exertion] : no shortness of breath on exertion [Nausea] : no nausea [Constipation] : no constipation [Vomiting] : no vomiting [Diarrhea] : no diarrhea [Headaches] : no headaches [Tremors] : no tremors

## 2021-06-30 NOTE — HISTORY OF PRESENT ILLNESS
[FreeTextEntry1] : MANN ZAFAR is a 50 year old male being seen for a follow-up visit for DM Type 2 and hyperlipidemia. \par Pt  endorses not eating so well, eating lots of fried food.\par Pt endorses not seeing well in his right eye, sees floaters, went to optho and has bleeding in the back of eye as per pt, optho did diabetic laser eye treatment, pt still having floaters \par Sees podiatry regularly. \par Otherwise, no complaints.

## 2021-06-30 NOTE — PHYSICAL EXAM
[Alert] : alert [Well Nourished] : well nourished [No Acute Distress] : no acute distress [EOMI] : extra ocular movement intact [Normal S1, S2] : normal S1 and S2 [Normal Rate] : heart rate was normal [Regular Rhythm] : with a regular rhythm [No Proptosis] : no proptosis [No Lid Lag] : no lid lag [Thyroid Not Enlarged] : the thyroid was not enlarged [No Thyroid Nodules] : no palpable thyroid nodules [Clear to Auscultation] : lungs were clear to auscultation bilaterally [No Stigmata of Cushings Syndrome] : no stigmata of Cushings Syndrome [Acanthosis Nigricans] : acanthosis nigricans present [Normal Reflexes] : deep tendon reflexes were 2+ and symmetric [No Tremors] : no tremors [Oriented x3] : oriented to person, place, and time [Abdominal Striae] : no abdominal striae

## 2021-07-02 ENCOUNTER — APPOINTMENT (OUTPATIENT)
Dept: CARDIOLOGY | Facility: CLINIC | Age: 51
End: 2021-07-02
Payer: COMMERCIAL

## 2021-07-02 VITALS
WEIGHT: 162 LBS | BODY MASS INDEX: 28.7 KG/M2 | TEMPERATURE: 97.2 F | HEIGHT: 63 IN | SYSTOLIC BLOOD PRESSURE: 106 MMHG | DIASTOLIC BLOOD PRESSURE: 74 MMHG

## 2021-07-02 PROCEDURE — 93000 ELECTROCARDIOGRAM COMPLETE: CPT

## 2021-07-02 PROCEDURE — 99214 OFFICE O/P EST MOD 30 MIN: CPT

## 2021-07-02 NOTE — HISTORY OF PRESENT ILLNESS
[FreeTextEntry1] : 52 yo male with pmhx as below is here for a f/up visit\par 9/18 admitted to [facility] with ami\par s/p pci of lad with vivein\par another admission to [facility] 2/19 with ua, s/p cath - 2v cad, s/p pci of lad and a staged pci of lcx at [facility] 5/19\par no cp, valenzuela, + fatigue\par no major cvs complains\par et is stable\par complaint with meds, not diet

## 2021-07-02 NOTE — ASSESSMENT
[FreeTextEntry1] : 52 yo male with pmhx and presentation as above\par cad/ami/pci of lad\par icm, htn, dm\par resume zetia as lipid panel not at goal - did not tolerate lipitor or crestor in the past\par cont bb at night\par cont rest of meds\par med mx for cad\par repeat labs reviewed, lipids and a1c not at gal\par f/up with endo for dm mx\par aggressive risk modif\par diet and act as tolerated\par rtc 3-4 months, stress test next visit

## 2021-09-07 ENCOUNTER — APPOINTMENT (OUTPATIENT)
Dept: VASCULAR SURGERY | Facility: CLINIC | Age: 51
End: 2021-09-07

## 2021-09-10 ENCOUNTER — OUTPATIENT (OUTPATIENT)
Dept: OUTPATIENT SERVICES | Facility: HOSPITAL | Age: 51
LOS: 1 days | Discharge: HOME | End: 2021-09-10
Payer: MEDICAID

## 2021-09-10 DIAGNOSIS — I65.29 OCCLUSION AND STENOSIS OF UNSPECIFIED CAROTID ARTERY: ICD-10-CM

## 2021-09-10 PROCEDURE — 93880 EXTRACRANIAL BILAT STUDY: CPT | Mod: 26

## 2021-10-13 ENCOUNTER — APPOINTMENT (OUTPATIENT)
Dept: ENDOCRINOLOGY | Facility: CLINIC | Age: 51
End: 2021-10-13

## 2021-10-13 ENCOUNTER — OUTPATIENT (OUTPATIENT)
Dept: OUTPATIENT SERVICES | Facility: HOSPITAL | Age: 51
LOS: 1 days | Discharge: HOME | End: 2021-10-13

## 2021-10-13 VITALS — HEIGHT: 63 IN | BODY MASS INDEX: 30.12 KG/M2 | WEIGHT: 170 LBS

## 2021-10-13 RX ORDER — BLOOD SUGAR DIAGNOSTIC
STRIP MISCELLANEOUS
Qty: 100 | Refills: 3 | Status: ACTIVE | COMMUNITY
Start: 2021-10-13 | End: 1900-01-01

## 2021-10-13 NOTE — PHYSICAL EXAM
[No Acute Distress] : no acute distress [Well Nourished] : well nourished [Normal Sclera/Conjunctiva] : normal sclera/conjunctiva [EOMI] : extraocular movements intact [Normal Outer Ear/Nose] : the outer ears and nose were normal in appearance [No JVD] : no jugular venous distention [No Lymphadenopathy] : no lymphadenopathy [No Respiratory Distress] : no respiratory distress  [No Accessory Muscle Use] : no accessory muscle use [Normal Rate] : normal rate  [No Carotid Bruits] : no carotid bruits [No Edema] : there was no peripheral edema [No CVA Tenderness] : no CVA  tenderness [No Joint Swelling] : no joint swelling [No Rash] : no rash [Coordination Grossly Intact] : coordination grossly intact [No Focal Deficits] : no focal deficits

## 2021-10-13 NOTE — END OF VISIT
[] : Resident [FreeTextEntry3] : type 2 dm with worsening retinopathy \par - started by PCP on Lantus on top of synjardy , doing ok except for some hypoglycemic episodes \par - advised to titrate dose down by 2 units if continue to have hypoglycemia \par - f/u opthalmology and cardiology \par - do labs now  [Time Spent: ___ minutes] : I have spent [unfilled] minutes of time on the encounter.

## 2021-10-13 NOTE — REVIEW OF SYSTEMS
[Vision Problems] : vision problems [Fever] : no fever [Night Sweats] : no night sweats [Pain] : no pain [Itching] : no itching [Earache] : no earache [Nasal Discharge] : no nasal discharge [Chest Pain] : no chest pain [Palpitations] : no palpitations [Orthopena] : no orthopnea [Shortness Of Breath] : no shortness of breath [Wheezing] : no wheezing [Abdominal Pain] : no abdominal pain [Nausea] : no nausea [Vomiting] : no vomiting [Heartburn] : no heartburn

## 2021-10-13 NOTE — HISTORY OF PRESENT ILLNESS
[de-identified] : MANN ZAFAR is a 50 year old male being seen for a 3 months follow-up visit for DM Type 2 and hyperlipidemia. \par Pt endorses not eating well.\par Pt endorses not seeing in his right eye --> diabetic retinopathy, with retinal detachment with a procedure planned with his ophthalmologist in [place] tomorrow.\par His FBS  has been ranging between 100-200. This morning his FBS is 170.\par Added lantus 10-15 in the AM recently as per Dr Segura\par Sees podiatry regularly. \par Otherwise, no complaints.

## 2021-10-13 NOTE — ASSESSMENT
[FreeTextEntry1] : #type 2 DM \par HBA1c 7.9% from July 2021on Synjardy and pioglitazone and lantus\par - advised to continue same regimen for now , titrate dose of Lantus up or down by 2 units based on am FS \par \par DL : poorly controlled on pravastatin and ezetimibe ? compliance reinforced \par recommended to switch to crestor but patient would like to discuss it with cardiologist first. \par \par \par Diabetes Counseling: The patient was counseled on diabetes foot care, long term vascular complications of diabetes, carbohydrate consistent diet, importance of diet and exercise to improve glycemic control, achieve weight loss and improve cardiovascular health and self monitoring of blood glucose. Patient was referred to ophthalmology for retinopathy screening. \par

## 2021-10-14 ENCOUNTER — APPOINTMENT (OUTPATIENT)
Dept: CARDIOLOGY | Facility: CLINIC | Age: 51
End: 2021-10-14
Payer: COMMERCIAL

## 2021-10-14 PROCEDURE — 93325 DOPPLER ECHO COLOR FLOW MAPG: CPT

## 2021-10-14 PROCEDURE — 93320 DOPPLER ECHO COMPLETE: CPT

## 2021-10-14 PROCEDURE — 93351 STRESS TTE COMPLETE: CPT

## 2021-10-15 LAB
ALBUMIN SERPL ELPH-MCNC: 4.7 G/DL
ALP BLD-CCNC: 66 U/L
ALT SERPL-CCNC: 15 U/L
ANION GAP SERPL CALC-SCNC: 20 MMOL/L
AST SERPL-CCNC: 16 U/L
BASOPHILS # BLD AUTO: 0.05 K/UL
BASOPHILS NFR BLD AUTO: 0.6 %
BILIRUB SERPL-MCNC: 0.5 MG/DL
BUN SERPL-MCNC: 17 MG/DL
CALCIUM SERPL-MCNC: 9.7 MG/DL
CHLORIDE SERPL-SCNC: 100 MMOL/L
CHOLEST SERPL-MCNC: 146 MG/DL
CO2 SERPL-SCNC: 19 MMOL/L
CREAT SERPL-MCNC: 1.2 MG/DL
CREAT SPEC-SCNC: 110 MG/DL
EOSINOPHIL # BLD AUTO: 0.19 K/UL
EOSINOPHIL NFR BLD AUTO: 2.3 %
ESTIMATED AVERAGE GLUCOSE: 146 MG/DL
GLUCOSE SERPL-MCNC: 118 MG/DL
HBA1C MFR BLD HPLC: 6.7 %
HCT VFR BLD CALC: 49.9 %
HDLC SERPL-MCNC: 36 MG/DL
HGB BLD-MCNC: 17.2 G/DL
IMM GRANULOCYTES NFR BLD AUTO: 0.4 %
LDLC SERPL CALC-MCNC: 86 MG/DL
LYMPHOCYTES # BLD AUTO: 3.14 K/UL
LYMPHOCYTES NFR BLD AUTO: 37.6 %
MAN DIFF?: NORMAL
MCHC RBC-ENTMCNC: 29.5 PG
MCHC RBC-ENTMCNC: 34.5 G/DL
MCV RBC AUTO: 85.6 FL
MICROALBUMIN 24H UR DL<=1MG/L-MCNC: 3.3 MG/DL
MICROALBUMIN/CREAT 24H UR-RTO: 30 MG/G
MONOCYTES # BLD AUTO: 0.57 K/UL
MONOCYTES NFR BLD AUTO: 6.8 %
NEUTROPHILS # BLD AUTO: 4.38 K/UL
NEUTROPHILS NFR BLD AUTO: 52.3 %
NONHDLC SERPL-MCNC: 110 MG/DL
PLATELET # BLD AUTO: 240 K/UL
POTASSIUM SERPL-SCNC: 4.4 MMOL/L
PROT SERPL-MCNC: 6.9 G/DL
RBC # BLD: 5.83 M/UL
RBC # FLD: 14.9 %
SODIUM SERPL-SCNC: 139 MMOL/L
TRIGL SERPL-MCNC: 182 MG/DL
TSH SERPL-ACNC: 2.95 UIU/ML
WBC # FLD AUTO: 8.36 K/UL

## 2021-11-04 ENCOUNTER — RX RENEWAL (OUTPATIENT)
Age: 51
End: 2021-11-04

## 2021-11-12 ENCOUNTER — APPOINTMENT (OUTPATIENT)
Dept: CARDIOLOGY | Facility: CLINIC | Age: 51
End: 2021-11-12
Payer: COMMERCIAL

## 2021-11-12 VITALS
SYSTOLIC BLOOD PRESSURE: 102 MMHG | HEART RATE: 77 BPM | HEIGHT: 63 IN | BODY MASS INDEX: 29.06 KG/M2 | TEMPERATURE: 97.5 F | DIASTOLIC BLOOD PRESSURE: 70 MMHG | WEIGHT: 164 LBS

## 2021-11-12 PROCEDURE — 93000 ELECTROCARDIOGRAM COMPLETE: CPT

## 2021-11-12 PROCEDURE — 99214 OFFICE O/P EST MOD 30 MIN: CPT

## 2021-11-12 NOTE — ASSESSMENT
[FreeTextEntry1] : 50 yo male with pmhx and presentation as above\par cad/ami/pci of lad\par icm, htn, dm\par cont statin and zetia as lipid panel much better, however, pt has mylgia - did not tolerate lipitor or crestor in the past, will request for repatha\par cont bb at night\par cont rest of meds\par med mx for cad\par repeat labs reviewed, lipids and a1c better\par stress echo low risk at high workload\par f/up with endo for dm mx\par aggressive risk modif\par diet and act as tolerated\par rtc 3-4 months

## 2021-11-12 NOTE — HISTORY OF PRESENT ILLNESS
[FreeTextEntry1] : 52 yo male with pmhx as below is here for a f/up visit\par 9/18 admitted to [facility] with ami\par s/p pci of lad with vivien\par another admission to [facility] 2/19 with ua, s/p cath - 2v cad, s/p pci of lad and a staged pci of lcx at [facility] 5/19\par no cp, valenzuela,palp\par no major cvs complains\par et is stable\par s/p stress echo/labs\par complaint with meds, not diet

## 2021-11-22 RX ORDER — EVOLOCUMAB 420 MG/3.5
420 KIT SUBCUTANEOUS
Qty: 4 | Refills: 3 | Status: DISCONTINUED | COMMUNITY
Start: 2021-11-12 | End: 2021-11-22

## 2021-12-07 ENCOUNTER — RX RENEWAL (OUTPATIENT)
Age: 51
End: 2021-12-07

## 2021-12-15 ENCOUNTER — RX RENEWAL (OUTPATIENT)
Age: 51
End: 2021-12-15

## 2021-12-17 NOTE — PATIENT PROFILE ADULT - NSPROHMDIABETHBA1C_GEN_A_NUR
Airway       Patient location during procedure: OR       Procedure Start/Stop Times: 12/17/2021 7:54 AM  Staff -        Performed By: anesthesiologist and CRNA  Consent for Airway        Urgency: elective  Indications and Patient Condition       Indications for airway management: emile-procedural       Induction type:intravenous       Mask difficulty assessment: 2 - vent by mask + OA or adjuvant +/- NMBA    Final Airway Details       Final airway type: endotracheal airway       Successful airway: ETT - single  Endotracheal Airway Details        ETT size (mm): 8.0       Cuffed: yes       Successful intubation technique: video laryngoscopy       VL Blade Size: Glidescope 4       Grade View of Cords: 1       Adjucts: stylet       Position: Right       Measured from: gums/teeth       Secured at (cm): 24       Bite block used: None    Post intubation assessment        Placement verified by: capnometry, equal breath sounds and chest rise        Number of attempts at approach: 1       Number of other approaches attempted: 0       Secured with: silk tape       Ease of procedure: easy       Dentition: Intact and Unchanged          
unknown

## 2022-02-23 ENCOUNTER — APPOINTMENT (OUTPATIENT)
Dept: ENDOCRINOLOGY | Facility: CLINIC | Age: 52
End: 2022-02-23

## 2022-03-18 ENCOUNTER — APPOINTMENT (OUTPATIENT)
Dept: CARDIOLOGY | Facility: CLINIC | Age: 52
End: 2022-03-18
Payer: COMMERCIAL

## 2022-03-18 VITALS
DIASTOLIC BLOOD PRESSURE: 66 MMHG | HEART RATE: 80 BPM | SYSTOLIC BLOOD PRESSURE: 112 MMHG | WEIGHT: 167 LBS | TEMPERATURE: 97.1 F | HEIGHT: 63 IN | BODY MASS INDEX: 29.59 KG/M2

## 2022-03-18 PROCEDURE — 99214 OFFICE O/P EST MOD 30 MIN: CPT

## 2022-03-18 PROCEDURE — 93000 ELECTROCARDIOGRAM COMPLETE: CPT

## 2022-03-18 NOTE — ASSESSMENT
[FreeTextEntry1] : 50 yo male with pmhx and presentation as above\par cad/ami/pci of lad\par icm, htn, dm\par cont statin and zetia and pravastatin pt has mylgia - did not tolerate lipitor or crestor\par cont bb at night\par cont rest of meds\par med mx for cad\par repeat labs\par 11/21 stress echo low risk at high workload\par f/up with endo for dm mx\par aggressive risk modif\par diet and act as tolerated\par rtc 4-5 months

## 2022-03-18 NOTE — HISTORY OF PRESENT ILLNESS
[FreeTextEntry1] : 50 yo male with pmhx as below is here for a f/up visit\par 9/18 admitted to [facility] with ami\par s/p pci of lad with vivien\par another admission to [facility] 2/19 with ua, s/p cath - 2v cad, s/p pci of lad and a staged pci of lcx at [facility] 5/19\par no cp, valenzuela,palp\par no major cvs complains\par et is stable\par lost vision in the right eye, having laser sx done on the left\par complaint with meds and diet

## 2022-03-24 LAB
ALBUMIN SERPL ELPH-MCNC: 4.7 G/DL
ALP BLD-CCNC: 66 U/L
ALT SERPL-CCNC: 18 U/L
ANION GAP SERPL CALC-SCNC: 17 MMOL/L
AST SERPL-CCNC: 19 U/L
BASOPHILS # BLD AUTO: 0.07 K/UL
BASOPHILS NFR BLD AUTO: 0.8 %
BILIRUB SERPL-MCNC: 0.5 MG/DL
BUN SERPL-MCNC: 13 MG/DL
CALCIUM SERPL-MCNC: 9.8 MG/DL
CHLORIDE SERPL-SCNC: 100 MMOL/L
CHOLEST SERPL-MCNC: 162 MG/DL
CK SERPL-CCNC: 62 U/L
CO2 SERPL-SCNC: 21 MMOL/L
CREAT SERPL-MCNC: 1 MG/DL
EGFR: 91 ML/MIN/1.73M2
EOSINOPHIL # BLD AUTO: 0.16 K/UL
EOSINOPHIL NFR BLD AUTO: 1.8 %
ESTIMATED AVERAGE GLUCOSE: 180 MG/DL
GLUCOSE SERPL-MCNC: 215 MG/DL
HBA1C MFR BLD HPLC: 7.9 %
HCT VFR BLD CALC: 48.7 %
HDLC SERPL-MCNC: 35 MG/DL
HGB BLD-MCNC: 16.8 G/DL
IMM GRANULOCYTES NFR BLD AUTO: 0.4 %
LDLC SERPL CALC-MCNC: 99 MG/DL
LYMPHOCYTES # BLD AUTO: 2.6 K/UL
LYMPHOCYTES NFR BLD AUTO: 29.2 %
MAN DIFF?: NORMAL
MCHC RBC-ENTMCNC: 30.1 PG
MCHC RBC-ENTMCNC: 34.5 G/DL
MCV RBC AUTO: 87.1 FL
MONOCYTES # BLD AUTO: 0.55 K/UL
MONOCYTES NFR BLD AUTO: 6.2 %
NEUTROPHILS # BLD AUTO: 5.48 K/UL
NEUTROPHILS NFR BLD AUTO: 61.6 %
NONHDLC SERPL-MCNC: 127 MG/DL
PLATELET # BLD AUTO: 258 K/UL
POTASSIUM SERPL-SCNC: 4.4 MMOL/L
PROT SERPL-MCNC: 6.9 G/DL
RBC # BLD: 5.59 M/UL
RBC # FLD: 14.6 %
SODIUM SERPL-SCNC: 138 MMOL/L
TRIGL SERPL-MCNC: 196 MG/DL
TSH SERPL-ACNC: 1.51 UIU/ML
WBC # FLD AUTO: 8.9 K/UL

## 2022-04-05 ENCOUNTER — APPOINTMENT (OUTPATIENT)
Dept: PODIATRY | Facility: CLINIC | Age: 52
End: 2022-04-05

## 2022-05-04 ENCOUNTER — RX RENEWAL (OUTPATIENT)
Age: 52
End: 2022-05-04

## 2022-09-22 ENCOUNTER — NON-APPOINTMENT (OUTPATIENT)
Age: 52
End: 2022-09-22

## 2022-09-29 NOTE — ED ADULT NURSE NOTE - PRO INTERPRETER NEED 2
----- Message from Spring Bashir, Patient Care Assistant sent at 9/29/2022  3:41 PM CDT -----  Regarding: medication  Contact: pt  Type: Needs Medical Advice  Who Called:  pt   Pharmacy name and phone #:    [id number] - [place], [place] - [address]  [address]  [facility] [address]  Phone: [phone] Fax: [phone]    Best Call Back Number:    Additional Information: pt states she would like a callback regarding dextroamphetamine-amphetamine (ADDERALL) 30 mg Tab being refilled early. Please call pt to advise. Thanks!             English

## 2022-10-14 ENCOUNTER — APPOINTMENT (OUTPATIENT)
Dept: CARDIOLOGY | Facility: CLINIC | Age: 52
End: 2022-10-14

## 2022-10-14 VITALS
HEART RATE: 75 BPM | BODY MASS INDEX: 27.46 KG/M2 | SYSTOLIC BLOOD PRESSURE: 116 MMHG | DIASTOLIC BLOOD PRESSURE: 74 MMHG | HEIGHT: 63 IN | WEIGHT: 155 LBS

## 2022-10-14 LAB
ALBUMIN SERPL ELPH-MCNC: 4.8 G/DL
ALP BLD-CCNC: 65 U/L
ALT SERPL-CCNC: 14 U/L
ANION GAP SERPL CALC-SCNC: 14 MMOL/L
AST SERPL-CCNC: 16 U/L
BASOPHILS # BLD AUTO: 0.06 K/UL
BASOPHILS NFR BLD AUTO: 0.7 %
BILIRUB SERPL-MCNC: 0.7 MG/DL
BUN SERPL-MCNC: 19 MG/DL
CALCIUM SERPL-MCNC: 9.3 MG/DL
CHLORIDE SERPL-SCNC: 101 MMOL/L
CHOLEST SERPL-MCNC: 163 MG/DL
CK SERPL-CCNC: 57 U/L
CO2 SERPL-SCNC: 22 MMOL/L
CREAT SERPL-MCNC: 0.9 MG/DL
EGFR: 103 ML/MIN/1.73M2
EOSINOPHIL # BLD AUTO: 0.21 K/UL
EOSINOPHIL NFR BLD AUTO: 2.5 %
ESTIMATED AVERAGE GLUCOSE: 163 MG/DL
GLUCOSE SERPL-MCNC: 120 MG/DL
HBA1C MFR BLD HPLC: 7.3 %
HCT VFR BLD CALC: 49.2 %
HDLC SERPL-MCNC: 34 MG/DL
HGB BLD-MCNC: 16.8 G/DL
IMM GRANULOCYTES NFR BLD AUTO: 0.5 %
LDLC SERPL CALC-MCNC: 98 MG/DL
LYMPHOCYTES # BLD AUTO: 2.89 K/UL
LYMPHOCYTES NFR BLD AUTO: 34 %
MAN DIFF?: NORMAL
MCHC RBC-ENTMCNC: 29.4 PG
MCHC RBC-ENTMCNC: 34.1 G/DL
MCV RBC AUTO: 86 FL
MONOCYTES # BLD AUTO: 0.58 K/UL
MONOCYTES NFR BLD AUTO: 6.8 %
NEUTROPHILS # BLD AUTO: 4.73 K/UL
NEUTROPHILS NFR BLD AUTO: 55.5 %
NONHDLC SERPL-MCNC: 129 MG/DL
PLATELET # BLD AUTO: 239 K/UL
POTASSIUM SERPL-SCNC: 4.5 MMOL/L
PROT SERPL-MCNC: 6.9 G/DL
RBC # BLD: 5.72 M/UL
RBC # FLD: 15.9 %
SODIUM SERPL-SCNC: 137 MMOL/L
TRIGL SERPL-MCNC: 156 MG/DL
TSH SERPL-ACNC: 1.54 UIU/ML
WBC # FLD AUTO: 8.51 K/UL

## 2022-10-14 PROCEDURE — 93000 ELECTROCARDIOGRAM COMPLETE: CPT

## 2022-10-14 PROCEDURE — 99214 OFFICE O/P EST MOD 30 MIN: CPT | Mod: 25

## 2022-10-14 NOTE — HISTORY OF PRESENT ILLNESS
[FreeTextEntry1] : 51 yo male with pmhx as below is here for a f/up visit\par 9/18 admitted to [facility] with ami\par s/p pci of lad with vivien\par another admission to [facility] 2/19 with ua, s/p cath - 2v cad, s/p pci of lad and a staged pci of lcx at [facility] 5/19\par no cp, valenzuela,palp\par no major cvs complains\par et is stable\par lost vision in the right eye, having laser sx done on the left\par complaint with meds and diet

## 2022-10-14 NOTE — ASSESSMENT
[FreeTextEntry1] : 53 yo male with pmhx and presentation as above\par cad/ami/pci of lad\par icm, htn, dm\par cont statin and zetia and pravastatin pt has mylgia - did not tolerate lipitor or crestor\par cont bb at night\par cont rest of meds\par med mx for cad\par repeat labs reviewed, all at goal, will try to get a1c < 7\par 11/21 stress echo low risk at high workload\par f/up with endo for dm mx\par aggressive risk modif\par diet and act as tolerated\par rtc 6 months

## 2022-12-12 ENCOUNTER — RX RENEWAL (OUTPATIENT)
Age: 52
End: 2022-12-12

## 2023-01-11 ENCOUNTER — NON-APPOINTMENT (OUTPATIENT)
Age: 53
End: 2023-01-11

## 2023-01-19 NOTE — DISCHARGE NOTE ADULT - FUNCTIONAL SCREEN CURRENT LEVEL: AMBULATION, MLM
Mom called because Ildefonso started vomiting last night and had very little appetite. This morning, he no longer has an appetite and is still vomiting and diarrhea as well. Mom is wondering what she can all do for him.    **Please read encounter for Ildefonso's brother, Eusebio**   0 = independent

## 2023-01-27 ENCOUNTER — APPOINTMENT (OUTPATIENT)
Dept: PODIATRY | Facility: CLINIC | Age: 53
End: 2023-01-27
Payer: COMMERCIAL

## 2023-01-27 ENCOUNTER — OUTPATIENT (OUTPATIENT)
Dept: OUTPATIENT SERVICES | Facility: HOSPITAL | Age: 53
LOS: 1 days | Discharge: HOME | End: 2023-01-27

## 2023-01-27 DIAGNOSIS — L85.3 XEROSIS CUTIS: ICD-10-CM

## 2023-01-27 PROCEDURE — 99214 OFFICE O/P EST MOD 30 MIN: CPT

## 2023-01-27 NOTE — PHYSICAL EXAM
[General Appearance - Alert] : alert [General Appearance - In No Acute Distress] : in no acute distress [1+] : left foot dorsalis pedis 1+ [No Joint Swelling] : no joint swelling [Vibration Dec.] : diminished vibratory sensation at the level of the toes [Oriented To Time, Place, And Person] : oriented to person, place, and time [Impaired Insight] : insight and judgment were intact [Foot Ulcer] : no foot ulcer [FreeTextEntry1] : minimal scaling on plantar surface of both feet  [Diminished Throughout Right Foot] : normal sensation with monofilament testing throughout right foot [Diminished Throughout Left Foot] : normal sensation with monofilament testing throughout left foot

## 2023-01-27 NOTE — HISTORY OF PRESENT ILLNESS
[FreeTextEntry1] : 52 year old M  presents for a diabetic foot evaluation. Mr. ZAFAR  relates tingling in his right first toe. Complaining that his toes always feel cold. . Last A1c was 7.3% 10/2022. Pt also requests a refill of ammonium lactate \par

## 2023-01-27 NOTE — REVIEW OF SYSTEMS
[As noted in HPI] : as noted in HPI [As Noted in HPI] : as noted in HPI [Negative] : Musculoskeletal [Dry Skin] : no dry skin

## 2023-02-22 NOTE — PHYSICAL EXAM
[General Appearance - Alert] : alert [General Appearance - In No Acute Distress] : in no acute distress [Sclera] : the sclera and conjunctiva were normal [PERRL With Normal Accommodation] : pupils were equal in size, round, and reactive to light [Vibration Dec.] : diminished vibratory sensation at the level of the toes [Oriented To Time, Place, And Person] : oriented to person, place, and time [Diminished Throughout Right Foot] : normal tactile sensation with monofilament testing throughout right foot [Diminished Throughout Left Foot] : normal tactile sensation with monofilament testing throughout left foot [FreeTextEntry1] : shiny trophic skin changes, absence of pedal hair b/l LE extremity. interdigital maceration in 4th, 3rd webspace  continue home medications ,cardiology consult

## 2023-03-12 ENCOUNTER — RX RENEWAL (OUTPATIENT)
Age: 53
End: 2023-03-12

## 2023-03-30 ENCOUNTER — APPOINTMENT (OUTPATIENT)
Dept: ENDOCRINOLOGY | Facility: CLINIC | Age: 53
End: 2023-03-30

## 2023-03-30 ENCOUNTER — OUTPATIENT (OUTPATIENT)
Dept: OUTPATIENT SERVICES | Facility: HOSPITAL | Age: 53
LOS: 1 days | End: 2023-03-30
Payer: COMMERCIAL

## 2023-03-30 VITALS
BODY MASS INDEX: 28.53 KG/M2 | HEIGHT: 63 IN | SYSTOLIC BLOOD PRESSURE: 133 MMHG | HEART RATE: 77 BPM | WEIGHT: 161 LBS | DIASTOLIC BLOOD PRESSURE: 76 MMHG

## 2023-03-30 DIAGNOSIS — Z00.00 ENCOUNTER FOR GENERAL ADULT MEDICAL EXAMINATION WITHOUT ABNORMAL FINDINGS: ICD-10-CM

## 2023-03-30 PROCEDURE — 80061 LIPID PANEL: CPT

## 2023-03-30 PROCEDURE — 99214 OFFICE O/P EST MOD 30 MIN: CPT

## 2023-03-30 PROCEDURE — 83036 HEMOGLOBIN GLYCOSYLATED A1C: CPT

## 2023-03-30 PROCEDURE — 85027 COMPLETE CBC AUTOMATED: CPT

## 2023-03-30 PROCEDURE — 80053 COMPREHEN METABOLIC PANEL: CPT

## 2023-03-30 PROCEDURE — 82043 UR ALBUMIN QUANTITATIVE: CPT

## 2023-03-30 NOTE — ADDENDUM
[FreeTextEntry1] : 53 YO M Pmhx of CAD- s/p 4 stents, last sten 3 yrs ago, DM type 2 (c/b peripheral neuropathy and Retinopathy), DLD presents for follow up for DM.\par \par #Type 2 DM \par - under fair control last a1c 7.3 \par - blood glucose readings between 120-180 as per the patient\par - will recheck labs \par - for now continue with synjardy and pioglitazone, has tried GLP! analog but had local reactions with the same\par - has retinopathy follows regularly with opht, also followswith podiatry \par - on a statin and ezetimibe \par

## 2023-03-30 NOTE — PHYSICAL EXAM
[Alert] : alert [Well Developed] : well developed [Normal Hearing] : hearing was normal [Normal Oropharynx] : the oropharynx was normal [Thyroid Not Enlarged] : the thyroid was not enlarged [No Thyroid Nodules] : no palpable thyroid nodules [Clear to Auscultation] : lungs were clear to auscultation bilaterally [Normal S1, S2] : normal S1 and S2 [Regular Rhythm] : with a regular rhythm [No Edema] : no peripheral edema [Normal Bowel Sounds] : normal bowel sounds [Not Tender] : non-tender [Not Distended] : not distended [Soft] : abdomen soft [Normal Anterior Cervical Nodes] : no anterior cervical lymphadenopathy [Normal Posterior Cervical Nodes] : no posterior cervical lymphadenopathy [No Spinal Tenderness] : no spinal tenderness [Spine Straight] : spine straight [No Stigmata of Cushings Syndrome] : no stigmata of Cushings Syndrome [Normal Gait] : normal gait [Normal Strength/Tone] : muscle strength and tone were normal [No Rash] : no rash [Normal Reflexes] : deep tendon reflexes were 2+ and symmetric [No Tremors] : no tremors [Oriented x3] : oriented to person, place, and time [No Acute Distress] : no acute distress [Well Nourished] : well nourished [Normal Sclera/Conjunctiva] : normal sclera/conjunctiva [EOMI] : extraocular movements intact [Normal Outer Ear/Nose] : the outer ears and nose were normal in appearance [No JVD] : no jugular venous distention [No Respiratory Distress] : no respiratory distress  [No Lymphadenopathy] : no lymphadenopathy [No Accessory Muscle Use] : no accessory muscle use [Normal Rate] : normal rate  [Acanthosis Nigricans] : no acanthosis nigricans

## 2023-03-30 NOTE — REVIEW OF SYSTEMS
[Negative] : Heme/Lymph [Vision Problems] : vision problems [Fever] : no fever [Night Sweats] : no night sweats [Pain] : no pain [Itching] : no itching [Earache] : no earache [Nasal Discharge] : no nasal discharge [Chest Pain] : no chest pain [Palpitations] : no palpitations [Orthopena] : no orthopnea [Shortness Of Breath] : no shortness of breath [Wheezing] : no wheezing [Abdominal Pain] : no abdominal pain [Nausea] : no nausea [Vomiting] : no vomiting [Heartburn] : no heartburn [Itching] : no itching

## 2023-03-30 NOTE — ASSESSMENT
[FreeTextEntry1] : #type 2 DM \par -A1C 7.3 in October/2022\par -Not able to tolerate GLP1 agonist in past- had skin blisters\par -Follows with podiatry and ophthalmology\par -c/w Synjardy, Pioglitazone\par -will check A1C, Alb/Cr ratio and Lipid profile and adjust medication based on labs\par  \par #DLD \par -October/2022: Trig 156, HDL 34\par -c/w Statin\par \par -Follows cardiology and PCP\par \par -RTC 6 months or PRN\par \par

## 2023-03-30 NOTE — HISTORY OF PRESENT ILLNESS
[FreeTextEntry1] : 53 YO M Pmhx of CAD- s/p 4 stents, last sten 3 yrs ago, DM type 2 (c/b peripheral neuropathy and Retinopathy), DLD presents for follow up for DM.\par \par Endorses no new complaints. Complains of chronic LE numbness and right eye decreased vision. [de-identified] : MANN ZAFAR is a 50 year old male being seen for a 3 months follow-up visit for DM Type 2 and hyperlipidemia. \par Pt endorses not eating well.\par Pt endorses not seeing in his right eye --> diabetic retinopathy, with retinal detachment with a procedure planned with his ophthalmologist in [place] tomorrow.\par His FBS  has been ranging between 100-200. This morning his FBS is 170.\par Added lantus 10-15 in the AM recently as per Dr Segura\par Sees podiatry regularly. \par Otherwise, no complaints.

## 2023-03-31 ENCOUNTER — NON-APPOINTMENT (OUTPATIENT)
Age: 53
End: 2023-03-31

## 2023-03-31 DIAGNOSIS — E11.65 TYPE 2 DIABETES MELLITUS WITH HYPERGLYCEMIA: ICD-10-CM

## 2023-03-31 DIAGNOSIS — E78.5 HYPERLIPIDEMIA, UNSPECIFIED: ICD-10-CM

## 2023-03-31 LAB
ALBUMIN SERPL ELPH-MCNC: 4.6 G/DL
ALP BLD-CCNC: 70 U/L
ALT SERPL-CCNC: 15 U/L
ANION GAP SERPL CALC-SCNC: 16 MMOL/L
AST SERPL-CCNC: 19 U/L
BASOPHILS # BLD AUTO: 0.05 K/UL
BASOPHILS NFR BLD AUTO: 0.5 %
BILIRUB SERPL-MCNC: 0.5 MG/DL
BUN SERPL-MCNC: 17 MG/DL
CALCIUM SERPL-MCNC: 10.2 MG/DL
CHLORIDE SERPL-SCNC: 99 MMOL/L
CHOLEST SERPL-MCNC: 157 MG/DL
CO2 SERPL-SCNC: 23 MMOL/L
CREAT SERPL-MCNC: 1 MG/DL
CREAT SPEC-SCNC: 86 MG/DL
EGFR: 91 ML/MIN/1.73M2
EOSINOPHIL # BLD AUTO: 0.22 K/UL
EOSINOPHIL NFR BLD AUTO: 2.2 %
ESTIMATED AVERAGE GLUCOSE: 177 MG/DL
GLUCOSE SERPL-MCNC: 127 MG/DL
HBA1C MFR BLD HPLC: 7.8 %
HCT VFR BLD CALC: 52.8 %
HDLC SERPL-MCNC: 35 MG/DL
HGB BLD-MCNC: 17.6 G/DL
IMM GRANULOCYTES NFR BLD AUTO: 0.4 %
LDLC SERPL CALC-MCNC: 82 MG/DL
LYMPHOCYTES # BLD AUTO: 2.92 K/UL
LYMPHOCYTES NFR BLD AUTO: 29.4 %
MAN DIFF?: NORMAL
MCHC RBC-ENTMCNC: 29.3 PG
MCHC RBC-ENTMCNC: 33.3 G/DL
MCV RBC AUTO: 87.9 FL
MICROALBUMIN 24H UR DL<=1MG/L-MCNC: 3.3 MG/DL
MICROALBUMIN/CREAT 24H UR-RTO: 39 MG/G
MONOCYTES # BLD AUTO: 0.65 K/UL
MONOCYTES NFR BLD AUTO: 6.6 %
NEUTROPHILS # BLD AUTO: 6.04 K/UL
NEUTROPHILS NFR BLD AUTO: 60.9 %
NONHDLC SERPL-MCNC: 122 MG/DL
PLATELET # BLD AUTO: 231 K/UL
POTASSIUM SERPL-SCNC: 4.5 MMOL/L
PROT SERPL-MCNC: 7 G/DL
RBC # BLD: 6.01 M/UL
RBC # FLD: 14.9 %
SODIUM SERPL-SCNC: 138 MMOL/L
TRIGL SERPL-MCNC: 202 MG/DL
WBC # FLD AUTO: 9.92 K/UL

## 2023-04-14 ENCOUNTER — APPOINTMENT (OUTPATIENT)
Dept: CARDIOLOGY | Facility: CLINIC | Age: 53
End: 2023-04-14
Payer: COMMERCIAL

## 2023-04-14 VITALS
SYSTOLIC BLOOD PRESSURE: 88 MMHG | WEIGHT: 160 LBS | HEART RATE: 69 BPM | HEIGHT: 63 IN | DIASTOLIC BLOOD PRESSURE: 60 MMHG | BODY MASS INDEX: 28.35 KG/M2

## 2023-04-14 PROCEDURE — 93000 ELECTROCARDIOGRAM COMPLETE: CPT

## 2023-04-14 PROCEDURE — 99214 OFFICE O/P EST MOD 30 MIN: CPT | Mod: 25

## 2023-04-14 NOTE — ASSESSMENT
[FreeTextEntry1] : 51 yo male with pmhx and presentation as above\par cad/ami/pci of lad\par icm, htn, dm\par cont statin and zetia and pravastatin pt has mylgia - did not tolerate lipitor or crestor\par cont bb at night\par cont rest of meds\par med mx for cad\par repeat stress echo\par f/up with endo for dm mx\par aggressive risk modif\par diet and act as tolerated\par repeat labs reviewed, needs better dm mx\par rtc 6 months

## 2023-04-14 NOTE — HISTORY OF PRESENT ILLNESS
[FreeTextEntry1] : 53 yo male with pmhx as below is here for a f/up visit\par 9/18 admitted to [facility] with ami\par s/p pci of lad with vivien\par another admission to [facility] 2/19 with ua, s/p cath - 2v cad, s/p pci of lad and a staged pci of lcx at [facility] 5/19\par no cp, valenzuela,palp\par no major cvs complains\par et is stable\par lost vision in the right eye, having laser sx done on the left\par complaint with meds and diet

## 2023-06-14 ENCOUNTER — APPOINTMENT (OUTPATIENT)
Dept: VASCULAR SURGERY | Facility: CLINIC | Age: 53
End: 2023-06-14
Payer: COMMERCIAL

## 2023-06-14 PROCEDURE — 93922 UPR/L XTREMITY ART 2 LEVELS: CPT

## 2023-06-24 ENCOUNTER — RX RENEWAL (OUTPATIENT)
Age: 53
End: 2023-06-24

## 2023-06-24 RX ORDER — BLOOD SUGAR DIAGNOSTIC
STRIP MISCELLANEOUS
Qty: 100 | Refills: 3 | Status: ACTIVE | COMMUNITY
Start: 2022-05-05 | End: 1900-01-01

## 2023-06-29 ENCOUNTER — APPOINTMENT (OUTPATIENT)
Dept: ENDOCRINOLOGY | Facility: CLINIC | Age: 53
End: 2023-06-29

## 2023-06-29 ENCOUNTER — OUTPATIENT (OUTPATIENT)
Dept: OUTPATIENT SERVICES | Facility: HOSPITAL | Age: 53
LOS: 1 days | End: 2023-06-29
Payer: COMMERCIAL

## 2023-06-29 VITALS
BODY MASS INDEX: 28.53 KG/M2 | DIASTOLIC BLOOD PRESSURE: 73 MMHG | HEART RATE: 71 BPM | SYSTOLIC BLOOD PRESSURE: 125 MMHG | WEIGHT: 161 LBS | HEIGHT: 63 IN

## 2023-06-29 DIAGNOSIS — Z00.00 ENCOUNTER FOR GENERAL ADULT MEDICAL EXAMINATION WITHOUT ABNORMAL FINDINGS: ICD-10-CM

## 2023-06-29 PROCEDURE — 99214 OFFICE O/P EST MOD 30 MIN: CPT

## 2023-06-29 PROCEDURE — 83036 HEMOGLOBIN GLYCOSYLATED A1C: CPT

## 2023-06-29 PROCEDURE — 80061 LIPID PANEL: CPT

## 2023-06-29 PROCEDURE — 36415 COLL VENOUS BLD VENIPUNCTURE: CPT

## 2023-06-29 PROCEDURE — 80053 COMPREHEN METABOLIC PANEL: CPT

## 2023-06-29 PROCEDURE — 82043 UR ALBUMIN QUANTITATIVE: CPT

## 2023-06-29 NOTE — PHYSICAL EXAM
[Alert] : alert [No Respiratory Distress] : no respiratory distress [No Accessory Muscle Use] : no accessory muscle use [Normal S1, S2] : normal S1 and S2 [Oriented x3] : oriented to person, place, and time

## 2023-06-29 NOTE — HISTORY OF PRESENT ILLNESS
[FreeTextEntry1] : 54 YO M Pmhx of CAD- s/p 4 stents, DM type 2 (c/b peripheral neuropathy and Retinopathy), DLD presents for follow up for DM.  Patient states he feels well and has no complaints.  Follows with ophthalmology in [facility] and podiatry here.  Patient is not sure if he ever started the Januvia which was started last visit.

## 2023-06-29 NOTE — REVIEW OF SYSTEMS
[Blurred Vision] : blurred vision [Fatigue] : no fatigue [Recent Weight Gain (___ Lbs)] : no recent weight gain [Recent Weight Loss (___ Lbs)] : no recent weight loss [Chest Pain] : no chest pain [Nausea] : no nausea [Abdominal Pain] : no abdominal pain [Vomiting] : no vomiting

## 2023-06-29 NOTE — ADDENDUM
[FreeTextEntry1] : 51 YO M Pmhx of CAD- s/p 4 stents, last sten 3 yrs ago, DM type 2 (c/b peripheral neuropathy and Retinopathy), DLD presents for follow up for DM.  He states that he currently is on Synjardy and pioglitazone he was advised to start Januvia on the last visit as his A1c was 7.8 but he is unsure whether he is taking it he states that his wife manages all his medications and he is not sure which ones he takes.  He also states that he takes long-acting insulin (basaglar? )  randomly when he feels his blood glucose readings are elevated/ He states his blood glucose readings are below 200 most always \par \par #Type 2 DM \par -Complicated by peripheral neuropathy and retinopathy\par - last a1c 7.8 ( 03/23 ) \par - blood glucose readings less than 200 mg /dl as per the patient \par - currently on synjardy and pioglitazone, was advised to start januvia but is unsure wether he takes it ( his wife arranges his medications ) \par - for now continue with synjardy and pioglitazone start januvia has tried GLP! analog but had local reactions with the same ? Patient is unclear about the same \par - will check A1c Lipid panel urine albumin creatinine ration \par - has retinopathy follows regularly with opht, also follows with podiatry \par - on a statin and ezetimibe \par

## 2023-06-29 NOTE — ASSESSMENT
[FreeTextEntry1] : 54 YO M Pmhx of CAD- s/p 4 stents, last sten 3 yrs ago, DM type 2 (c/b peripheral neuropathy and Retinopathy), DLD presents for follow up for DM.\par \par #type 2 DM \par -A1C 7.3 in October/2022, in March 2023 7.8%\par -urine albumin:Cr 39\par -Not able to tolerate GLP1 agonist in past- had skin blisters\par -Follows with podiatry and ophthalmology\par -c/w Synjardy, Pioglitazone\par -was started on januvia in March however patient forgot to start taking it\par -will refill all meds and educated patient on medication compliance\par -follow up in 3 months with repeat labs\par  \par #DLD \par -March/2023: Trig 202, HDL 35\par -c/w Statin\par \par -Follows cardiology and PCP\par \par -RTC 6 months or PRN\par \par

## 2023-06-30 DIAGNOSIS — E78.5 HYPERLIPIDEMIA, UNSPECIFIED: ICD-10-CM

## 2023-06-30 DIAGNOSIS — E11.65 TYPE 2 DIABETES MELLITUS WITH HYPERGLYCEMIA: ICD-10-CM

## 2023-06-30 LAB
ALBUMIN SERPL ELPH-MCNC: 4.8 G/DL
ALP BLD-CCNC: 69 U/L
ALT SERPL-CCNC: 17 U/L
ANION GAP SERPL CALC-SCNC: 17 MMOL/L
AST SERPL-CCNC: 17 U/L
BILIRUB SERPL-MCNC: 0.5 MG/DL
BUN SERPL-MCNC: 19 MG/DL
CALCIUM SERPL-MCNC: 9.7 MG/DL
CHLORIDE SERPL-SCNC: 101 MMOL/L
CHOLEST SERPL-MCNC: 154 MG/DL
CO2 SERPL-SCNC: 21 MMOL/L
CREAT SERPL-MCNC: 1 MG/DL
CREAT SPEC-SCNC: 106 MG/DL
EGFR: 90 ML/MIN/1.73M2
ESTIMATED AVERAGE GLUCOSE: 183 MG/DL
GLUCOSE SERPL-MCNC: 114 MG/DL
HBA1C MFR BLD HPLC: 8 %
HDLC SERPL-MCNC: 35 MG/DL
LDLC SERPL CALC-MCNC: 74 MG/DL
MICROALBUMIN 24H UR DL<=1MG/L-MCNC: 8.2 MG/DL
MICROALBUMIN/CREAT 24H UR-RTO: 77 MG/G
NONHDLC SERPL-MCNC: 119 MG/DL
POTASSIUM SERPL-SCNC: 4.2 MMOL/L
PROT SERPL-MCNC: 7 G/DL
SODIUM SERPL-SCNC: 139 MMOL/L
TRIGL SERPL-MCNC: 227 MG/DL

## 2023-07-05 ENCOUNTER — NON-APPOINTMENT (OUTPATIENT)
Age: 53
End: 2023-07-05

## 2023-07-17 ENCOUNTER — OUTPATIENT (OUTPATIENT)
Dept: OUTPATIENT SERVICES | Facility: HOSPITAL | Age: 53
LOS: 1 days | End: 2023-07-17
Payer: COMMERCIAL

## 2023-07-17 ENCOUNTER — APPOINTMENT (OUTPATIENT)
Dept: NUTRITION | Facility: CLINIC | Age: 53
End: 2023-07-17

## 2023-07-17 DIAGNOSIS — Z00.00 ENCOUNTER FOR GENERAL ADULT MEDICAL EXAMINATION WITHOUT ABNORMAL FINDINGS: ICD-10-CM

## 2023-07-17 PROCEDURE — G0108: CPT

## 2023-07-18 DIAGNOSIS — E11.59 TYPE 2 DIABETES MELLITUS WITH OTHER CIRCULATORY COMPLICATIONS: ICD-10-CM

## 2023-07-18 NOTE — PATIENT PROFILE ADULT - LAST BOWEL MOVEMENT DATE
21-Feb-2019 Mohs Method Verbiage: An incision at a 45 degree angle following the standard Mohs approach was done and the specimen was harvested as a microscopic controlled layer.

## 2023-07-21 ENCOUNTER — APPOINTMENT (OUTPATIENT)
Dept: PODIATRY | Facility: CLINIC | Age: 53
End: 2023-07-21
Payer: COMMERCIAL

## 2023-07-21 ENCOUNTER — OUTPATIENT (OUTPATIENT)
Dept: OUTPATIENT SERVICES | Facility: HOSPITAL | Age: 53
LOS: 1 days | End: 2023-07-21
Payer: COMMERCIAL

## 2023-07-21 ENCOUNTER — APPOINTMENT (OUTPATIENT)
Dept: PODIATRY | Facility: CLINIC | Age: 53
End: 2023-07-21

## 2023-07-21 DIAGNOSIS — E11.42 TYPE 2 DIABETES MELLITUS WITH DIABETIC POLYNEUROPATHY: ICD-10-CM

## 2023-07-21 DIAGNOSIS — Z00.00 ENCOUNTER FOR GENERAL ADULT MEDICAL EXAMINATION WITHOUT ABNORMAL FINDINGS: ICD-10-CM

## 2023-07-21 PROCEDURE — 99213 OFFICE O/P EST LOW 20 MIN: CPT

## 2023-07-21 PROCEDURE — 99213 OFFICE O/P EST LOW 20 MIN: CPT | Mod: GC

## 2023-07-21 NOTE — HISTORY OF PRESENT ILLNESS
[Sneakers] : daryl [FreeTextEntry1] : 53 year old M  presents for a diabetic foot evaluation. Mr. ZAFAR  relates tingling in his right first toe. Complaining that his toes always feel cold. . Last A1c was 8.0% 1 month ago.

## 2023-07-21 NOTE — PHYSICAL EXAM
[General Appearance - Alert] : alert [General Appearance - In No Acute Distress] : in no acute distress [Ankle Swelling (On Exam)] : present [Ankle Swelling Bilaterally] : bilaterally  [Ankle Swelling On The Right] : mild [1+] : left foot dorsalis pedis 1+ [No Joint Swelling] : no joint swelling [Oriented To Time, Place, And Person] : oriented to person, place, and time [Impaired Insight] : insight and judgment were intact [Diminished Throughout Right Foot] : diminished sensation with monofilament testing throughout right foot [Diminished Throughout Left Foot] : diminished sensation with monofilament testing throughout left foot [Delayed in the Right Toes] : capillary refills normal in right toes [Delayed in the Left Toes] : capillary refills normal in the left toes [FreeTextEntry3] : Pedal pulses palpable [de-identified] : No gross skeletal deformities [Foot Ulcer] : no foot ulcer [Vibration Dec.] : normal vibratory sensation at the level of the toes [FreeTextEntry1] : 8/10 with [facility]

## 2023-07-21 NOTE — ASSESSMENT
[FreeTextEntry1] : Modified ADA Diabetic Foot Risk Classification\par A1c reviewed \par -Loss of protective sensation: yes  \par -Presence of foot deformity:    no \par -presence of pre-ulcerative lesion:  no\par   -hemorrhage into callus:  no\par -atrophy of heel or metatarsal fat pads: no\par \par -Diabetic neurovascular foot assessment  performed. \par -Discussed with patient diabetic foot hygiene.Patient instructed to regularly check the bottom of the feet\par -Rx ammonium lactate renewed\par -May cont compound cream for right first toe neuritis as needed\par -Reviewed RAMANA/PVR results- findings normal\par -Advised patient to use calf compressions stockings \par -Will refer to vascular or physical therapy if leg cramping symptoms continue\par -RTC 6 weeks\par \par \par

## 2023-07-21 NOTE — END OF VISIT
[] : Resident [FreeTextEntry3] : patient reports anterior shin pain during ambulation-->recommended calf compression socks. pt declined PT referral for now\par diabetic at risk exam performed\par -no open ulcers\par -a1c trending up\par -new onset of right first toe numbness \par -darya/pvr reviewed with patient\par -Rx ammonium lactate for dry skin\par -return 6 month\par My notes supersedes the above resident documentation in case of discrepancy. Correction for their notes is in my notes. \par \par

## 2023-09-28 ENCOUNTER — APPOINTMENT (OUTPATIENT)
Dept: ENDOCRINOLOGY | Facility: CLINIC | Age: 53
End: 2023-09-28

## 2023-09-28 NOTE — PATIENT PROFILE ADULT. - HAS THE PATIENT HAD A SIGNIFICANT CHANGE IN FUNCTIONAL STATUS DUE TO CVA, HEAD TRAUMA, ORTHOPEDIC TRAUMA/SURGERY, OR FALL, WITH THE WEEK PRIOR TO ADMISSION
no Plan: Discussed with pt to treat one area at a time with Fluorouracil cream, up to 2 weeks as tolerated. \\Denaso discussed taking Nicotinamide 500mg BID. Detail Level: Zone Render In Strict Bullet Format?: No

## 2023-10-20 ENCOUNTER — APPOINTMENT (OUTPATIENT)
Dept: CARDIOLOGY | Facility: CLINIC | Age: 53
End: 2023-10-20
Payer: MEDICAID

## 2023-10-20 VITALS
HEIGHT: 63 IN | DIASTOLIC BLOOD PRESSURE: 71 MMHG | SYSTOLIC BLOOD PRESSURE: 98 MMHG | BODY MASS INDEX: 28.7 KG/M2 | HEART RATE: 65 BPM | WEIGHT: 162 LBS

## 2023-10-20 DIAGNOSIS — E11.40 TYPE 2 DIABETES MELLITUS WITH DIABETIC NEUROPATHY, UNSPECIFIED: ICD-10-CM

## 2023-10-20 PROCEDURE — 99214 OFFICE O/P EST MOD 30 MIN: CPT | Mod: 25

## 2023-10-20 PROCEDURE — 93000 ELECTROCARDIOGRAM COMPLETE: CPT

## 2023-11-03 ENCOUNTER — RX RENEWAL (OUTPATIENT)
Age: 53
End: 2023-11-03

## 2023-11-03 RX ORDER — EMPAGLIFLOZIN AND METFORMIN HYDROCHLORIDE 12.5; 1 MG/1; MG/1
12.5-1 TABLET ORAL
Qty: 180 | Refills: 3 | Status: ACTIVE | COMMUNITY
Start: 2021-03-31 | End: 1900-01-01

## 2023-11-03 RX ORDER — PRAVASTATIN SODIUM 80 MG/1
80 TABLET ORAL
Qty: 90 | Refills: 3 | Status: ACTIVE | COMMUNITY
Start: 2019-03-01 | End: 1900-01-01

## 2024-01-09 ENCOUNTER — APPOINTMENT (OUTPATIENT)
Dept: NUTRITION | Facility: CLINIC | Age: 54
End: 2024-01-09

## 2024-01-15 ENCOUNTER — RX RENEWAL (OUTPATIENT)
Age: 54
End: 2024-01-15

## 2024-01-30 ENCOUNTER — OUTPATIENT (OUTPATIENT)
Dept: OUTPATIENT SERVICES | Facility: HOSPITAL | Age: 54
LOS: 1 days | End: 2024-01-30
Payer: COMMERCIAL

## 2024-01-30 ENCOUNTER — APPOINTMENT (OUTPATIENT)
Dept: PODIATRY | Facility: CLINIC | Age: 54
End: 2024-01-30
Payer: COMMERCIAL

## 2024-01-30 DIAGNOSIS — Z00.00 ENCOUNTER FOR GENERAL ADULT MEDICAL EXAMINATION WITHOUT ABNORMAL FINDINGS: ICD-10-CM

## 2024-01-30 PROCEDURE — 99213 OFFICE O/P EST LOW 20 MIN: CPT

## 2024-02-01 NOTE — PHYSICAL EXAM
[General Appearance - Alert] : alert [General Appearance - In No Acute Distress] : in no acute distress [Ankle Swelling (On Exam)] : present [Ankle Swelling Bilaterally] : bilaterally  [Ankle Swelling On The Right] : mild [1+] : left foot dorsalis pedis 1+ [No Joint Swelling] : no joint swelling [Oriented To Time, Place, And Person] : oriented to person, place, and time [Impaired Insight] : insight and judgment were intact [Delayed in the Right Toes] : capillary refills normal in right toes [Delayed in the Left Toes] : capillary refills normal in the left toes [FreeTextEntry3] : Pedal pulses palpable [de-identified] : No gross skeletal deformities [Foot Ulcer] : no foot ulcer [FreeTextEntry1] : minimal scaling on plantar surface of both feet  [Vibration Dec.] : normal vibratory sensation at the level of the toes [Diminished Throughout Right Foot] : normal sensation with monofilament testing throughout right foot [Diminished Throughout Left Foot] : normal sensation with monofilament testing throughout left foot

## 2024-02-01 NOTE — HISTORY OF PRESENT ILLNESS
[Sneakers] : daryl [FreeTextEntry1] : 53 year old M  presents for a diabetic foot evaluation. Mr. ZAFAR  relates tingling in his right first toe. Complaining that his toes always feel cold. . Last A1c was 8.0% 6/2023.

## 2024-02-01 NOTE — END OF VISIT
[] : Resident [FreeTextEntry3] : DM neurovascular exam performed. agree with findings and plan of care My notes supersedes the above resident documentation in case of discrepancy. Correction for their notes is in my notes.

## 2024-02-01 NOTE — ASSESSMENT
[Verbal] : verbal [Patient] : patient [FreeTextEntry1] : Modified ADA Diabetic Foot Risk Classification A1c reviewed  -Loss of protective sensation: no  -Presence of foot deformity:    no  -presence of pre-ulcerative lesion:  no   -hemorrhage into callus:  no -atrophy of heel or metatarsal fat pads: no  -Diabetic neurovascular foot assessment  performed.  -Discussed with patient diabetic foot hygiene.Patient instructed to regularly check the bottom of the feet -Advised patient to use calf compressions stockings  -Will refer to vascular leg cramping symptoms continue and cold feet -RTC 6 months
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
## 2024-02-05 DIAGNOSIS — E11.59 TYPE 2 DIABETES MELLITUS WITH OTHER CIRCULATORY COMPLICATIONS: ICD-10-CM
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
## 2024-02-20 ENCOUNTER — APPOINTMENT (OUTPATIENT)
Dept: PODIATRY | Facility: CLINIC | Age: 54
End: 2024-02-20
Payer: MEDICAID

## 2024-02-20 ENCOUNTER — OUTPATIENT (OUTPATIENT)
Dept: OUTPATIENT SERVICES | Facility: HOSPITAL | Age: 54
LOS: 1 days | End: 2024-02-20
Payer: MEDICAID

## 2024-02-20 DIAGNOSIS — Z00.00 ENCOUNTER FOR GENERAL ADULT MEDICAL EXAMINATION WITHOUT ABNORMAL FINDINGS: ICD-10-CM

## 2024-02-20 DIAGNOSIS — B35.3 TINEA PEDIS: ICD-10-CM

## 2024-02-20 PROCEDURE — 99203 OFFICE O/P NEW LOW 30 MIN: CPT

## 2024-02-20 RX ORDER — CICLOPIROX 80 MG/ML
8 SOLUTION TOPICAL
Qty: 1 | Refills: 6 | Status: ACTIVE | COMMUNITY
Start: 2024-02-20 | End: 1900-01-01

## 2024-02-21 PROBLEM — B35.3 TINEA PEDIS OF RIGHT FOOT: Status: ACTIVE | Noted: 2019-09-13

## 2024-02-21 NOTE — ASSESSMENT
[FreeTextEntry1] : -Rx ciclopirox 8% for fungal nails. -Rx ciclopirox 0.77 cream for tinea pedis -right hallux nail debrided -return 6 months

## 2024-02-21 NOTE — HISTORY OF PRESENT ILLNESS
[FreeTextEntry1] : 52 y/o male presents with c/c of right hallux nail dystrophy. noted nail changes several weeks ago.

## 2024-02-21 NOTE — PHYSICAL EXAM
[General Appearance - Alert] : alert [General Appearance - In No Acute Distress] : in no acute distress [2+] : left foot dorsalis pedis 2+ [FreeTextEntry1] : right hallux nail lyses w/ subungual debris at the lateral nail boarder annular skin scaling in plantar right first toe and dorsal 2 and 3rd toe

## 2024-02-28 DIAGNOSIS — B35.1 TINEA UNGUIUM: ICD-10-CM
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
## 2024-02-28 DIAGNOSIS — B35.3 TINEA PEDIS: ICD-10-CM
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
## 2024-03-04 ENCOUNTER — APPOINTMENT (OUTPATIENT)
Dept: VASCULAR SURGERY | Facility: CLINIC | Age: 54
End: 2024-03-04
Payer: MEDICAID

## 2024-03-04 VITALS — BODY MASS INDEX: 28.35 KG/M2 | HEIGHT: 63 IN | WEIGHT: 160 LBS

## 2024-03-04 DIAGNOSIS — I70.213 ATHEROSCLEROSIS OF NATIVE ARTERIES OF EXTREMITIES WITH INTERMITTENT CLAUDICATION, BILATERAL LEGS: ICD-10-CM

## 2024-03-04 PROCEDURE — 93925 LOWER EXTREMITY STUDY: CPT

## 2024-03-04 PROCEDURE — G2211 COMPLEX E/M VISIT ADD ON: CPT | Mod: NC,1L

## 2024-03-04 PROCEDURE — 99203 OFFICE O/P NEW LOW 30 MIN: CPT

## 2024-03-04 NOTE — PHYSICAL EXAM
[2+] : left 2+ [Ankle Swelling (On Exam)] : not present [Varicose Veins Of Lower Extremities] : not present [] : not present [No Rash or Lesion] : No rash or lesion [FreeTextEntry1] : Small wound at the base of the nailbed in the right foot.

## 2024-03-04 NOTE — ASSESSMENT
[FreeTextEntry1] : The patient is a 53-year-old male with a longstanding history of diabetes coronary disease.  He is currently on a lipid-lowering medication continuing with his glycemic control and baby aspirin daily.  I recommend the patient continue with a try to take glycemic control as he is informing his recent hemoglobin A1c was over 7.  I also recommend the patient implement and walking regiment to increase his circulation.  No vascular surgery intervention warranted at this time.  I recommend he follow-up with podiatry for treatment of his first toe nailbed wound.  I will see the patient back in my office in 1 years time or sooner if any new symptoms develop.

## 2024-03-04 NOTE — HISTORY OF PRESENT ILLNESS
[FreeTextEntry1] : The patient is a 53-year-old male with a longstanding history of diabetes for 30 years who presents for evaluation for peripheral vascular disease.  The patient complains of having a right first toe wound and numbness to his first toe as well.  Patient was referred by his podiatrist for evaluation

## 2024-03-04 NOTE — REASON FOR VISIT
[Consultation] : a consultation visit [FreeTextEntry1] : For evaluation for peripheral vascular disease

## 2024-03-08 RX ORDER — METOPROLOL TARTRATE 50 MG/1
50 TABLET, FILM COATED ORAL
Qty: 90 | Refills: 3 | Status: ACTIVE | COMMUNITY
Start: 1900-01-01 | End: 1900-01-01

## 2024-04-01 ENCOUNTER — APPOINTMENT (OUTPATIENT)
Dept: PODIATRY | Facility: CLINIC | Age: 54
End: 2024-04-01

## 2024-04-05 ENCOUNTER — APPOINTMENT (OUTPATIENT)
Dept: PODIATRY | Facility: CLINIC | Age: 54
End: 2024-04-05
Payer: MEDICAID

## 2024-04-05 ENCOUNTER — LABORATORY RESULT (OUTPATIENT)
Age: 54
End: 2024-04-05

## 2024-04-05 ENCOUNTER — APPOINTMENT (OUTPATIENT)
Dept: CARDIOLOGY | Facility: CLINIC | Age: 54
End: 2024-04-05
Payer: MEDICAID

## 2024-04-05 VITALS
WEIGHT: 154 LBS | HEIGHT: 63 IN | SYSTOLIC BLOOD PRESSURE: 84 MMHG | DIASTOLIC BLOOD PRESSURE: 56 MMHG | HEART RATE: 87 BPM | BODY MASS INDEX: 27.29 KG/M2

## 2024-04-05 VITALS — BODY MASS INDEX: 27.29 KG/M2 | WEIGHT: 154 LBS | HEIGHT: 63 IN

## 2024-04-05 DIAGNOSIS — I25.10 ATHEROSCLEROTIC HEART DISEASE OF NATIVE CORONARY ARTERY W/OUT ANGINA PECTORIS: ICD-10-CM

## 2024-04-05 DIAGNOSIS — E78.5 HYPERLIPIDEMIA, UNSPECIFIED: ICD-10-CM

## 2024-04-05 DIAGNOSIS — E11.9 TYPE 2 DIABETES MELLITUS W/OUT COMPLICATIONS: ICD-10-CM

## 2024-04-05 DIAGNOSIS — E11.59 TYPE 2 DIABETES MELLITUS WITH OTHER CIRCULATORY COMPLICATIONS: ICD-10-CM

## 2024-04-05 DIAGNOSIS — I10 ESSENTIAL (PRIMARY) HYPERTENSION: ICD-10-CM

## 2024-04-05 DIAGNOSIS — B35.1 TINEA UNGUIUM: ICD-10-CM

## 2024-04-05 DIAGNOSIS — E11.42 TYPE 2 DIABETES MELLITUS WITH DIABETIC POLYNEUROPATHY: ICD-10-CM

## 2024-04-05 PROCEDURE — 93000 ELECTROCARDIOGRAM COMPLETE: CPT

## 2024-04-05 PROCEDURE — 99214 OFFICE O/P EST MOD 30 MIN: CPT | Mod: 25

## 2024-04-05 PROCEDURE — 99213 OFFICE O/P EST LOW 20 MIN: CPT

## 2024-04-05 RX ORDER — KETOROLAC TROMETHAMINE 5 MG/ML
0.5 SOLUTION OPHTHALMIC
Qty: 10 | Refills: 0 | Status: ACTIVE | COMMUNITY
Start: 2024-02-09

## 2024-04-05 RX ORDER — OFLOXACIN 3 MG/ML
0.3 SOLUTION/ DROPS OPHTHALMIC
Qty: 5 | Refills: 0 | Status: ACTIVE | COMMUNITY
Start: 2024-02-09

## 2024-04-05 RX ORDER — PREDNISOLONE ACETATE 10 MG/ML
1 SUSPENSION/ DROPS OPHTHALMIC
Qty: 5 | Refills: 0 | Status: ACTIVE | COMMUNITY
Start: 2024-02-09

## 2024-04-05 RX ORDER — AMMONIUM LACTATE 12 %
12 CREAM (GRAM) TOPICAL
Qty: 1 | Refills: 5 | Status: ACTIVE | COMMUNITY
Start: 2024-04-05 | End: 1900-01-01

## 2024-04-05 RX ORDER — EZETIMIBE 10 MG/1
10 TABLET ORAL DAILY
Qty: 90 | Refills: 3 | Status: ACTIVE | COMMUNITY
Start: 2023-11-03 | End: 1900-01-01

## 2024-04-05 RX ORDER — SILDENAFIL 100 MG/1
100 TABLET, FILM COATED ORAL
Qty: 15 | Refills: 0 | Status: ACTIVE | COMMUNITY
Start: 2024-03-14

## 2024-04-05 NOTE — HISTORY OF PRESENT ILLNESS
[Sneakers] : daryl [FreeTextEntry1] : CC: "Right big toe thick" HPI: -52 y/o male presents for 2 mo f/u right hallux nail dystrophy and tinea x months -Using ciclopirox cream & nail lacquer  -A1C 6/2023 8.0%  -No itching -Established care w/Vascular 3/2024; sent for LE A-Duplex which was normal. No Vascular intervention -No other pedal complaints

## 2024-04-05 NOTE — END OF VISIT
[] : Resident [FreeTextEntry3] : continue ciclopirox solution diabetic neurovascular exam performed  tinea pedis resolved -dry sklin-->rx am lactin

## 2024-04-05 NOTE — PHYSICAL EXAM
[General Appearance - Alert] : alert [General Appearance - In No Acute Distress] : in no acute distress [General Appearance - Well Nourished] : well nourished [General Appearance - Well Developed] : well developed [Ankle Swelling Bilaterally] : bilaterally  [2+] : left foot dorsalis pedis 2+ [No Joint Swelling] : no joint swelling [Normal Foot/Ankle] : Both lower extremities were exposed and visualized. Standing exam demonstrates normal foot posture and alignment. Hindfoot exam shows no hindfoot valgus or varus [Skin Color & Pigmentation] : normal skin color and pigmentation [Skin Turgor] : normal skin turgor [Skin Lesions] : no skin lesions [Sensation] : the sensory exam was normal to light touch and pinprick [Oriented To Time, Place, And Person] : oriented to person, place, and time [Impaired Insight] : insight and judgment were intact [Affect] : the affect was normal [Mood] : the mood was normal [Ankle Swelling (On Exam)] : not present [Varicose Veins Of Lower Extremities] : not present [] : not present [Delayed in the Right Toes] : capillary refills normal in right toes [Delayed in the Left Toes] : capillary refills normal in the left toes [Foot Ulcer] : no foot ulcer [Skin Induration] : no skin induration [FreeTextEntry1] : Right hallux medial border lysis w/ subungual debris Proximal nail plate clear Pedal hair present No gross scaling BL feet

## 2024-04-05 NOTE — ASSESSMENT
[Verbal] : verbal [Patient] : patient [Good - alert, interested, motivated] : Good - alert, interested, motivated [Demonstrates independently] : demonstrates independently [FreeTextEntry1] : Assessment: -Onychomycosis -Tinea pedis  Plan: -Pt seen & evaluated. Dx & tx discussed w/patient.  -Continue ciclopirox 8% for fungal nails. -Continue ciclopirox 0.77 cream for tinea pedis, PRN -Rx Amlactin -Next visit consider repeat A1C -RTC 3 months for regular diabetic foot check

## 2024-04-05 NOTE — HISTORY OF PRESENT ILLNESS
[FreeTextEntry1] : 52 yo male with pmhx as below is here for a f/up visit 9/18 admitted to [facility] with ami s/p pci of lad with vivien another admission to [facility] 2/19 with ua, s/p cath - 2v cad, s/p pci of lad and a staged pci of lcx at [facility] 5/19 no cp, valenzuela,palp no major cvs complains et is stable lost vision in the right eye, having laser sx done on the left complaint with meds and diet

## 2024-04-05 NOTE — ASSESSMENT
[FreeTextEntry1] : 54 yo male with pmhx and presentation as above cad/ami/pci of lad icm, htn, dm cont statin and zetia and pravastatin pt has mylgia - did not tolerate lipitor or crestor cont bb at night cont rest of meds med mx for cad repeat stress echo before the next visit f/up with endo for dm mx aggressive risk modif diet and act as tolerated repeat labs  stress echo, rtc 6 months.

## 2024-04-05 NOTE — REVIEW OF SYSTEMS
[As Noted in HPI] : as noted in HPI [Negative] : Neurological [Skin Lesions] : no skin lesions [Skin Wound] : no skin wound [Itching] : no itching [Change In A Mole] : no change in a mole [Dry Skin] : no dry skin [An Unusual Growth] : no unusual growth on the skin [de-identified] : Thick R big toenail

## 2024-04-05 NOTE — REASON FOR VISIT
99 [Symptom and Test Evaluation] : symptom and test evaluation [Hyperlipidemia] : hyperlipidemia [Hypertension] : hypertension [Coronary Artery Disease] : coronary artery disease [id number] Exp Problem Focused - Mod. Complex

## 2024-04-09 LAB
ALBUMIN SERPL ELPH-MCNC: 4.5 G/DL
ALP BLD-CCNC: 66 U/L
ALT SERPL-CCNC: 13 U/L
ANION GAP SERPL CALC-SCNC: 13 MMOL/L
AST SERPL-CCNC: 13 U/L
BILIRUB SERPL-MCNC: 0.4 MG/DL
BUN SERPL-MCNC: 18 MG/DL
CALCIUM SERPL-MCNC: 9.6 MG/DL
CHLORIDE SERPL-SCNC: 103 MMOL/L
CHOLEST SERPL-MCNC: 130 MG/DL
CK SERPL-CCNC: 64 U/L
CO2 SERPL-SCNC: 25 MMOL/L
CREAT SERPL-MCNC: 1.1 MG/DL
EGFR: 80 ML/MIN/1.73M2
GLUCOSE SERPL-MCNC: 190 MG/DL
HCT VFR BLD CALC: 48.8 %
HDLC SERPL-MCNC: 39 MG/DL
HGB BLD-MCNC: 16.8 G/DL
LDLC SERPL CALC-MCNC: 73 MG/DL
MCHC RBC-ENTMCNC: 29.7 PG
MCHC RBC-ENTMCNC: 34.4 GM/DL
MCV RBC AUTO: 86.2 FL
NONHDLC SERPL-MCNC: 91 MG/DL
PLATELET # BLD AUTO: 231 K/UL
POTASSIUM SERPL-SCNC: 4.6 MMOL/L
PROT SERPL-MCNC: 6.7 G/DL
RBC # BLD: 5.66 M/UL
RBC # FLD: 17.1 %
SODIUM SERPL-SCNC: 141 MMOL/L
TRIGL SERPL-MCNC: 94 MG/DL
TSH SERPL-ACNC: 2.37 UIU/ML
WBC # FLD AUTO: 9.51 K/UL

## 2024-04-12 ENCOUNTER — APPOINTMENT (OUTPATIENT)
Dept: CARDIOLOGY | Facility: CLINIC | Age: 54
End: 2024-04-12

## 2024-04-15 ENCOUNTER — RX RENEWAL (OUTPATIENT)
Age: 54
End: 2024-04-15

## 2024-04-23 ENCOUNTER — NON-APPOINTMENT (OUTPATIENT)
Age: 54
End: 2024-04-23

## 2024-04-26 ENCOUNTER — RX RENEWAL (OUTPATIENT)
Age: 54
End: 2024-04-26

## 2024-04-26 RX ORDER — PIOGLITAZONE HYDROCHLORIDE 30 MG/1
30 TABLET ORAL
Qty: 90 | Refills: 3 | Status: ACTIVE | COMMUNITY
Start: 1900-01-01 | End: 1900-01-01

## 2024-04-26 RX ORDER — ENALAPRIL MALEATE 2.5 MG/1
2.5 TABLET ORAL
Qty: 30 | Refills: 6 | Status: ACTIVE | COMMUNITY
Start: 2019-03-01 | End: 1900-01-01

## 2024-04-26 RX ORDER — ASPIRIN 81 MG/1
81 TABLET, COATED ORAL
Qty: 30 | Refills: 6 | Status: ACTIVE | COMMUNITY
Start: 2021-12-07 | End: 1900-01-01

## 2024-04-28 ENCOUNTER — RX RENEWAL (OUTPATIENT)
Age: 54
End: 2024-04-28

## 2024-06-25 ENCOUNTER — RX RENEWAL (OUTPATIENT)
Age: 54
End: 2024-06-25

## 2024-06-25 RX ORDER — CICLOPIROX OLAMINE 7.7 MG/G
0.77 CREAM TOPICAL TWICE DAILY
Qty: 90 | Refills: 0 | Status: ACTIVE | COMMUNITY
Start: 2024-02-20 | End: 1900-01-01

## 2024-07-08 ENCOUNTER — APPOINTMENT (OUTPATIENT)
Dept: PODIATRY | Facility: CLINIC | Age: 54
End: 2024-07-08
Payer: MEDICAID

## 2024-07-08 ENCOUNTER — OUTPATIENT (OUTPATIENT)
Dept: OUTPATIENT SERVICES | Facility: HOSPITAL | Age: 54
LOS: 1 days | End: 2024-07-08
Payer: MEDICAID

## 2024-07-08 DIAGNOSIS — B35.1 TINEA UNGUIUM: ICD-10-CM

## 2024-07-08 DIAGNOSIS — E11.9 TYPE 2 DIABETES MELLITUS W/OUT COMPLICATIONS: ICD-10-CM

## 2024-07-08 DIAGNOSIS — E11.40 TYPE 2 DIABETES MELLITUS WITH DIABETIC NEUROPATHY, UNSPECIFIED: ICD-10-CM

## 2024-07-08 DIAGNOSIS — Z00.00 ENCOUNTER FOR GENERAL ADULT MEDICAL EXAMINATION WITHOUT ABNORMAL FINDINGS: ICD-10-CM

## 2024-07-08 PROCEDURE — 36415 COLL VENOUS BLD VENIPUNCTURE: CPT

## 2024-07-08 PROCEDURE — 83036 HEMOGLOBIN GLYCOSYLATED A1C: CPT

## 2024-07-08 PROCEDURE — 99213 OFFICE O/P EST LOW 20 MIN: CPT

## 2024-07-09 LAB
ESTIMATED AVERAGE GLUCOSE: 171 MG/DL
HBA1C MFR BLD HPLC: 7.6 %
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
## 2024-07-17 DIAGNOSIS — E11.40 TYPE 2 DIABETES MELLITUS WITH DIABETIC NEUROPATHY, UNSPECIFIED: ICD-10-CM

## 2024-07-17 DIAGNOSIS — B35.1 TINEA UNGUIUM: ICD-10-CM

## 2024-07-17 DIAGNOSIS — E11.9 TYPE 2 DIABETES MELLITUS WITHOUT COMPLICATIONS: ICD-10-CM
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
## 2024-08-08 ENCOUNTER — APPOINTMENT (OUTPATIENT)
Dept: ENDOCRINOLOGY | Facility: CLINIC | Age: 54
End: 2024-08-08

## 2024-08-08 ENCOUNTER — OUTPATIENT (OUTPATIENT)
Dept: OUTPATIENT SERVICES | Facility: HOSPITAL | Age: 54
LOS: 1 days | End: 2024-08-08
Payer: COMMERCIAL

## 2024-08-08 DIAGNOSIS — Z00.00 ENCOUNTER FOR GENERAL ADULT MEDICAL EXAMINATION WITHOUT ABNORMAL FINDINGS: ICD-10-CM

## 2024-08-08 PROCEDURE — 99214 OFFICE O/P EST MOD 30 MIN: CPT

## 2024-08-08 NOTE — HISTORY OF PRESENT ILLNESS
[FreeTextEntry1] : not seen for 1.5 years, very non compliant, severe proliferative retinopathy requiring injection therapy at [facility].

## 2024-08-09 DIAGNOSIS — E78.00 PURE HYPERCHOLESTEROLEMIA, UNSPECIFIED: ICD-10-CM

## 2024-08-09 DIAGNOSIS — E11.65 TYPE 2 DIABETES MELLITUS WITH HYPERGLYCEMIA: ICD-10-CM

## 2024-08-11 PROBLEM — E78.00 ELEVATED LDL CHOLESTEROL LEVEL: Status: ACTIVE | Noted: 2024-08-11

## 2024-11-11 ENCOUNTER — APPOINTMENT (OUTPATIENT)
Dept: CARDIOLOGY | Facility: CLINIC | Age: 54
End: 2024-11-11
Payer: COMMERCIAL

## 2024-11-11 PROCEDURE — 93351 STRESS TTE COMPLETE: CPT

## 2024-11-11 PROCEDURE — 93325 DOPPLER ECHO COLOR FLOW MAPG: CPT

## 2024-11-11 PROCEDURE — 93320 DOPPLER ECHO COMPLETE: CPT

## 2024-11-21 ENCOUNTER — OUTPATIENT (OUTPATIENT)
Dept: OUTPATIENT SERVICES | Facility: HOSPITAL | Age: 54
LOS: 1 days | End: 2024-11-21
Payer: COMMERCIAL

## 2024-11-21 ENCOUNTER — APPOINTMENT (OUTPATIENT)
Dept: ENDOCRINOLOGY | Facility: CLINIC | Age: 54
End: 2024-11-21

## 2024-11-21 VITALS
SYSTOLIC BLOOD PRESSURE: 128 MMHG | WEIGHT: 163 LBS | BODY MASS INDEX: 28.87 KG/M2 | HEART RATE: 75 BPM | DIASTOLIC BLOOD PRESSURE: 85 MMHG

## 2024-11-21 DIAGNOSIS — Z00.00 ENCOUNTER FOR GENERAL ADULT MEDICAL EXAMINATION W/OUT ABNORMAL FINDINGS: ICD-10-CM

## 2024-11-21 DIAGNOSIS — E11.9 TYPE 2 DIABETES MELLITUS WITHOUT COMPLICATIONS: ICD-10-CM

## 2024-11-21 DIAGNOSIS — N52.01 ERECTILE DYSFUNCTION DUE TO ARTERIAL INSUFFICIENCY: ICD-10-CM

## 2024-11-21 DIAGNOSIS — Z00.00 ENCOUNTER FOR GENERAL ADULT MEDICAL EXAMINATION WITHOUT ABNORMAL FINDINGS: ICD-10-CM

## 2024-11-21 LAB
ALBUMIN SERPL ELPH-MCNC: 4.5 G/DL
ALP BLD-CCNC: 70 U/L
ALT SERPL-CCNC: 16 U/L
ANION GAP SERPL CALC-SCNC: 12 MMOL/L
AST SERPL-CCNC: 16 U/L
BASOPHILS # BLD AUTO: 0.07 K/UL
BASOPHILS NFR BLD AUTO: 0.8 %
BILIRUB SERPL-MCNC: 0.7 MG/DL
BUN SERPL-MCNC: 13 MG/DL
CALCIUM SERPL-MCNC: 9.5 MG/DL
CHLORIDE SERPL-SCNC: 100 MMOL/L
CHOLEST SERPL-MCNC: 126 MG/DL
CO2 SERPL-SCNC: 28 MMOL/L
CREAT SERPL-MCNC: 0.9 MG/DL
EGFR: 101 ML/MIN/1.73M2
EOSINOPHIL # BLD AUTO: 0.17 K/UL
EOSINOPHIL NFR BLD AUTO: 1.8 %
ESTIMATED AVERAGE GLUCOSE: 183 MG/DL
GLUCOSE SERPL-MCNC: 153 MG/DL
HBA1C MFR BLD HPLC: 8 %
HCT VFR BLD CALC: 52.7 %
HDLC SERPL-MCNC: 37 MG/DL
HGB BLD-MCNC: 17.8 G/DL
IMM GRANULOCYTES NFR BLD AUTO: 0.2 %
LDLC SERPL CALC-MCNC: 64 MG/DL
LYMPHOCYTES # BLD AUTO: 3.09 K/UL
LYMPHOCYTES NFR BLD AUTO: 33.4 %
MAN DIFF?: NORMAL
MCHC RBC-ENTMCNC: 28.7 PG
MCHC RBC-ENTMCNC: 33.8 G/DL
MCV RBC AUTO: 85 FL
MONOCYTES # BLD AUTO: 0.67 K/UL
MONOCYTES NFR BLD AUTO: 7.2 %
NEUTROPHILS # BLD AUTO: 5.23 K/UL
NEUTROPHILS NFR BLD AUTO: 56.6 %
NONHDLC SERPL-MCNC: 89 MG/DL
PLATELET # BLD AUTO: 206 K/UL
PMV BLD AUTO: 0 /100 WBCS
POTASSIUM SERPL-SCNC: 4.5 MMOL/L
PROT SERPL-MCNC: 6.7 G/DL
RBC # BLD: 6.2 M/UL
RBC # FLD: 14.9 %
SODIUM SERPL-SCNC: 140 MMOL/L
TRIGL SERPL-MCNC: 127 MG/DL
TSH SERPL-ACNC: 1.16 UIU/ML
WBC # FLD AUTO: 9.25 K/UL

## 2024-11-21 PROCEDURE — 85027 COMPLETE CBC AUTOMATED: CPT

## 2024-11-21 PROCEDURE — 83036 HEMOGLOBIN GLYCOSYLATED A1C: CPT

## 2024-11-21 PROCEDURE — 82043 UR ALBUMIN QUANTITATIVE: CPT

## 2024-11-21 PROCEDURE — 99214 OFFICE O/P EST MOD 30 MIN: CPT

## 2024-11-21 PROCEDURE — 84443 ASSAY THYROID STIM HORMONE: CPT

## 2024-11-21 PROCEDURE — 80053 COMPREHEN METABOLIC PANEL: CPT

## 2024-11-21 PROCEDURE — 80061 LIPID PANEL: CPT

## 2024-11-21 RX ORDER — SILDENAFIL 100 MG/1
100 TABLET, FILM COATED ORAL
Qty: 20 | Refills: 5 | Status: ACTIVE | COMMUNITY
Start: 2024-11-21 | End: 1900-01-01

## 2024-11-22 ENCOUNTER — APPOINTMENT (OUTPATIENT)
Dept: CARDIOLOGY | Facility: CLINIC | Age: 54
End: 2024-11-22
Payer: COMMERCIAL

## 2024-11-22 VITALS
SYSTOLIC BLOOD PRESSURE: 102 MMHG | HEART RATE: 85 BPM | HEIGHT: 63 IN | WEIGHT: 164 LBS | BODY MASS INDEX: 29.06 KG/M2 | DIASTOLIC BLOOD PRESSURE: 70 MMHG

## 2024-11-22 DIAGNOSIS — I70.213 ATHEROSCLEROSIS OF NATIVE ARTERIES OF EXTREMITIES WITH INTERMITTENT CLAUDICATION, BILATERAL LEGS: ICD-10-CM

## 2024-11-22 DIAGNOSIS — E78.5 HYPERLIPIDEMIA, UNSPECIFIED: ICD-10-CM

## 2024-11-22 DIAGNOSIS — E11.9 TYPE 2 DIABETES MELLITUS WITHOUT COMPLICATIONS: ICD-10-CM

## 2024-11-22 DIAGNOSIS — E11.9 TYPE 2 DIABETES MELLITUS W/OUT COMPLICATIONS: ICD-10-CM

## 2024-11-22 DIAGNOSIS — I25.10 ATHEROSCLEROTIC HEART DISEASE OF NATIVE CORONARY ARTERY W/OUT ANGINA PECTORIS: ICD-10-CM

## 2024-11-22 DIAGNOSIS — I10 ESSENTIAL (PRIMARY) HYPERTENSION: ICD-10-CM

## 2024-11-22 LAB
CREAT SPEC-SCNC: 116 MG/DL
MICROALBUMIN 24H UR DL<=1MG/L-MCNC: 17.6 MG/DL
MICROALBUMIN/CREAT 24H UR-RTO: 152 MG/G

## 2024-11-22 PROCEDURE — 93000 ELECTROCARDIOGRAM COMPLETE: CPT

## 2024-11-22 PROCEDURE — 99214 OFFICE O/P EST MOD 30 MIN: CPT | Mod: 25

## 2024-11-25 DIAGNOSIS — E11.65 TYPE 2 DIABETES MELLITUS WITH HYPERGLYCEMIA: ICD-10-CM

## 2024-11-25 DIAGNOSIS — E78.00 PURE HYPERCHOLESTEROLEMIA, UNSPECIFIED: ICD-10-CM

## 2024-11-30 ENCOUNTER — NON-APPOINTMENT (OUTPATIENT)
Age: 54
End: 2024-11-30

## 2025-01-09 ENCOUNTER — APPOINTMENT (OUTPATIENT)
Dept: PODIATRY | Facility: CLINIC | Age: 55
End: 2025-01-09
Payer: MEDICAID

## 2025-01-09 ENCOUNTER — OUTPATIENT (OUTPATIENT)
Dept: OUTPATIENT SERVICES | Facility: HOSPITAL | Age: 55
LOS: 1 days | End: 2025-01-09
Payer: MEDICAID

## 2025-01-09 DIAGNOSIS — Z00.00 ENCOUNTER FOR GENERAL ADULT MEDICAL EXAMINATION WITHOUT ABNORMAL FINDINGS: ICD-10-CM

## 2025-01-09 DIAGNOSIS — E11.42 TYPE 2 DIABETES MELLITUS WITH DIABETIC POLYNEUROPATHY: ICD-10-CM

## 2025-01-09 DIAGNOSIS — L85.3 XEROSIS CUTIS: ICD-10-CM

## 2025-01-09 PROCEDURE — 99213 OFFICE O/P EST LOW 20 MIN: CPT
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
## 2025-01-16 DIAGNOSIS — E11.42 TYPE 2 DIABETES MELLITUS WITH DIABETIC POLYNEUROPATHY: ICD-10-CM

## 2025-02-24 ENCOUNTER — RX RENEWAL (OUTPATIENT)
Age: 55
End: 2025-02-24

## 2025-03-03 ENCOUNTER — APPOINTMENT (OUTPATIENT)
Dept: VASCULAR SURGERY | Facility: CLINIC | Age: 55
End: 2025-03-03
Payer: COMMERCIAL

## 2025-03-03 VITALS — BODY MASS INDEX: 29.3 KG/M2 | WEIGHT: 165.35 LBS | HEIGHT: 63 IN

## 2025-03-03 VITALS — SYSTOLIC BLOOD PRESSURE: 108 MMHG | DIASTOLIC BLOOD PRESSURE: 71 MMHG

## 2025-03-03 DIAGNOSIS — I70.213 ATHEROSCLEROSIS OF NATIVE ARTERIES OF EXTREMITIES WITH INTERMITTENT CLAUDICATION, BILATERAL LEGS: ICD-10-CM

## 2025-03-03 PROCEDURE — 99212 OFFICE O/P EST SF 10 MIN: CPT

## 2025-03-03 PROCEDURE — 93925 LOWER EXTREMITY STUDY: CPT

## 2025-03-24 ENCOUNTER — RX RENEWAL (OUTPATIENT)
Age: 55
End: 2025-03-24

## 2025-03-29 ENCOUNTER — RX RENEWAL (OUTPATIENT)
Age: 55
End: 2025-03-29

## 2025-05-21 ENCOUNTER — APPOINTMENT (OUTPATIENT)
Dept: ENDOCRINOLOGY | Facility: CLINIC | Age: 55
End: 2025-05-21

## 2025-05-22 LAB
ALBUMIN SERPL ELPH-MCNC: 4.7 G/DL
ALP BLD-CCNC: 55 U/L
ALT SERPL-CCNC: 15 U/L
ANION GAP SERPL CALC-SCNC: 15 MMOL/L
AST SERPL-CCNC: 17 U/L
BILIRUB SERPL-MCNC: 0.6 MG/DL
BUN SERPL-MCNC: 18 MG/DL
CALCIUM SERPL-MCNC: 9.9 MG/DL
CHLORIDE SERPL-SCNC: 100 MMOL/L
CHOLEST SERPL-MCNC: 113 MG/DL
CK SERPL-CCNC: 62 U/L
CO2 SERPL-SCNC: 24 MMOL/L
CREAT SERPL-MCNC: 1 MG/DL
EGFRCR SERPLBLD CKD-EPI 2021: 89 ML/MIN/1.73M2
ESTIMATED AVERAGE GLUCOSE: 137 MG/DL
GLUCOSE SERPL-MCNC: 135 MG/DL
HBA1C MFR BLD HPLC: 6.4 %
HCT VFR BLD CALC: 47.8 %
HDLC SERPL-MCNC: 36 MG/DL
HGB BLD-MCNC: 16.7 G/DL
LDLC SERPL-MCNC: 56 MG/DL
MCHC RBC-ENTMCNC: 29.7 PG
MCHC RBC-ENTMCNC: 34.9 G/DL
MCV RBC AUTO: 85.1 FL
NONHDLC SERPL-MCNC: 77 MG/DL
PLATELET # BLD AUTO: 199 K/UL
PMV BLD AUTO: 0 /100 WBCS
PMV BLD: 10.9 FL
POTASSIUM SERPL-SCNC: 4.2 MMOL/L
PROT SERPL-MCNC: 6.6 G/DL
RBC # BLD: 5.62 M/UL
RBC # FLD: 14.8 %
SODIUM SERPL-SCNC: 139 MMOL/L
TRIGL SERPL-MCNC: 111 MG/DL
TSH SERPL-ACNC: 1.09 UIU/ML
WBC # FLD AUTO: 8.63 K/UL

## 2025-05-27 ENCOUNTER — APPOINTMENT (OUTPATIENT)
Dept: CARDIOLOGY | Facility: CLINIC | Age: 55
End: 2025-05-27
Payer: COMMERCIAL

## 2025-05-27 VITALS
HEART RATE: 73 BPM | WEIGHT: 157 LBS | BODY MASS INDEX: 27.82 KG/M2 | HEIGHT: 63 IN | DIASTOLIC BLOOD PRESSURE: 70 MMHG | SYSTOLIC BLOOD PRESSURE: 100 MMHG

## 2025-05-27 DIAGNOSIS — I25.10 ATHEROSCLEROTIC HEART DISEASE OF NATIVE CORONARY ARTERY W/OUT ANGINA PECTORIS: ICD-10-CM

## 2025-05-27 DIAGNOSIS — E78.5 HYPERLIPIDEMIA, UNSPECIFIED: ICD-10-CM

## 2025-05-27 DIAGNOSIS — E11.9 TYPE 2 DIABETES MELLITUS W/OUT COMPLICATIONS: ICD-10-CM

## 2025-05-27 DIAGNOSIS — I70.213 ATHEROSCLEROSIS OF NATIVE ARTERIES OF EXTREMITIES WITH INTERMITTENT CLAUDICATION, BILATERAL LEGS: ICD-10-CM

## 2025-05-27 DIAGNOSIS — I10 ESSENTIAL (PRIMARY) HYPERTENSION: ICD-10-CM

## 2025-05-27 PROCEDURE — 93000 ELECTROCARDIOGRAM COMPLETE: CPT

## 2025-05-27 PROCEDURE — 99214 OFFICE O/P EST MOD 30 MIN: CPT | Mod: 25

## 2025-06-11 ENCOUNTER — APPOINTMENT (OUTPATIENT)
Dept: ENDOCRINOLOGY | Facility: CLINIC | Age: 55
End: 2025-06-11

## 2025-06-11 ENCOUNTER — OUTPATIENT (OUTPATIENT)
Dept: OUTPATIENT SERVICES | Facility: HOSPITAL | Age: 55
LOS: 1 days | End: 2025-06-11
Payer: COMMERCIAL

## 2025-06-11 VITALS
SYSTOLIC BLOOD PRESSURE: 102 MMHG | DIASTOLIC BLOOD PRESSURE: 68 MMHG | HEIGHT: 63 IN | HEART RATE: 80 BPM | BODY MASS INDEX: 28.35 KG/M2 | WEIGHT: 160 LBS

## 2025-06-11 DIAGNOSIS — Z00.00 ENCOUNTER FOR GENERAL ADULT MEDICAL EXAMINATION WITHOUT ABNORMAL FINDINGS: ICD-10-CM

## 2025-06-11 PROCEDURE — 99214 OFFICE O/P EST MOD 30 MIN: CPT

## 2025-06-17 DIAGNOSIS — E78.00 PURE HYPERCHOLESTEROLEMIA, UNSPECIFIED: ICD-10-CM

## 2025-06-17 DIAGNOSIS — E11.65 TYPE 2 DIABETES MELLITUS WITH HYPERGLYCEMIA: ICD-10-CM

## 2025-07-30 ENCOUNTER — RX RENEWAL (OUTPATIENT)
Age: 55
End: 2025-07-30

## 2025-08-09 ENCOUNTER — RX RENEWAL (OUTPATIENT)
Age: 55
End: 2025-08-09

## 2025-08-27 ENCOUNTER — RX RENEWAL (OUTPATIENT)
Age: 55
End: 2025-08-27

## 2025-09-05 ENCOUNTER — RX RENEWAL (OUTPATIENT)
Age: 55
End: 2025-09-05